# Patient Record
Sex: FEMALE | Race: WHITE | NOT HISPANIC OR LATINO
[De-identification: names, ages, dates, MRNs, and addresses within clinical notes are randomized per-mention and may not be internally consistent; named-entity substitution may affect disease eponyms.]

---

## 2017-02-10 ENCOUNTER — APPOINTMENT (OUTPATIENT)
Dept: ELECTROPHYSIOLOGY | Facility: CLINIC | Age: 82
End: 2017-02-10

## 2017-03-02 ENCOUNTER — APPOINTMENT (OUTPATIENT)
Dept: ELECTROPHYSIOLOGY | Facility: CLINIC | Age: 82
End: 2017-03-02

## 2017-03-02 ENCOUNTER — NON-APPOINTMENT (OUTPATIENT)
Age: 82
End: 2017-03-02

## 2017-03-02 VITALS — OXYGEN SATURATION: 98 % | HEART RATE: 71 BPM | SYSTOLIC BLOOD PRESSURE: 134 MMHG | DIASTOLIC BLOOD PRESSURE: 71 MMHG

## 2017-09-07 ENCOUNTER — APPOINTMENT (OUTPATIENT)
Dept: ELECTROPHYSIOLOGY | Facility: CLINIC | Age: 82
End: 2017-09-07

## 2018-05-29 ENCOUNTER — LABORATORY RESULT (OUTPATIENT)
Age: 83
End: 2018-05-29

## 2018-05-29 ENCOUNTER — MOBILE ON CALL (OUTPATIENT)
Age: 83
End: 2018-05-29

## 2018-05-29 ENCOUNTER — APPOINTMENT (OUTPATIENT)
Dept: DERMATOLOGY | Facility: CLINIC | Age: 83
End: 2018-05-29

## 2018-05-29 ENCOUNTER — APPOINTMENT (OUTPATIENT)
Dept: DERMATOLOGY | Facility: CLINIC | Age: 83
End: 2018-05-29
Payer: MEDICARE

## 2018-05-29 VITALS — DIASTOLIC BLOOD PRESSURE: 60 MMHG | BODY MASS INDEX: 29.7 KG/M2 | SYSTOLIC BLOOD PRESSURE: 90 MMHG | WEIGHT: 184 LBS

## 2018-05-29 DIAGNOSIS — Z86.69 PERSONAL HISTORY OF OTHER DISEASES OF THE NERVOUS SYSTEM AND SENSE ORGANS: ICD-10-CM

## 2018-05-29 DIAGNOSIS — H91.93 UNSPECIFIED HEARING LOSS, BILATERAL: ICD-10-CM

## 2018-05-29 DIAGNOSIS — Z86.79 PERSONAL HISTORY OF OTHER DISEASES OF THE CIRCULATORY SYSTEM: ICD-10-CM

## 2018-05-29 DIAGNOSIS — D49.89 NEOPLASM OF UNSPECIFIED BEHAVIOR OF OTHER SPECIFIED SITES: ICD-10-CM

## 2018-05-29 DIAGNOSIS — Z91.89 OTHER SPECIFIED PERSONAL RISK FACTORS, NOT ELSEWHERE CLASSIFIED: ICD-10-CM

## 2018-05-29 DIAGNOSIS — Z87.39 PERSONAL HISTORY OF OTHER DISEASES OF THE MUSCULOSKELETAL SYSTEM AND CONNECTIVE TISSUE: ICD-10-CM

## 2018-05-29 DIAGNOSIS — H54.7 UNSPECIFIED VISUAL LOSS: ICD-10-CM

## 2018-05-29 PROCEDURE — 11100 BX SKIN SUBCUTANEOUS&/MUCOUS MEMBRANE 1 LESION: CPT

## 2018-05-29 PROCEDURE — 99203 OFFICE O/P NEW LOW 30 MIN: CPT | Mod: 25

## 2018-05-29 PROCEDURE — 11101 BIOPSY SKIN SUBQ&/MUCOUS MEMBRANE EA ADDL LESN: CPT

## 2018-05-29 RX ORDER — OSTOMY SUPPLY 2 3/4"
EACH MISCELLANEOUS
Qty: 3 | Refills: 0 | Status: ACTIVE | COMMUNITY
Start: 2018-05-29 | End: 1900-01-01

## 2018-05-31 ENCOUNTER — TRANSCRIPTION ENCOUNTER (OUTPATIENT)
Age: 83
End: 2018-05-31

## 2018-06-05 PROBLEM — D49.89 NEOPLASM OF ARM: Status: ACTIVE | Noted: 2018-06-05

## 2018-06-22 ENCOUNTER — APPOINTMENT (OUTPATIENT)
Dept: GERIATRICS | Facility: CLINIC | Age: 83
End: 2018-06-22
Payer: MEDICARE

## 2018-06-22 VITALS
OXYGEN SATURATION: 98 % | DIASTOLIC BLOOD PRESSURE: 52 MMHG | WEIGHT: 187.2 LBS | SYSTOLIC BLOOD PRESSURE: 140 MMHG | TEMPERATURE: 98.4 F | BODY MASS INDEX: 30.22 KG/M2 | HEART RATE: 82 BPM

## 2018-06-22 DIAGNOSIS — R55 SYNCOPE AND COLLAPSE: ICD-10-CM

## 2018-06-22 DIAGNOSIS — Z86.39 PERSONAL HISTORY OF OTHER ENDOCRINE, NUTRITIONAL AND METABOLIC DISEASE: ICD-10-CM

## 2018-06-22 PROCEDURE — 99205 OFFICE O/P NEW HI 60 MIN: CPT

## 2018-06-24 ENCOUNTER — EMERGENCY (EMERGENCY)
Facility: HOSPITAL | Age: 83
LOS: 1 days | Discharge: HOME | End: 2018-06-24
Attending: EMERGENCY MEDICINE
Payer: MEDICARE

## 2018-06-24 VITALS
DIASTOLIC BLOOD PRESSURE: 62 MMHG | HEART RATE: 64 BPM | SYSTOLIC BLOOD PRESSURE: 152 MMHG | TEMPERATURE: 98 F | OXYGEN SATURATION: 98 % | RESPIRATION RATE: 16 BRPM

## 2018-06-24 VITALS
TEMPERATURE: 98 F | RESPIRATION RATE: 18 BRPM | DIASTOLIC BLOOD PRESSURE: 71 MMHG | OXYGEN SATURATION: 97 % | HEART RATE: 77 BPM | SYSTOLIC BLOOD PRESSURE: 135 MMHG

## 2018-06-24 PROCEDURE — 99284 EMERGENCY DEPT VISIT MOD MDM: CPT

## 2018-06-24 PROCEDURE — 70450 CT HEAD/BRAIN W/O DYE: CPT

## 2018-06-24 PROCEDURE — 73130 X-RAY EXAM OF HAND: CPT | Mod: 26,RT

## 2018-06-24 PROCEDURE — 72125 CT NECK SPINE W/O DYE: CPT

## 2018-06-24 PROCEDURE — 70450 CT HEAD/BRAIN W/O DYE: CPT | Mod: 26

## 2018-06-24 PROCEDURE — 72125 CT NECK SPINE W/O DYE: CPT | Mod: 26

## 2018-06-24 PROCEDURE — 99284 EMERGENCY DEPT VISIT MOD MDM: CPT | Mod: 25

## 2018-06-24 PROCEDURE — 90471 IMMUNIZATION ADMIN: CPT

## 2018-06-24 PROCEDURE — 90715 TDAP VACCINE 7 YRS/> IM: CPT

## 2018-06-24 PROCEDURE — 73130 X-RAY EXAM OF HAND: CPT

## 2018-06-24 RX ORDER — ACETAMINOPHEN 500 MG
650 TABLET ORAL ONCE
Qty: 0 | Refills: 0 | Status: COMPLETED | OUTPATIENT
Start: 2018-06-24 | End: 2018-06-24

## 2018-06-24 RX ORDER — TETANUS TOXOID, REDUCED DIPHTHERIA TOXOID AND ACELLULAR PERTUSSIS VACCINE, ADSORBED 5; 2.5; 8; 8; 2.5 [IU]/.5ML; [IU]/.5ML; UG/.5ML; UG/.5ML; UG/.5ML
0.5 SUSPENSION INTRAMUSCULAR ONCE
Qty: 0 | Refills: 0 | Status: COMPLETED | OUTPATIENT
Start: 2018-06-24 | End: 2018-06-24

## 2018-06-24 RX ADMIN — TETANUS TOXOID, REDUCED DIPHTHERIA TOXOID AND ACELLULAR PERTUSSIS VACCINE, ADSORBED 0.5 MILLILITER(S): 5; 2.5; 8; 8; 2.5 SUSPENSION INTRAMUSCULAR at 10:45

## 2018-06-24 NOTE — ED ADULT NURSE NOTE - OBJECTIVE STATEMENT
pt 89 yo female via ems for eval s/p fall sometime last night per aide was reported to aide this morning by night aide pt declined going to er last night per protocol EMS called this am pt did not want to come due to dementia hx ems transported pt for eval aide states son is aware pt at baseline mental status oriented x 2 pt with small abrasion right lateral right eye no bleeding skin tear to right hand and left shin no active bleeding pt denies any pain pending md evaluation

## 2018-06-24 NOTE — ED PROVIDER NOTE - MEDICAL DECISION MAKING DETAILS
89F presenting with r hand and r forehead injury s/p fall.  tetanus vaccination, tylenol, R hand xray and CT head/c-spine.

## 2018-06-24 NOTE — ED PROVIDER NOTE - MUSCULOSKELETAL, MLM
Spine appears normal, range of motion is not limited, no muscle or joint tenderness. Able to fully range both hips/knees/ankles.  Fully range right arm/shoulder/elbow/hand/fingers.  No joint tenderness.

## 2018-06-24 NOTE — ED ADULT NURSE REASSESSMENT NOTE - NS ED NURSE REASSESS COMMENT FT1
pt with comfort care given diaper changed with adhesive pad applied to redness on sacral area pt pending radiology results for discharge aide at bedside pt with no complaints

## 2018-06-24 NOTE — ED PROVIDER NOTE - PMH
Afib    Benign Essential Hypertension    CAD (Coronary Artery Disease)    Dyslipidemia    Pacemaker    Syncope

## 2018-06-24 NOTE — ED ADULT NURSE REASSESSMENT NOTE - NS ED NURSE REASSESS COMMENT FT1
pt given teatanus with info sheet to aide at bedside pt declined tylenol states no pain Dr Boykin notidifed right hand skin tear cleaned bacitracin with adaptic dressing and cling applied pt pending radiology results remains alert verbal no complaints

## 2018-06-24 NOTE — ED PROVIDER NOTE - OBJECTIVE STATEMENT
89y/o F with h/o ?dementia, CAD, HLD, PPM, Afib no AC, presenting s/p mechanical fall last night; per aide, patient was transferring to commode last night (unclear what time, before midnight), and fell, hitting dorsum of right hand and left front of head.  No reported LOC.  No n/v.  No change in behavior.  This morning, aide noted bleeding from dorsum of right hand, skin tear, and brought her to ED for evaluation.  Patient has no complaints, does not know how she fell, did not know hand was injured, and denies head injury.  At baseline mental status per aide.  No fever/chills.  No n/v.  No abd pain.  No diarrhea.  No change in urination.  No rash.

## 2018-06-24 NOTE — ED PROVIDER NOTE - ENMT, MLM
Airway patent, Nasal mucosa clear. Mouth with normal mucosa. Throat has no vesicles, no oropharyngeal exudates and uvula is midline.  Nrl TM b/l.  No newman's sign.  No periporbital ecchymoses.

## 2018-06-24 NOTE — ED PROVIDER NOTE - PROGRESS NOTE DETAILS
Spoke with radiology resident; CT head and c spine negative for acute injury; however, reports not crossing over, IT problem, will not be fixed until tomorrow, verbal read only for these studies at present.  Will dc patient. Benigno

## 2018-06-24 NOTE — ED PROVIDER NOTE - PLAN OF CARE
You were seen in the Emergency Department for a head injury and injury to the right hand.  A CT-scan of your head showed no fracture or internal bleeding.  An x-ray of your hand showed no acute fractures. You received a tetanus vaccination.  Your right hand skin tear was cleaned and bandaged; keep the wound clean/dry, change bandage twice daily for at least 1 week.      Return immediately with any worsening pain, severe headache, vomiting, fever, redness around hand, pus draining from hand, or other new/concerning symptoms.

## 2018-06-24 NOTE — ED PROVIDER NOTE - PHYSICAL EXAMINATION
skin:   Forehead: 1cm circular abrasion/ecchymoses over right forehead, no active bleeding, no palpable hematoma or skull defect.  R hand: 2cm skin tear with underlying ecchymoses, no active bleeding.      No cervical/thoracic/lumbar spinal tenderness or step off

## 2018-06-24 NOTE — ED PROVIDER NOTE - CARE PLAN
Principal Discharge DX:	Injury of head, initial encounter  Assessment and plan of treatment:	You were seen in the Emergency Department for a head injury and injury to the right hand.  A CT-scan of your head showed no fracture or internal bleeding.  An x-ray of your hand showed no acute fractures. You received a tetanus vaccination.  Your right hand skin tear was cleaned and bandaged; keep the wound clean/dry, change bandage twice daily for at least 1 week.      Return immediately with any worsening pain, severe headache, vomiting, fever, redness around hand, pus draining from hand, or other new/concerning symptoms.  Secondary Diagnosis:	Skin tear of hand without complication, right, initial encounter

## 2018-06-25 LAB
ALBUMIN SERPL ELPH-MCNC: 4 G/DL
ALP BLD-CCNC: 78 U/L
ALT SERPL-CCNC: 8 U/L
ANION GAP SERPL CALC-SCNC: 13 MMOL/L
AST SERPL-CCNC: 10 U/L
BASOPHILS # BLD AUTO: 0.02 K/UL
BASOPHILS NFR BLD AUTO: 0.2 %
BILIRUB SERPL-MCNC: 0.3 MG/DL
BUN SERPL-MCNC: 41 MG/DL
CALCIUM SERPL-MCNC: 9.2 MG/DL
CHLORIDE SERPL-SCNC: 101 MMOL/L
CHOLEST SERPL-MCNC: 165 MG/DL
CHOLEST/HDLC SERPL: 2.5 RATIO
CO2 SERPL-SCNC: 27 MMOL/L
CREAT SERPL-MCNC: 1.64 MG/DL
EOSINOPHIL # BLD AUTO: 0.12 K/UL
EOSINOPHIL NFR BLD AUTO: 1.2 %
GLUCOSE SERPL-MCNC: 134 MG/DL
HCT VFR BLD CALC: 39.4 %
HDLC SERPL-MCNC: 67 MG/DL
HGB BLD-MCNC: 12.8 G/DL
IMM GRANULOCYTES NFR BLD AUTO: 0.1 %
LDLC SERPL CALC-MCNC: 72 MG/DL
LYMPHOCYTES # BLD AUTO: 2.8 K/UL
LYMPHOCYTES NFR BLD AUTO: 27.9 %
MAN DIFF?: NORMAL
MCHC RBC-ENTMCNC: 30.2 PG
MCHC RBC-ENTMCNC: 32.5 GM/DL
MCV RBC AUTO: 92.9 FL
MONOCYTES # BLD AUTO: 0.75 K/UL
MONOCYTES NFR BLD AUTO: 7.5 %
NEUTROPHILS # BLD AUTO: 6.35 K/UL
NEUTROPHILS NFR BLD AUTO: 63.1 %
PLATELET # BLD AUTO: 267 K/UL
POTASSIUM SERPL-SCNC: 4.7 MMOL/L
PROT SERPL-MCNC: 7.1 G/DL
RBC # BLD: 4.24 M/UL
RBC # FLD: 13.6 %
SODIUM SERPL-SCNC: 141 MMOL/L
TRIGL SERPL-MCNC: 128 MG/DL
TSH SERPL-ACNC: 1.3 UIU/ML
WBC # FLD AUTO: 10.05 K/UL

## 2018-06-26 ENCOUNTER — APPOINTMENT (OUTPATIENT)
Dept: DERMATOLOGY | Facility: CLINIC | Age: 83
End: 2018-06-26
Payer: MEDICARE

## 2018-06-26 PROCEDURE — 17262 DSTRJ MAL LES T/A/L 1.1-2.0: CPT

## 2018-07-06 ENCOUNTER — APPOINTMENT (OUTPATIENT)
Dept: DERMATOLOGY | Facility: CLINIC | Age: 83
End: 2018-07-06
Payer: MEDICARE

## 2018-07-06 VITALS — DIASTOLIC BLOOD PRESSURE: 74 MMHG | SYSTOLIC BLOOD PRESSURE: 130 MMHG

## 2018-07-06 DIAGNOSIS — T14.8XXA OTHER INJURY OF UNSPECIFIED BODY REGION, INITIAL ENCOUNTER: ICD-10-CM

## 2018-07-06 PROCEDURE — 99213 OFFICE O/P EST LOW 20 MIN: CPT | Mod: 24

## 2018-07-06 RX ORDER — NYSTATIN 100000 U/G
100000 OINTMENT TOPICAL
Qty: 15 | Refills: 0 | Status: DISCONTINUED | COMMUNITY
Start: 2018-05-26 | End: 2018-07-06

## 2018-07-25 ENCOUNTER — INPATIENT (INPATIENT)
Facility: HOSPITAL | Age: 83
LOS: 3 days | Discharge: HOME | DRG: 444 | End: 2018-07-29
Attending: INTERNAL MEDICINE | Admitting: HOSPITALIST
Payer: MEDICARE

## 2018-07-25 VITALS
TEMPERATURE: 98 F | HEART RATE: 66 BPM | SYSTOLIC BLOOD PRESSURE: 100 MMHG | DIASTOLIC BLOOD PRESSURE: 60 MMHG | RESPIRATION RATE: 20 BRPM

## 2018-07-25 DIAGNOSIS — K85.10 BILIARY ACUTE PANCREATITIS WITHOUT NECROSIS OR INFECTION: ICD-10-CM

## 2018-07-25 LAB
ALBUMIN SERPL ELPH-MCNC: 3.5 G/DL — SIGNIFICANT CHANGE UP (ref 3.3–5)
ALP SERPL-CCNC: 180 U/L — HIGH (ref 40–120)
ALT FLD-CCNC: 325 U/L — HIGH (ref 10–45)
ANION GAP SERPL CALC-SCNC: 15 MMOL/L — SIGNIFICANT CHANGE UP (ref 5–17)
APPEARANCE UR: ABNORMAL
APTT BLD: 25.7 SEC — LOW (ref 27.5–37.4)
AST SERPL-CCNC: 283 U/L — HIGH (ref 10–40)
BACTERIA # UR AUTO: ABNORMAL /HPF
BASOPHILS # BLD AUTO: 0 K/UL — SIGNIFICANT CHANGE UP (ref 0–0.2)
BASOPHILS NFR BLD AUTO: 0.2 % — SIGNIFICANT CHANGE UP (ref 0–2)
BILIRUB SERPL-MCNC: 3.9 MG/DL — HIGH (ref 0.2–1.2)
BILIRUB UR-MCNC: NEGATIVE — SIGNIFICANT CHANGE UP
BUN SERPL-MCNC: 41 MG/DL — HIGH (ref 7–23)
CALCIUM SERPL-MCNC: 9 MG/DL — SIGNIFICANT CHANGE UP (ref 8.4–10.5)
CHLORIDE SERPL-SCNC: 100 MMOL/L — SIGNIFICANT CHANGE UP (ref 96–108)
CO2 SERPL-SCNC: 22 MMOL/L — SIGNIFICANT CHANGE UP (ref 22–31)
COLOR SPEC: YELLOW — SIGNIFICANT CHANGE UP
CREAT SERPL-MCNC: 1.9 MG/DL — HIGH (ref 0.5–1.3)
DIFF PNL FLD: ABNORMAL
EOSINOPHIL # BLD AUTO: 0 K/UL — SIGNIFICANT CHANGE UP (ref 0–0.5)
EOSINOPHIL NFR BLD AUTO: 0 % — SIGNIFICANT CHANGE UP (ref 0–6)
EPI CELLS # UR: SIGNIFICANT CHANGE UP /HPF
GAS PNL BLDV: SIGNIFICANT CHANGE UP
GLUCOSE SERPL-MCNC: 115 MG/DL — HIGH (ref 70–99)
GLUCOSE UR QL: NEGATIVE — SIGNIFICANT CHANGE UP
HCT VFR BLD CALC: 39.5 % — SIGNIFICANT CHANGE UP (ref 34.5–45)
HGB BLD-MCNC: 12.8 G/DL — SIGNIFICANT CHANGE UP (ref 11.5–15.5)
INR BLD: 1.18 RATIO — HIGH (ref 0.88–1.16)
KETONES UR-MCNC: NEGATIVE — SIGNIFICANT CHANGE UP
LEUKOCYTE ESTERASE UR-ACNC: ABNORMAL
LIDOCAIN IGE QN: 244 U/L — HIGH (ref 7–60)
LYMPHOCYTES # BLD AUTO: 16.7 % — SIGNIFICANT CHANGE UP (ref 13–44)
LYMPHOCYTES # BLD AUTO: 2.1 K/UL — SIGNIFICANT CHANGE UP (ref 1–3.3)
MCHC RBC-ENTMCNC: 29.5 PG — SIGNIFICANT CHANGE UP (ref 27–34)
MCHC RBC-ENTMCNC: 32.3 GM/DL — SIGNIFICANT CHANGE UP (ref 32–36)
MCV RBC AUTO: 91.3 FL — SIGNIFICANT CHANGE UP (ref 80–100)
MONOCYTES # BLD AUTO: 0.8 K/UL — SIGNIFICANT CHANGE UP (ref 0–0.9)
MONOCYTES NFR BLD AUTO: 6.5 % — SIGNIFICANT CHANGE UP (ref 2–14)
NEUTROPHILS # BLD AUTO: 9.5 K/UL — HIGH (ref 1.8–7.4)
NEUTROPHILS NFR BLD AUTO: 76.6 % — SIGNIFICANT CHANGE UP (ref 43–77)
NITRITE UR-MCNC: NEGATIVE — SIGNIFICANT CHANGE UP
PH UR: 6 — SIGNIFICANT CHANGE UP (ref 5–8)
PLATELET # BLD AUTO: 172 K/UL — SIGNIFICANT CHANGE UP (ref 150–400)
POTASSIUM SERPL-MCNC: 4.8 MMOL/L — SIGNIFICANT CHANGE UP (ref 3.5–5.3)
POTASSIUM SERPL-SCNC: 4.8 MMOL/L — SIGNIFICANT CHANGE UP (ref 3.5–5.3)
PROT SERPL-MCNC: 7.1 G/DL — SIGNIFICANT CHANGE UP (ref 6–8.3)
PROT UR-MCNC: 30 MG/DL
PROTHROM AB SERPL-ACNC: 12.8 SEC — HIGH (ref 9.8–12.7)
RBC # BLD: 4.33 M/UL — SIGNIFICANT CHANGE UP (ref 3.8–5.2)
RBC # FLD: 12.7 % — SIGNIFICANT CHANGE UP (ref 10.3–14.5)
RBC CASTS # UR COMP ASSIST: SIGNIFICANT CHANGE UP /HPF (ref 0–2)
SODIUM SERPL-SCNC: 137 MMOL/L — SIGNIFICANT CHANGE UP (ref 135–145)
SP GR SPEC: 1.01 — SIGNIFICANT CHANGE UP (ref 1.01–1.02)
UROBILINOGEN FLD QL: 1 MG/DL
WBC # BLD: 12.4 K/UL — HIGH (ref 3.8–10.5)
WBC # FLD AUTO: 12.4 K/UL — HIGH (ref 3.8–10.5)
WBC UR QL: SIGNIFICANT CHANGE UP /HPF (ref 0–5)

## 2018-07-25 PROCEDURE — 76700 US EXAM ABDOM COMPLETE: CPT | Mod: 26

## 2018-07-25 PROCEDURE — 74176 CT ABD & PELVIS W/O CONTRAST: CPT | Mod: 26

## 2018-07-25 PROCEDURE — 93010 ELECTROCARDIOGRAM REPORT: CPT

## 2018-07-25 PROCEDURE — 71045 X-RAY EXAM CHEST 1 VIEW: CPT | Mod: 26

## 2018-07-25 PROCEDURE — 76705 ECHO EXAM OF ABDOMEN: CPT | Mod: 26

## 2018-07-25 PROCEDURE — 99285 EMERGENCY DEPT VISIT HI MDM: CPT | Mod: 25

## 2018-07-25 RX ORDER — SODIUM CHLORIDE 9 MG/ML
1000 INJECTION INTRAMUSCULAR; INTRAVENOUS; SUBCUTANEOUS ONCE
Qty: 0 | Refills: 0 | Status: COMPLETED | OUTPATIENT
Start: 2018-07-25 | End: 2018-07-25

## 2018-07-25 RX ORDER — CEFTRIAXONE 500 MG/1
1 INJECTION, POWDER, FOR SOLUTION INTRAMUSCULAR; INTRAVENOUS ONCE
Qty: 0 | Refills: 0 | Status: COMPLETED | OUTPATIENT
Start: 2018-07-25 | End: 2018-07-25

## 2018-07-25 RX ORDER — PIPERACILLIN AND TAZOBACTAM 4; .5 G/20ML; G/20ML
3.38 INJECTION, POWDER, LYOPHILIZED, FOR SOLUTION INTRAVENOUS ONCE
Qty: 0 | Refills: 0 | Status: COMPLETED | OUTPATIENT
Start: 2018-07-25 | End: 2018-07-25

## 2018-07-25 RX ADMIN — CEFTRIAXONE 100 GRAM(S): 500 INJECTION, POWDER, FOR SOLUTION INTRAMUSCULAR; INTRAVENOUS at 16:41

## 2018-07-25 RX ADMIN — SODIUM CHLORIDE 1000 MILLILITER(S): 9 INJECTION INTRAMUSCULAR; INTRAVENOUS; SUBCUTANEOUS at 22:15

## 2018-07-25 RX ADMIN — CEFTRIAXONE 1 GRAM(S): 500 INJECTION, POWDER, FOR SOLUTION INTRAMUSCULAR; INTRAVENOUS at 17:10

## 2018-07-25 RX ADMIN — SODIUM CHLORIDE 1000 MILLILITER(S): 9 INJECTION INTRAMUSCULAR; INTRAVENOUS; SUBCUTANEOUS at 21:16

## 2018-07-25 RX ADMIN — PIPERACILLIN AND TAZOBACTAM 3.38 GRAM(S): 4; .5 INJECTION, POWDER, LYOPHILIZED, FOR SOLUTION INTRAVENOUS at 21:50

## 2018-07-25 RX ADMIN — PIPERACILLIN AND TAZOBACTAM 200 GRAM(S): 4; .5 INJECTION, POWDER, LYOPHILIZED, FOR SOLUTION INTRAVENOUS at 21:16

## 2018-07-25 NOTE — CONSULT NOTE ADULT - ASSESSMENT
89yo F with suspected acute cholecystitis, found to also have pancreatitis (elevated lipase). Etiology likely gallstones given concurrent biliary presentation  - Aggressive IV hydration for pancreatitis  - Recommend starting antibiotics for suspected cholangitis and for UTI  - Recheck lipase and LFTs in the AM  - Following resolution of pancreatitis, kaylen not offer elective cholecystectomy to this 89F who is likely a poor surgical candidate  - Should her presentation become more concerning for acute cholecystectomy, would obtain HIDA to confirm and get IR consult for roma laxmi  - D/w Dr. Balbuena    Red  [id number]

## 2018-07-25 NOTE — ED PROVIDER NOTE - MEDICAL DECISION MAKING DETAILS
89 year old female with emesis and confusion. Concern for infection etiology. Will obtain UA, chest x-ray and reassess

## 2018-07-25 NOTE — ED ADULT NURSE NOTE - OBJECTIVE STATEMENT
89 y/o F, A&Ox2 - to person and place (at baseline - hx. of dementia), enters ED by EMS w/ aid for c/o tremors and dark, cloudy urine. As per aid, pt. started have tremors yesterday. Aid also reports "orange-looking" urine associated w/ a foul odor. Aid also reports pt. has not been speaking as much as usual and increased weakness/lethargy. As per son who came later to bedside, pt. had multiple episodes of vomiting yesterday. Abdomen soft, round, nontender. No falls. No fever/chills. Ecchymosis noted to bilateral upper & lower extremities. Persistent redness noted to the sacrum and multiple stage 2 pressure ulcers. 89 y/o F, A&Ox2 - to person and place (at baseline - hx. of dementia), enters ED by EMS w/ aid for c/o tremors and dark, cloudy urine. As per aid, pt. started have tremors yesterday. Aid also reports "orange-looking" urine associated w/ a foul odor. Aid also reports pt. has not been speaking as much as usual and increased weakness/lethargy. As per son who came later to bedside, pt. had multiple episodes of vomiting yesterday. Abdomen soft, round, nontender. No falls. No fever/chills. Ecchymosis noted to bilateral upper & lower extremities. Persistent redness noted to the sacrum and multiple stage 2 pressure ulcers. Side rails up for safety. Yellow band and red socks applied. Family and aid at bedside. 89 y/o F, A&Ox2 - to person and place (at baseline - hx. of dementia), enters ED by EMS w/ aid for c/o tremors and dark, cloudy urine. Hx. of a-fib, pt. has a pacemaker. As per aid, pt. started have tremors yesterday. Aid also reports "orange-looking" urine associated w/ a foul odor. Son reports that pt. was dx. w/ a UTI 3 months and was treated until it resolved about a month ago. Pt. is typically able to ambulate w/ assistance, however, aid reports pt. was too weak to walk due to tremors. Aid also reports pt. has not been speaking as much as usual and increased weakness/lethargy. As per son who came later to bedside, pt. had multiple episodes of vomiting yesterday. Abdomen soft, round, nontender. As per aid, pt. has been having normal BMs. No falls. No fever/chills. Ecchymosis noted to bilateral upper & lower extremities. Persistent redness noted to the sacrum and multiple stage 2 pressure ulcers. Side rails up for safety. Yellow band and red socks applied. Family and aid at bedside.

## 2018-07-25 NOTE — ED PROVIDER NOTE - PROGRESS NOTE DETAILS
patient found to have elevated total bilirubin, LFTs and lipase, concern for gallstone pancreatitis at this time; ct a/p odered. -Marylu Waters PA-C Jame Escamilla MD FACEP   right upper quadrant performed and possible signs of cholecystitis   labs and imaging relayed to patient and family that patient has left adnexal cyst, dilation of the gallbladder, and L4 vertebral fracture all discussed with patient/aide/son, they understood the findings with with capacity and insight.    Sx consulted secondary to findings on ultrasound. Dr. Deluna aware of patient if surgery does not recommend as a surgical candidate.

## 2018-07-25 NOTE — CONSULT NOTE ADULT - SUBJECTIVE AND OBJECTIVE BOX
89yo F with hx afib (not on A/C), HTN, CAD, HLD, dementia, now presents with complaint of nausea/vomiting x 1 day a/w tremors and weakness. Her urine has also had a cloudy appearance and was foul-smelling, as per her aide. Of note, she had a UTI 3 months ago which was treated with antibiotics. She has never had a diagnosis of pancreatitis before and has never been told that she has gallstones/sludge.    PMH  Pacemaker  Syncope  Afib  CAD (Coronary Artery Disease)  Dyslipidemia  Benign Essential Hypertension    PSH  H/O dilation and curettage    Allergies  No Known Allergies    Physical Exam  T(C): 37.1 (18 @ 19:30), Max: 37.1 (18 @ 19:30)  HR: 63 (18 @ 19:30) (63 - 71)  BP: 147/57 (18 @ 19:30) (100/60 - 160/63)  RR: 20 (18 @ 19:30) (18 - 20)  SpO2: 100% (18 @ 19:30) (100% - 100%)  Wt(kg): --  Tmax: T(C): , Max: 37.1 (18 @ 19:30)  Wt(kg): --    Gen: NAD  HEENT: normocephalic, atraumatic, no scleral icterus  Abd: Soft, ND, TTP in RUQ, no rebound, no guarding  Ext: warm, no edema    Labs:                        12.8   12.4  )-----------( 172      ( 2018 16:49 )             39.5         137  |  100  |  41<H>  ----------------------------<  115<H>  4.8   |  22  |  1.90<H>    Ca    9.0      2018 15:27    TPro  7.1  /  Alb  3.5  /  TBili  3.9<H>  /  DBili  x   /  AST  283<H>  /  ALT  325<H>  /  AlkPhos  180<H>      PT/INR - ( 2018 16:49 )   PT: 12.8 sec;   INR: 1.18 ratio         PTT - ( 2018 16:49 )  PTT:25.7 sec  Urinalysis Basic - ( 2018 15:50 )    Color: Yellow / Appearance: SL Turbid / S.010 / pH: x  Gluc: x / Ketone: Negative  / Bili: Negative / Urobili: 1 mg/dL   Blood: x / Protein: 30 mg/dL / Nitrite: Negative   Leuk Esterase: Large / RBC: 0-2 /HPF / WBC 6-10 /HPF   Sq Epi: x / Non Sq Epi: OCC /HPF / Bacteria: Few /HPF      Imaging    < from: CT Abdomen and Pelvis No Cont (18 @ 19:06) >  IMPRESSION:     Acute compression fracture of L4 with at least 40% vertebral body height   loss and associated mild bony retropulsion.    Moderately distended gallbladder with small stones versus sludge. Acute   cholecystitis is not excluded. A HIDA scan can be obtained for complete   evaluation as clinically indicated.    Mild diffuse bladder wall thickening. Correlate clinically to exclude   infection.    A 2.9 cm left adnexal cyst, indeterminant. Pelvic ultrasound can be   obtained for further evaluation as clinically indicated.    Edema and air droplets noted of the visualized right upper extremity   adjacent to the elbow. Clinical correlation suggested.    < end of copied text >    < from: US Abdomen Complete (18 @ 21:54) >  IMPRESSION:     Gallbladder distention and wall thickening with cholelithiasis and   sludge. Negative sonographic Jimenze sign. Findings are equivocal for   acute cholecystitis. Further evaluation with dynamic HIDA scan may be   obtained if clinical suspicion for cholecystitis persists.    < end of copied text >

## 2018-07-25 NOTE — ED PROVIDER NOTE - OBJECTIVE STATEMENT
89 year old female PMHx AFIB, Benign essential HTN, CAD, Dyslipidemia, and Dementia presents to the ED for evaluation with son and home aid. Pt's son reports yesterday morning the pt was feeling very nauseous. Son reports multiple vomiting episodes at that time. After a short period resolved with dietary treatment. Patient is typically able to ambulate weakly with assistance. However Aid notes that this morning she the patient had shaking tremors and was to weak to walk. Aid notes cloudy urine and orange-reddish and foul smelling. 3 months ago the patient was treated for a UTI by a local urologist resolving as of a month ago. Early onset Dementia confusion at presentation is baseline per son and aid. 89 year old female PMHx AFIB, Benign essential HTN, CAD, Dyslipidemia, and Dementia presents to the ED for evaluation with son and home aid. Pt's son reports yesterday morning the pt was feeling very nauseous. Son reports multiple vomiting episodes at that time. After a short period resolved with dietary treatment. Patient is typically able to ambulate weakly with assistance. However Aid notes that this morning the patient had shaking tremors and was too weak to walk. Aid notes cloudy urine orange-reddish in appearance and foul smelling. 3 months ago the patient was treated for a UTI by a local urologist resolving as of a month ago. Early onset Dementia, confusion at presentation is baseline per son and aid. No fever. Incontinence, baseline. Normal bowel movements. No constipation or diarrhea.

## 2018-07-26 DIAGNOSIS — N39.0 URINARY TRACT INFECTION, SITE NOT SPECIFIED: ICD-10-CM

## 2018-07-26 DIAGNOSIS — I48.91 UNSPECIFIED ATRIAL FIBRILLATION: ICD-10-CM

## 2018-07-26 DIAGNOSIS — I10 ESSENTIAL (PRIMARY) HYPERTENSION: ICD-10-CM

## 2018-07-26 DIAGNOSIS — Z29.9 ENCOUNTER FOR PROPHYLACTIC MEASURES, UNSPECIFIED: ICD-10-CM

## 2018-07-26 DIAGNOSIS — N17.9 ACUTE KIDNEY FAILURE, UNSPECIFIED: ICD-10-CM

## 2018-07-26 DIAGNOSIS — K81.9 CHOLECYSTITIS, UNSPECIFIED: ICD-10-CM

## 2018-07-26 DIAGNOSIS — E78.5 HYPERLIPIDEMIA, UNSPECIFIED: ICD-10-CM

## 2018-07-26 DIAGNOSIS — K85.90 ACUTE PANCREATITIS WITHOUT NECROSIS OR INFECTION, UNSPECIFIED: ICD-10-CM

## 2018-07-26 LAB
ALBUMIN SERPL ELPH-MCNC: 3.6 G/DL — SIGNIFICANT CHANGE UP (ref 3.3–5)
ALP SERPL-CCNC: 159 U/L — HIGH (ref 40–120)
ALT FLD-CCNC: 235 U/L — HIGH (ref 10–45)
AMYLASE P1 CFR SERPL: 102 U/L — SIGNIFICANT CHANGE UP (ref 25–125)
ANION GAP SERPL CALC-SCNC: 14 MMOL/L — SIGNIFICANT CHANGE UP (ref 5–17)
AST SERPL-CCNC: 165 U/L — HIGH (ref 10–40)
BILIRUB DIRECT SERPL-MCNC: 2.4 MG/DL — HIGH (ref 0–0.2)
BILIRUB SERPL-MCNC: 2.9 MG/DL — HIGH (ref 0.2–1.2)
BUN SERPL-MCNC: 36 MG/DL — HIGH (ref 7–23)
CALCIUM SERPL-MCNC: 8.1 MG/DL — LOW (ref 8.4–10.5)
CHLORIDE SERPL-SCNC: 107 MMOL/L — SIGNIFICANT CHANGE UP (ref 96–108)
CO2 SERPL-SCNC: 21 MMOL/L — LOW (ref 22–31)
CREAT SERPL-MCNC: 1.7 MG/DL — HIGH (ref 0.5–1.3)
GLUCOSE SERPL-MCNC: 95 MG/DL — SIGNIFICANT CHANGE UP (ref 70–99)
HCT VFR BLD CALC: 34.1 % — LOW (ref 34.5–45)
HGB BLD-MCNC: 11.1 G/DL — LOW (ref 11.5–15.5)
LACTATE SERPL-SCNC: 0.8 MMOL/L — SIGNIFICANT CHANGE UP (ref 0.7–2)
LIDOCAIN IGE QN: 100 U/L — HIGH (ref 7–60)
MCHC RBC-ENTMCNC: 29.4 PG — SIGNIFICANT CHANGE UP (ref 27–34)
MCHC RBC-ENTMCNC: 32.6 GM/DL — SIGNIFICANT CHANGE UP (ref 32–36)
MCV RBC AUTO: 90.5 FL — SIGNIFICANT CHANGE UP (ref 80–100)
PLATELET # BLD AUTO: 198 K/UL — SIGNIFICANT CHANGE UP (ref 150–400)
POTASSIUM SERPL-MCNC: 3.9 MMOL/L — SIGNIFICANT CHANGE UP (ref 3.5–5.3)
POTASSIUM SERPL-SCNC: 3.9 MMOL/L — SIGNIFICANT CHANGE UP (ref 3.5–5.3)
PROT SERPL-MCNC: 6.5 G/DL — SIGNIFICANT CHANGE UP (ref 6–8.3)
RBC # BLD: 3.77 M/UL — LOW (ref 3.8–5.2)
RBC # FLD: 13.7 % — SIGNIFICANT CHANGE UP (ref 10.3–14.5)
SODIUM SERPL-SCNC: 142 MMOL/L — SIGNIFICANT CHANGE UP (ref 135–145)
WBC # BLD: 8.67 K/UL — SIGNIFICANT CHANGE UP (ref 3.8–10.5)
WBC # FLD AUTO: 8.67 K/UL — SIGNIFICANT CHANGE UP (ref 3.8–10.5)

## 2018-07-26 PROCEDURE — 99223 1ST HOSP IP/OBS HIGH 75: CPT | Mod: GC

## 2018-07-26 RX ORDER — ATORVASTATIN CALCIUM 80 MG/1
40 TABLET, FILM COATED ORAL AT BEDTIME
Qty: 0 | Refills: 0 | Status: DISCONTINUED | OUTPATIENT
Start: 2018-07-26 | End: 2018-07-29

## 2018-07-26 RX ORDER — HEPARIN SODIUM 5000 [USP'U]/ML
5000 INJECTION INTRAVENOUS; SUBCUTANEOUS EVERY 8 HOURS
Qty: 0 | Refills: 0 | Status: DISCONTINUED | OUTPATIENT
Start: 2018-07-26 | End: 2018-07-29

## 2018-07-26 RX ORDER — SODIUM CHLORIDE 9 MG/ML
1000 INJECTION INTRAMUSCULAR; INTRAVENOUS; SUBCUTANEOUS
Qty: 0 | Refills: 0 | Status: DISCONTINUED | OUTPATIENT
Start: 2018-07-26 | End: 2018-07-27

## 2018-07-26 RX ORDER — PIPERACILLIN AND TAZOBACTAM 4; .5 G/20ML; G/20ML
3.38 INJECTION, POWDER, LYOPHILIZED, FOR SOLUTION INTRAVENOUS EVERY 12 HOURS
Qty: 0 | Refills: 0 | Status: DISCONTINUED | OUTPATIENT
Start: 2018-07-26 | End: 2018-07-27

## 2018-07-26 RX ORDER — SODIUM CHLORIDE 9 MG/ML
1000 INJECTION INTRAMUSCULAR; INTRAVENOUS; SUBCUTANEOUS
Qty: 0 | Refills: 0 | Status: DISCONTINUED | OUTPATIENT
Start: 2018-07-26 | End: 2018-07-26

## 2018-07-26 RX ADMIN — PIPERACILLIN AND TAZOBACTAM 25 GRAM(S): 4; .5 INJECTION, POWDER, LYOPHILIZED, FOR SOLUTION INTRAVENOUS at 22:11

## 2018-07-26 RX ADMIN — HEPARIN SODIUM 5000 UNIT(S): 5000 INJECTION INTRAVENOUS; SUBCUTANEOUS at 22:11

## 2018-07-26 RX ADMIN — HEPARIN SODIUM 5000 UNIT(S): 5000 INJECTION INTRAVENOUS; SUBCUTANEOUS at 17:31

## 2018-07-26 RX ADMIN — SODIUM CHLORIDE 75 MILLILITER(S): 9 INJECTION INTRAMUSCULAR; INTRAVENOUS; SUBCUTANEOUS at 05:19

## 2018-07-26 RX ADMIN — SODIUM CHLORIDE 75 MILLILITER(S): 9 INJECTION INTRAMUSCULAR; INTRAVENOUS; SUBCUTANEOUS at 22:17

## 2018-07-26 RX ADMIN — HEPARIN SODIUM 5000 UNIT(S): 5000 INJECTION INTRAVENOUS; SUBCUTANEOUS at 05:19

## 2018-07-26 RX ADMIN — ATORVASTATIN CALCIUM 40 MILLIGRAM(S): 80 TABLET, FILM COATED ORAL at 22:11

## 2018-07-26 RX ADMIN — PIPERACILLIN AND TAZOBACTAM 25 GRAM(S): 4; .5 INJECTION, POWDER, LYOPHILIZED, FOR SOLUTION INTRAVENOUS at 10:31

## 2018-07-26 NOTE — PROGRESS NOTE ADULT - ASSESSMENT
89yo F with suspected acute cholecystitis, found to also have pancreatitis (elevated lipase). Etiology likely gallstones given concurrent biliary presentation  - Aggressive IV hydration for pancreatitis  - continue zosyn for suspected cholangitis and for UTI  - Trend LFTs  - Following resolution of pancreatitis, kaylen not offer elective cholecystectomy to this 89F who is likely a poor surgical candidate  - Should her presentation become more concerning for acute cholecystectomy, would obtain HIDA to confirm and get IR consult for perc laxmi  - D/w Dr. Esha Evangelista PGY2  [phone]

## 2018-07-26 NOTE — PROGRESS NOTE ADULT - PROBLEM SELECTOR PLAN 5
-[id number] 4, not on anticoagulation or rate control  -will monitor HR with VS, NSR on EKG -[id number] 4, not on anticoagulation or rate control  -will monitor. HR with VS, NSR on EKG

## 2018-07-26 NOTE — H&P ADULT - PROBLEM SELECTOR PLAN 1
-nausea/vomiting, WBC 12.4 -nausea/vomiting, WBC 12.4, CTAP and US with gallbladder thickening with sludge and stones  -evaluated by surgery, not a candidate for cholecystectomy, will continue with medical management zosyn 3.375g BID, if not managed with abx will obtain HIDA scan and IR eval for perc laxmi   -trend LFTs, will send direct bilirubin with AM labs

## 2018-07-26 NOTE — H&P ADULT - PROBLEM SELECTOR PLAN 2
-n/v, elevated lipase 244, possibly gallstone pancreatitis  -TTE in 2012 with EF of 65%, euvolemic on exam, CXR with clear lungs  -however given age, will continue IVF @75cc/hour, advance diet as tolerated

## 2018-07-26 NOTE — PROGRESS NOTE ADULT - PROBLEM SELECTOR PLAN 1
-nausea/vomiting, WBC 12.4, CTAP and US with gallbladder thickening with sludge and stones  -evaluated by surgery, not a candidate for cholecystectomy, will continue with medical management zosyn 3.375g BID, if not managed with abx will obtain HIDA scan and IR eval for perc laxmi   -trend LFTs, will send direct bilirubin with AM labs -nausea/vomiting, WBC 12.4, CTAP and US with gallbladder thickening with sludge and stones  -evaluated by surgery, not a candidate for cholecystectomy, will continue with medical management zosyn 3.375g BID,   Will consider HIDA scan and IR eval for perc laxmi if condition does not improve- right now afebrile with a neg Jimenez's   -trend LFTs, will send direct bilirubin with AM labs

## 2018-07-26 NOTE — H&P ADULT - PROBLEM SELECTOR PLAN 3
-Cr. 1.90 (baseline ~0.9)  -likely pre-renal in setting of nausea/vomiting, will check urine lytes  -s/p 1L NS bolus, c/w NS@75, monitor Cr, renally dose medications, avoid nephrotoxic medications -cloudy urine per aide, UA with positive leukocyte esterase  -given poor history from pt, unable to endorse dysuria/increased frequency, will treat for UTI - pt. already on zosyn for cholecystitis  -UCx sent

## 2018-07-26 NOTE — H&P ADULT - HISTORY OF PRESENT ILLNESS
History obtained from chart review as pt. is a poor historian and unable to reach pts. son Tay [phone]. 89f hx of dementia (baseline AAOx1), atrial fibrillation (not on AC), PPM in 2012, HTN, HLD, mostly bed/wheelchair bound who presents with nausea and vomiting of 1 day duration. Pt. was BIBEMS accompanied by her 24 hours HHA Darling and Son/HCP Tay. Since morning prior to admission pt. has been nauseous with multiple episodes of vomiting (unclear if bloody/bilious). Aide also noted that her urine was cloudy and foul smelling. Pt. had worsening weakness, at baseline she is mostly bed and wheelchair bound but does ambulate with assistance. Of note, pt was treated for a pan-sensitive E. faecalis UTI in 2014. She is a patient of Dr. Alegria last seen one month ago in June 2018. No fevers, chills, diarrhea per chart.     ED course   VS Tm 98.9, HR 66, /60 -->131/61 (after 1L NS bolus)  Labs - WBC 12.4, Cr 1.9, T. Bili 3.9, , , , Lipase 244, lactate 2.6, UA negative  CTAP and US with distended gallbladder with cholelithiasis/sludge ; CXR clear lungs  Received 1L NS bolus, ceftriaxone 1g, zosyn 3.375g

## 2018-07-26 NOTE — H&P ADULT - ATTENDING COMMENTS
89 yo woman hx of dementia (baseline AAOx1), atrial fibrillation (not on AC), PPM in 2012, HTN, HLD, mostly bed/wheelchair bound who presents with nausea and vomiting of 1 day duration likely 2/2 cholecystitis. Reviewed charting. ROS limited as patient with dementia. Endorses intermittent nausea. No recent episode of emesis.  Vitals reviewed and physically examined patient and agree with resident findings.  Labs, imaging and EKG personally reviewed.  Cholecystitis: -nausea/vomiting, WBC 12.4, CTAP and US with gallbladder thickening with sludge and stones  -per surgery, not a candidate for cholecystectomy, will continue with medical management zosyn 3.375g BID for possible cholangitis (patient without fever, abdominal pain, jaundice on exam), will obtain HIDA scan and to consider IR for possible perc laxmi per surgery recs  -trend LFTs, check direct bilirubin and GGT with repeat labs  Pancreatitis: in setting of elevated lipase, n/v. Patient currently without active epigastric pain. Trial of NPO, Maintenance IV fluid. Lipase per surgery  Remainder of plan as detailed above.  Patient previously unknown to me and I was assigned to precept this case with housestaff resident, Dr. Underwood. Primary day team to assume care in AM.

## 2018-07-26 NOTE — H&P ADULT - PROBLEM SELECTOR PLAN 6
-DVT ppx: HSQ  -DIET: NPO for now, advance diet as tolerated -labile, not on any medications at home  -given age, and acute setting liberal BP control  -monitor VS

## 2018-07-26 NOTE — PROGRESS NOTE ADULT - ATTENDING COMMENTS
I saw and evaluated the patient along with the medical housestaff at the bedside.  Chart reviewed. Case discussed in detail.  Mrs. Seay is a 89 y/0 woman with advanced dementia- does respond slowly to simple questions. Presents with nausea and vomiting with reported cloudy, foul smelling urine. Mild abdominal distention but no significant pain elicited on deep palpation. Labs as documented.  Does not have significant pyuria.  LFTs elevated.  Given labs and abdominal ultrasound findings, likely has some cholestasis. Appreciate surgery input.  Would continue antibiotics to cover common urinary tract and GI pathogens.  Trend LFTs and f/u urine culture.  OOB to chair. Please make Dr. Alegria aware that Ms. Seay is in the hospital.  Advance directives.

## 2018-07-26 NOTE — H&P ADULT - NSHPREVIEWOFSYSTEMS_GEN_ALL_CORE
CONSTITUTIONAL: +weakness, fevers or chills  EYES: No visual changes  ENT: No vertigo or throat pain   NECK: No pain or stiffness  RESPIRATORY: No cough, wheezing, hemoptysis; No shortness of breath  CARDIOVASCULAR: No chest pain or palpitations  GASTROINTESTINAL: +nausea, +vomiting, no hematemesis; No diarrhea or constipation. No melena or hematochezia.  GENITOURINARY: +cloudy urine, No dysuria, frequency or hematuria  NEUROLOGICAL: No numbness or weakness  SKIN: No itching, rashes

## 2018-07-26 NOTE — H&P ADULT - FAMILY HISTORY
Sibling  Still living? Unknown  Family history of Alzheimer's disease, Age at diagnosis: Age Unknown     Child  Still living? Unknown  Family history of schizophrenia, Age at diagnosis: Age Unknown

## 2018-07-26 NOTE — PROGRESS NOTE ADULT - PROBLEM SELECTOR PLAN 3
-cloudy urine per aide, UA with positive leukocyte esterase  -given poor history from pt, unable to endorse dysuria/increased frequency, will treat for UTI - pt. already on zosyn for cholecystitis  -UCx sent -cloudy urine per aide, UA with positive leukocyte esterase  -given poor history from pt, unable to endorse dysuria/increased frequency, will treat for UTI - pt. already on zosyn for cholecystitis  - f/u UCx

## 2018-07-26 NOTE — H&P ADULT - NSHPLABSRESULTS_GEN_ALL_CORE
12.8   12.4  )-----------( 172      ( 25 Jul 2018 16:49 )             39.5     07-25    137  |  100  |  41<H>  ----------------------------<  115<H>  4.8   |  22  |  1.90<H>    Ca    9.0      25 Jul 2018 15:27    TPro  7.1  /  Alb  3.5  /  TBili  3.9<H>  /  DBili  x   /  AST  283<H>  /  ALT  325<H>  /  AlkPhos  180<H>  07-25    EKG - paced rhythm (HR 62)    < from: Xray Chest 1 View- PORTABLE-Urgent (07.25.18 @ 15:54) >    IMPRESSION: Clear lungs.    < end of copied text >    < from: CT Abdomen and Pelvis No Cont (07.25.18 @ 19:06) >    IMPRESSION:     Acute compression fracture of L4 with at least 40% vertebral body height   loss and associated mild bony retropulsion.    Moderately distended gallbladder with small stones versus sludge. Acute   cholecystitis is not excluded. A HIDA scan can be obtained for complete   evaluation as clinically indicated.    Mild diffuse bladder wall thickening. Correlate clinically to exclude   infection.    A 2.9 cm left adnexal cyst, indeterminant. Pelvic ultrasound can be   obtained for further evaluation as clinically indicated.    Edema and air droplets noted of the visualized right upper extremity   adjacent to the elbow. Clinical correlation suggested.    These findings were discussed with Dr. Escamilla at 7/25/2018 7:22 PM   by Dr. Lai Chou with read back confirmation.    < end of copied text >    < from: US Abdomen Complete (07.25.18 @ 21:54) >    IMPRESSION:     Gallbladder distention and wall thickening with cholelithiasis and   sludge. Negative sonographic Jimenez sign. Findings are equivocal for   acute cholecystitis. Further evaluation with dynamic HIDA scan may be   obtained if clinical suspicion for cholecystitis persists.    < end of copied text >    [x] labs, EKG, imaging reviewed by me Personally reviewed labs and noted in detail below              12.8   12.4  )-----------( 172      ( 25 Jul 2018 16:49 )             39.5     07-25    137  |  100  |  41<H>  ----------------------------<  115<H>  4.8   |  22  |  1.90<H>    Ca    9.0      25 Jul 2018 15:27    TPro  7.1  /  Alb  3.5  /  TBili  3.9<H>  /  DBili  x   /  AST  283<H>  /  ALT  325<H>  /  AlkPhos  180<H>  07-25    Personally reviewed EKG - paced rhythm (HR 62)    < from: Xray Chest 1 View- PORTABLE-Urgent (07.25.18 @ 15:54) >    IMPRESSION: Clear lungs.    < end of copied text >    < from: CT Abdomen and Pelvis No Cont (07.25.18 @ 19:06) >    IMPRESSION:     Acute compression fracture of L4 with at least 40% vertebral body height   loss and associated mild bony retropulsion.    Moderately distended gallbladder with small stones versus sludge. Acute   cholecystitis is not excluded. A HIDA scan can be obtained for complete   evaluation as clinically indicated.    Mild diffuse bladder wall thickening. Correlate clinically to exclude   infection.    A 2.9 cm left adnexal cyst, indeterminant. Pelvic ultrasound can be   obtained for further evaluation as clinically indicated.    Edema and air droplets noted of the visualized right upper extremity   adjacent to the elbow. Clinical correlation suggested.    These findings were discussed with Dr. Escamilla at 7/25/2018 7:22 PM   by Dr. Lai Chou with read back confirmation.    < end of copied text >    < from: US Abdomen Complete (07.25.18 @ 21:54) >    IMPRESSION:     Gallbladder distention and wall thickening with cholelithiasis and   sludge. Negative sonographic Jimenez sign. Findings are equivocal for   acute cholecystitis. Further evaluation with dynamic HIDA scan may be   obtained if clinical suspicion for cholecystitis persists.    < end of copied text >

## 2018-07-26 NOTE — ED ADULT NURSE REASSESSMENT NOTE - NS ED NURSE REASSESS COMMENT FT1
Pt cleaned with warm water, dried, new diaper, new blaire, and new linens provided to patient. Cavalon barrier topical film and moisture barrier cream applied to patient's sacrum. Pressure ulcer dressing applied to sacrum to prevent pressure ulcer. Moisture associated dermatitis visualized to patient's sacrum. Pt turned and positioned with pillow to prevent pressure to sacral area every two hours.

## 2018-07-26 NOTE — H&P ADULT - NSHPPHYSICALEXAM_GEN_ALL_CORE
General: WN/WD NAD  Neurology: A&Ox1, nonfocal, KINGSLEY x 4  Eyes: PERRLA/ EOMI, Gross vision intact  ENT/Neck: Neck supple, trachea midline, No JVD, Gross hearing intact  Respiratory: CTA B/L, No wheezing, rales, rhonchi  CV: RRR, S1S2, no murmurs, rubs or gallops  Abdominal: Soft, NT, ND +BS, negative for Jimenez's sign  Extremities: No edema, + peripheral pulses  Skin: No Rashes, Hematoma, Ecchymosis General: WN/WD NAD  Neurology: A&Ox2, nonfocal, KINGSLEY x 4  Eyes: PERRLA/ EOMI, Gross vision intact  ENT/Neck: Neck supple, trachea midline, No JVD, Gross hearing intact  Respiratory: CTA B/L, No wheezing, rales, rhonchi  CV: RRR, S1S2, no murmurs, rubs or gallops  Abdominal: Soft, NT, ND +BS, negative for Jimenez's sign  Extremities: No edema, + peripheral pulses  Skin: No Rashes, Hematoma, Ecchymosis VS Tm 98.9, HR 66, /60 -->131/61 (after 1L NS bolus)    General: WN/WD NAD  Neurology: A&Ox2, nonfocal, KINGSLEY x 4  Eyes: PERRLA/ EOMI, Gross vision intact  ENT/Neck: Neck supple, trachea midline, No JVD, Gross hearing intact  Respiratory: CTA B/L, No wheezing, rales, rhonchi  CV: RRR, S1S2, no murmurs, rubs or gallops  Abdominal: Soft, NT, ND +BS, negative for Jimenez's sign  Extremities: No edema, + peripheral pulses  Skin: No Rashes, Hematoma, Ecchymosis

## 2018-07-26 NOTE — H&P ADULT - PROBLEM SELECTOR PLAN 4
-c/w atorvastatin 40mg daily -Cr. 1.90 (baseline ~0.9)  -likely pre-renal in setting of nausea/vomiting, will check urine lytes  -s/p 1L NS bolus, c/w NS@75, monitor Cr, renally dose medications, avoid nephrotoxic medications

## 2018-07-26 NOTE — PROGRESS NOTE ADULT - SUBJECTIVE AND OBJECTIVE BOX
Josh Garcia MD ROCHELLE  Internal Medicine PGY-1  Pager [facility]: [phone]; [facility] [id number]    Patient is a 89y old  Female who presents with a chief complaint of nausea and vomiting (2018 03:40)      SUBJECTIVE/OVERNIGHT EVENTS   No acute events overnight. Patient tolerating meals, without n/v. Reports having daily BM. Denies fevers, chills, SOB. ROS otherwise negative.     OBJECTIVE  Vital Signs Last 24 Hrs  T(C): 36.5 (2018 09:12), Max: 37.3 (2018 03:00)  T(F): 97.7 (2018 09:12), Max: 99.2 (2018 03:00)  HR: 60 (2018 09:12) (60 - 75)  BP: 127/68 (2018 09:12) (100/60 - 160/63)  BP(mean): --  RR: 18 (2018 09:12) (18 - 20)  SpO2: 97% (2018 09:12) (95% - 100%)    I&O's Summary    2018 07:01  -  2018 07:00  --------------------------------------------------------  IN: 150 mL / OUT: 0 mL / NET: 150 mL    2018 07:  -  2018 10:47  --------------------------------------------------------  IN: 225 mL / OUT: 0 mL / NET: 225 mL        PHYSICAL EXAM:  GENERAL: NAD, well-developed, A&O X1/2  HEAD:  Atraumatic, Normocephalic  EYES: EOMI, conjunctiva and sclera clear  NECK: Supple, No JVD  CHEST/LUNG: Clear to auscultation bilaterally; No wheeze  HEART: Regular rate and rhythm; No murmurs, rubs, or gallops  ABDOMEN: Soft, Nontender, Nondistended; Bowel sounds present  EXTREMITIES:  2+ Peripheral Pulses, No clubbing, cyanosis, or edema  PSYCH: AAOx3  NEUROLOGY: non-focal  SKIN: No rashes or lesions    LABS                        11.1   8.67  )-----------( 198      ( 2018 08:11 )             34.1                         12.8   12.4  )-----------( 172      ( 2018 16:49 )             39.5         142  |  107  |  36<H>  ----------------------------<  95  3.9   |  21<L>  |  1.70<H>      137  |  100  |  41<H>  ----------------------------<  115<H>  4.8   |  22  |  1.90<H>    Ca    8.1<L>      2018 05:11  Ca    9.0      2018 15:27    TPro  6.5  /  Alb  3.6  /  TBili  2.9<H>  /  DBili  2.4<H>  /  AST  165<H>  /  ALT  235<H>  /  AlkPhos  159<H>    TPro  7.1  /  Alb  3.5  /  TBili  3.9<H>  /  DBili  x   /  AST  283<H>  /  ALT  325<H>  /  AlkPhos  180<H>      CAPILLARY BLOOD GLUCOSE        PT/INR - ( 2018 16:49 )   PT: 12.8 sec;   INR: 1.18 ratio         PTT - ( 2018 16:49 )  PTT:25.7 sec          18 @ 15:27 - VBG - pH: 7.34  | pCO2: 48    | pO2: 44    | Lactate: 2.6        Urinalysis Basic - ( 2018 15:50 )    Color: Yellow / Appearance: SL Turbid / S.010 / pH: x  Gluc: x / Ketone: Negative  / Bili: Negative / Urobili: 1 mg/dL   Blood: x / Protein: 30 mg/dL / Nitrite: Negative   Leuk Esterase: Large / RBC: 0-2 /HPF / WBC 6-10 /HPF   Sq Epi: x / Non Sq Epi: OCC /HPF / Bacteria: Few /HPF        RADIOLOGY & ADDITIONAL TESTS:  < from: US Abdomen Complete (18 @ 21:54) >  IMPRESSION:     Gallbladder distention and wall thickening with cholelithiasis and   sludge. Negative sonographic Jimenez sign. Findings are equivocal for   acute cholecystitis. Further evaluation with dynamic HIDA scan may be   obtained if clinical suspicion for cholecystitis persists.      < end of copied text >      MEDICATIONS  (STANDING):  atorvastatin 40 milliGRAM(s) Oral at bedtime  heparin  Injectable 5000 Unit(s) SubCutaneous every 8 hours  piperacillin/tazobactam IVPB. 3.375 Gram(s) IV Intermittent every 12 hours  sodium chloride 0.9%. 1000 milliLiter(s) (75 mL/Hr) IV Continuous <Continuous>    MEDICATIONS  (PRN): Josh Garcia MD ROCHLELE  Internal Medicine PGY-1  Pager [facility]: [phone]; [facility] [id number]    Patient is a 89y old  Female who presents with a chief complaint of nausea and vomiting (2018 03:40)    HPI:   History obtained from chart review as pt. is a poor historian and unable to reach pts. son Tay [phone].    14F hx of dementia (baseline AAOx1), atrial fibrillation (not on AC), PPM in , HTN, HLD, mostly bed/wheelchair bound who presents with nausea and vomiting of 1 day duration. Pt. was BIBEMS accompanied by her 24 hours HHA Darling and Son/HCP Tay. Since morning prior to admission pt. has been nauseous with multiple episodes of vomiting (unclear if bloody/bilious). Aide also noted that her urine was cloudy and foul smelling. Pt. had worsening weakness, at baseline she is mostly bed and wheelchair bound but does ambulate with assistance. Of note, pt was treated for a pan-sensitive E. faecalis UTI in . She is a patient of Dr. Alegria last seen one month ago in 2018. Nods her head when asked if she is nauseous. No fevers, chills, diarrhea per chart.     ED course   VS Tm 98.9, HR 66, /60 fluid responsive >131/61 (after 1L NS bolus)  Labs - WBC 12.4, Cr 1.9, T. Bili 3.9, , , , Lipase 244, lactate 2.6, UA negative  CTAP and US with distended gallbladder with cholelithiasis/sludge ; CXR clear lungs  Received 1L NS bolus, ceftriaxone 1g, zosyn 3.375g    SUBJECTIVE/OVERNIGHT EVENTS   No acute events overnight. Patient tolerating meals, without n/v. Reports having daily BM. Denies fevers, chills, SOB. ROS otherwise negative.     OBJECTIVE  Vital Signs Last 24 Hrs  T(C): 36.5 (2018 09:12), Max: 37.3 (2018 03:00)  T(F): 97.7 (2018 09:12), Max: 99.2 (2018 03:00)  HR: 60 (2018 09:12) (60 - 75)  BP: 127/68 (2018 09:12) (100/60 - 160/63)  BP(mean): --  RR: 18 (2018 09:12) (18 - 20)  SpO2: 97% (2018 09:12) (95% - 100%)    I&O's Summary    2018 07:  -  2018 07:00  --------------------------------------------------------  IN: 150 mL / OUT: 0 mL / NET: 150 mL    2018 07:  -  2018 10:47  --------------------------------------------------------  IN: 225 mL / OUT: 0 mL / NET: 225 mL    PHYSICAL EXAM:  GENERAL: NAD, well-developed, A&O X1/2  HEAD:  Atraumatic, Normocephalic  EYES: EOMI, conjunctiva and sclera clear  NECK: Supple, No JVD  CHEST/LUNG: Clear to auscultation bilaterally; No wheeze  HEART: Regular rate and rhythm; holosystolic murmur blowing murmur best auscultate at the left sternal border, S1/S2, rubs, or gallops  ABDOMEN: Soft, Nontender, Nondistended; Bowel sounds present  EXTREMITIES:  2+ Peripheral Pulses, No clubbing, cyanosis, or edema  PSYCH: AAOx3  NEUROLOGY: non-focal  SKIN: No rashes or lesions    LABS                        11.1   8.67  )-----------( 198      ( 2018 08:11 )             34.1                         12.8   12.4  )-----------( 172      ( 2018 16:49 )             39.5     07-26    142  |  107  |  36<H>  ----------------------------<  95  3.9   |  21<L>  |  1.70<H>      137  |  100  |  41<H>  ----------------------------<  115<H>  4.8   |  22  |  1.90<H>    Ca    8.1<L>      2018 05:11  Ca    9.0      2018 15:27    TPro  6.5  /  Alb  3.6  /  TBili  2.9<H>  /  DBili  2.4<H>  /  AST  165<H>  /  ALT  235<H>  /  AlkPhos  159<H>    TPro  7.1  /  Alb  3.5  /  TBili  3.9<H>  /  DBili  x   /  AST  283<H>  /  ALT  325<H>  /  AlkPhos  180<H>      PT/INR - ( 2018 16:49 )   PT: 12.8 sec;   INR: 1.18 ratio         PTT - ( 2018 16:49 )  PTT:25.7 sec    18 @ 15:27 - VBG - pH: 7.34  | pCO2: 48    | pO2: 44    | Lactate: 2.6      Urinalysis Basic - ( 2018 15:50 )    Color: Yellow / Appearance: SL Turbid / S.010 / pH: x  Gluc: x / Ketone: Negative  / Bili: Negative / Urobili: 1 mg/dL   Blood: x / Protein: 30 mg/dL / Nitrite: Negative   Leuk Esterase: Large / RBC: 0-2 /HPF / WBC 6-10 /HPF   Sq Epi: x / Non Sq Epi: OCC /HPF / Bacteria: Few /HPF    RADIOLOGY & ADDITIONAL TESTS:  < from: US Abdomen Complete (18 @ 21:54) >  IMPRESSION:     Gallbladder distention and wall thickening with cholelithiasis and   sludge. Negative sonographic Jimenez sign. Findings are equivocal for   acute cholecystitis. Further evaluation with dynamic HIDA scan may be   obtained if clinical suspicion for cholecystitis persists.  < end of copied text >    MEDICATIONS  (STANDING):  atorvastatin 40 milliGRAM(s) Oral at bedtime  heparin  Injectable 5000 Unit(s) SubCutaneous every 8 hours  piperacillin/tazobactam IVPB. 3.375 Gram(s) IV Intermittent every 12 hours  sodium chloride 0.9%. 1000 milliLiter(s) (75 mL/Hr) IV Continuous <Continuous>    MEDICATIONS  (PRN):

## 2018-07-26 NOTE — H&P ADULT - PROBLEM SELECTOR PLAN 5
-DVT ppx: HSQ  -DIET: NPO for now, advance diet as tolerated -c/w atorvastatin 40mg daily -[id number], not on anticoagulation or rate control  -will monitor HR with VS, NSR on EKG

## 2018-07-26 NOTE — H&P ADULT - ASSESSMENT
89F hx of dementia (baseline AAOx1), atrial fibrillation (not on AC), PPM in 2012, HTN, HLD, mostly bed/wheelchair bound who presents with nausea and vomiting of 1 day duration likely 2/2 cholecystitis. 89 F hx of dementia (baseline AAOx1), atrial fibrillation (not on AC), PPM in 2012, HTN, HLD, mostly bed/wheelchair bound who presents with nausea and vomiting of 1 day duration likely 2/2 cholecystitis.

## 2018-07-26 NOTE — PROGRESS NOTE ADULT - PROBLEM SELECTOR PLAN 2
-n/v, elevated lipase 244, possibly gallstone pancreatitis  -TTE in 2012 with EF of 65%, euvolemic on exam, CXR with clear lungs  -however given age, will continue IVF @75cc/hour, advance diet as tolerated -n/v, elevated lipase 244, possibly gallstone pancreatitis--> downtrended to 100 still without abdominal pain  -TTE in 2012 with EF of 65%, euvolemic on exam, CXR with clear lungs  -however given age, will continue IVF @75cc/hour, advance diet as tolerated

## 2018-07-26 NOTE — PROGRESS NOTE ADULT - PROBLEM SELECTOR PLAN 4
-Cr. 1.90 (baseline ~0.9)  -likely pre-renal in setting of nausea/vomiting, will check urine lytes  -s/p 1L NS bolus, c/w NS@75, monitor Cr, renally dose medications, avoid nephrotoxic medications

## 2018-07-26 NOTE — PROGRESS NOTE ADULT - ASSESSMENT
89 F hx of dementia (baseline AAOx1), atrial fibrillation (not on AC), PPM in 2012, HTN, HLD, mostly bed/wheelchair bound who presents with nausea and vomiting of 1 day duration likely 2/2 cholecystitis. 89 F hx of dementia (baseline AAOx1), atrial fibrillation (not on AC), PPM in 2012, HTN, HLD, mostly bed/wheelchair bound who presents with nausea and vomiting of 1 day duration, presentation presumably 2/2 cholecystitis vs UTI.

## 2018-07-26 NOTE — PROGRESS NOTE ADULT - SUBJECTIVE AND OBJECTIVE BOX
General Surgery Progress Note    S: Patient seen and examined.  No events overnight.  No complaints.      Physical Exam  T(C): 36.8 (18 @ 06:30), Max: 37.3 (18 @ 03:00)  HR: 69 (18 @ 06:30) (63 - 75)  BP: 147/74 (18 @ 06:30) (100/60 - 160/63)  RR: 18 (18 @ 06:30) (18 - 20)  SpO2: 96% (18 @ 06:30) (95% - 100%)  Wt(kg): --     @ 07:01  -   @ 07:00  --------------------------------------------------------  IN:    sodium chloride 0.9%.: 150 mL  Total IN: 150 mL    OUT:  Total OUT: 0 mL    Total NET: 150 mL        Gen: NAD  HEENT: normocephalic, atraumatic, no scleral icterus  Abd: Soft, ND, NT  Ext: warm, no edema    Labs:                        12.8   12.4  )-----------( 172      ( 2018 16:49 )             39.5         142  |  107  |  36<H>  ----------------------------<  95  3.9   |  21<L>  |  1.70<H>    Ca    8.1<L>      2018 05:11    TPro  6.5  /  Alb  3.6  /  TBili  2.9<H>  /  DBili  2.4<H>  /  AST  165<H>  /  ALT  235<H>  /  AlkPhos  159<H>      LIVER FUNCTIONS - ( 2018 05:11 )  Alb: 3.6 g/dL / Pro: 6.5 g/dL / ALK PHOS: 159 U/L / ALT: 235 U/L / AST: 165 U/L / GGT: x           PT/INR - ( 2018 16:49 )   PT: 12.8 sec;   INR: 1.18 ratio         PTT - ( 2018 16:49 )  PTT:25.7 sec  Urinalysis Basic - ( 2018 15:50 )    Color: Yellow / Appearance: SL Turbid / S.010 / pH: x  Gluc: x / Ketone: Negative  / Bili: Negative / Urobili: 1 mg/dL   Blood: x / Protein: 30 mg/dL / Nitrite: Negative   Leuk Esterase: Large / RBC: 0-2 /HPF / WBC 6-10 /HPF   Sq Epi: x / Non Sq Epi: OCC /HPF / Bacteria: Few /HPF                 Imaging    < from: CT Abdomen and Pelvis No Cont (18 @ 19:06) >  IMPRESSION:     Acute compression fracture of L4 with at least 40% vertebral body height   loss and associated mild bony retropulsion.    Moderately distended gallbladder with small stones versus sludge. Acute   cholecystitis is not excluded. A HIDA scan can be obtained for complete   evaluation as clinically indicated.    Mild diffuse bladder wall thickening. Correlate clinically to exclude   infection.    A 2.9 cm left adnexal cyst, indeterminant. Pelvic ultrasound can be   obtained for further evaluation as clinically indicated.    Edema and air droplets noted of the visualized right upper extremity   adjacent to the elbow. Clinical correlation suggested.    < end of copied text >    < from: US Abdomen Complete (18 @ 21:54) >  IMPRESSION:     Gallbladder distention and wall thickening with cholelithiasis and   sludge. Negative sonographic Jimenez sign. Findings are equivocal for   acute cholecystitis. Further evaluation with dynamic HIDA scan may be   obtained if clinical suspicion for cholecystitis persists.    < end of copied text >

## 2018-07-26 NOTE — PROGRESS NOTE ADULT - PROBLEM SELECTOR PLAN 6
-labile, not on any medications at home  -given age, and acute setting liberal BP control  -monitor VS

## 2018-07-27 ENCOUNTER — TRANSCRIPTION ENCOUNTER (OUTPATIENT)
Age: 83
End: 2018-07-27

## 2018-07-27 LAB
-  AMIKACIN: SIGNIFICANT CHANGE UP
-  AMOXICILLIN/CLAVULANIC ACID: SIGNIFICANT CHANGE UP
-  AMPICILLIN/SULBACTAM: SIGNIFICANT CHANGE UP
-  AMPICILLIN: SIGNIFICANT CHANGE UP
-  AZTREONAM: SIGNIFICANT CHANGE UP
-  CEFAZOLIN: SIGNIFICANT CHANGE UP
-  CEFEPIME: SIGNIFICANT CHANGE UP
-  CEFOXITIN: SIGNIFICANT CHANGE UP
-  CEFTRIAXONE: SIGNIFICANT CHANGE UP
-  CIPROFLOXACIN: SIGNIFICANT CHANGE UP
-  ERTAPENEM: SIGNIFICANT CHANGE UP
-  GENTAMICIN: SIGNIFICANT CHANGE UP
-  IMIPENEM: SIGNIFICANT CHANGE UP
-  LEVOFLOXACIN: SIGNIFICANT CHANGE UP
-  MEROPENEM: SIGNIFICANT CHANGE UP
-  NITROFURANTOIN: SIGNIFICANT CHANGE UP
-  PIPERACILLIN/TAZOBACTAM: SIGNIFICANT CHANGE UP
-  TIGECYCLINE: SIGNIFICANT CHANGE UP
-  TOBRAMYCIN: SIGNIFICANT CHANGE UP
-  TRIMETHOPRIM/SULFAMETHOXAZOLE: SIGNIFICANT CHANGE UP
ALBUMIN SERPL ELPH-MCNC: 3.4 G/DL — SIGNIFICANT CHANGE UP (ref 3.3–5)
ALP SERPL-CCNC: 175 U/L — HIGH (ref 40–120)
ALT FLD-CCNC: 177 U/L — HIGH (ref 10–45)
ANION GAP SERPL CALC-SCNC: 12 MMOL/L — SIGNIFICANT CHANGE UP (ref 5–17)
AST SERPL-CCNC: 83 U/L — HIGH (ref 10–40)
BASOPHILS # BLD AUTO: 0.1 K/UL — SIGNIFICANT CHANGE UP (ref 0–0.2)
BASOPHILS NFR BLD AUTO: 0.7 % — SIGNIFICANT CHANGE UP (ref 0–2)
BILIRUB SERPL-MCNC: 1.8 MG/DL — HIGH (ref 0.2–1.2)
BUN SERPL-MCNC: 27 MG/DL — HIGH (ref 7–23)
CALCIUM SERPL-MCNC: 8.4 MG/DL — SIGNIFICANT CHANGE UP (ref 8.4–10.5)
CHLORIDE SERPL-SCNC: 107 MMOL/L — SIGNIFICANT CHANGE UP (ref 96–108)
CO2 SERPL-SCNC: 19 MMOL/L — LOW (ref 22–31)
CREAT SERPL-MCNC: 1.42 MG/DL — HIGH (ref 0.5–1.3)
CULTURE RESULTS: SIGNIFICANT CHANGE UP
EOSINOPHIL # BLD AUTO: 0.2 K/UL — SIGNIFICANT CHANGE UP (ref 0–0.5)
EOSINOPHIL NFR BLD AUTO: 2.5 % — SIGNIFICANT CHANGE UP (ref 0–6)
GLUCOSE SERPL-MCNC: 63 MG/DL — LOW (ref 70–99)
HCT VFR BLD CALC: 38.9 % — SIGNIFICANT CHANGE UP (ref 34.5–45)
HGB BLD-MCNC: 12.4 G/DL — SIGNIFICANT CHANGE UP (ref 11.5–15.5)
LYMPHOCYTES # BLD AUTO: 2.6 K/UL — SIGNIFICANT CHANGE UP (ref 1–3.3)
LYMPHOCYTES # BLD AUTO: 29.2 % — SIGNIFICANT CHANGE UP (ref 13–44)
MAGNESIUM SERPL-MCNC: 2 MG/DL — SIGNIFICANT CHANGE UP (ref 1.6–2.6)
MCHC RBC-ENTMCNC: 29 PG — SIGNIFICANT CHANGE UP (ref 27–34)
MCHC RBC-ENTMCNC: 31.8 GM/DL — LOW (ref 32–36)
MCV RBC AUTO: 91.3 FL — SIGNIFICANT CHANGE UP (ref 80–100)
METHOD TYPE: SIGNIFICANT CHANGE UP
MONOCYTES # BLD AUTO: 0.6 K/UL — SIGNIFICANT CHANGE UP (ref 0–0.9)
MONOCYTES NFR BLD AUTO: 6.6 % — SIGNIFICANT CHANGE UP (ref 2–14)
NEUTROPHILS # BLD AUTO: 5.4 K/UL — SIGNIFICANT CHANGE UP (ref 1.8–7.4)
NEUTROPHILS NFR BLD AUTO: 60.9 % — SIGNIFICANT CHANGE UP (ref 43–77)
ORGANISM # SPEC MICROSCOPIC CNT: SIGNIFICANT CHANGE UP
ORGANISM # SPEC MICROSCOPIC CNT: SIGNIFICANT CHANGE UP
PHOSPHATE SERPL-MCNC: 2.7 MG/DL — SIGNIFICANT CHANGE UP (ref 2.5–4.5)
PLATELET # BLD AUTO: 188 K/UL — SIGNIFICANT CHANGE UP (ref 150–400)
POTASSIUM SERPL-MCNC: 4.3 MMOL/L — SIGNIFICANT CHANGE UP (ref 3.5–5.3)
POTASSIUM SERPL-SCNC: 4.3 MMOL/L — SIGNIFICANT CHANGE UP (ref 3.5–5.3)
PROT SERPL-MCNC: 7 G/DL — SIGNIFICANT CHANGE UP (ref 6–8.3)
RBC # BLD: 4.26 M/UL — SIGNIFICANT CHANGE UP (ref 3.8–5.2)
RBC # FLD: 12.1 % — SIGNIFICANT CHANGE UP (ref 10.3–14.5)
SODIUM SERPL-SCNC: 138 MMOL/L — SIGNIFICANT CHANGE UP (ref 135–145)
SPECIMEN SOURCE: SIGNIFICANT CHANGE UP
WBC # BLD: 8.8 K/UL — SIGNIFICANT CHANGE UP (ref 3.8–10.5)
WBC # FLD AUTO: 8.8 K/UL — SIGNIFICANT CHANGE UP (ref 3.8–10.5)

## 2018-07-27 PROCEDURE — 99232 SBSQ HOSP IP/OBS MODERATE 35: CPT | Mod: GC

## 2018-07-27 RX ORDER — METRONIDAZOLE 500 MG
500 TABLET ORAL EVERY 8 HOURS
Qty: 0 | Refills: 0 | Status: DISCONTINUED | OUTPATIENT
Start: 2018-07-27 | End: 2018-07-29

## 2018-07-27 RX ORDER — CEPHALEXIN 500 MG
500 CAPSULE ORAL EVERY 12 HOURS
Qty: 0 | Refills: 0 | Status: DISCONTINUED | OUTPATIENT
Start: 2018-07-27 | End: 2018-07-29

## 2018-07-27 RX ORDER — METRONIDAZOLE 500 MG
1 TABLET ORAL
Qty: 9 | Refills: 0 | OUTPATIENT
Start: 2018-07-27 | End: 2018-07-29

## 2018-07-27 RX ADMIN — HEPARIN SODIUM 5000 UNIT(S): 5000 INJECTION INTRAVENOUS; SUBCUTANEOUS at 07:43

## 2018-07-27 RX ADMIN — Medication 500 MILLIGRAM(S): at 21:02

## 2018-07-27 RX ADMIN — PIPERACILLIN AND TAZOBACTAM 25 GRAM(S): 4; .5 INJECTION, POWDER, LYOPHILIZED, FOR SOLUTION INTRAVENOUS at 10:17

## 2018-07-27 RX ADMIN — HEPARIN SODIUM 5000 UNIT(S): 5000 INJECTION INTRAVENOUS; SUBCUTANEOUS at 14:34

## 2018-07-27 RX ADMIN — HEPARIN SODIUM 5000 UNIT(S): 5000 INJECTION INTRAVENOUS; SUBCUTANEOUS at 21:02

## 2018-07-27 RX ADMIN — ATORVASTATIN CALCIUM 40 MILLIGRAM(S): 80 TABLET, FILM COATED ORAL at 21:02

## 2018-07-27 NOTE — DISCHARGE NOTE ADULT - MEDICATION SUMMARY - MEDICATIONS TO TAKE
I will START or STAY ON the medications listed below when I get home from the hospital:    metroNIDAZOLE 500 mg oral tablet  -- 1 tab(s) by mouth every 8 hours  -- Indication: For Cholecystitis    Lipitor 40 mg oral tablet  -- 1 tab(s) by mouth once a day  -- Indication: For CAD    cephalexin 500 mg oral capsule  -- 1 cap(s) by mouth every 12 hours  -- Indication: For Acute cystitis without hematuria I will START or STAY ON the medications listed below when I get home from the hospital:    metroNIDAZOLE 500 mg oral tablet  -- 1 tab(s) by mouth every 8 hours  -- Indication: For Cholecystitis    Lipitor 40 mg oral tablet  -- 1 tab(s) by mouth once a day  -- Indication: For CAD    amLODIPine 5 mg oral tablet  -- 1 tab(s) by mouth once a day  -- Indication: For Hypertension    cephalexin 500 mg oral capsule  -- 1 cap(s) by mouth every 12 hours  -- Indication: For Acute cystitis without hematuria

## 2018-07-27 NOTE — PROGRESS NOTE ADULT - ASSESSMENT
89F with cholecystitis and pancreatitis    - NPO/IVF  - LFTs downtrending  - C/w Abx  - Pain management

## 2018-07-27 NOTE — PROGRESS NOTE ADULT - ATTENDING COMMENTS
I saw and evaluated the patient along with the medical housestaff at the bedside.  HER reviewed.  Case discussed in detail.  I agree with the findings and plan documented by Dr. Garcia.  Ms. Seay is much more responsive today.  Answers simple questions appropriately. Afebrile and without complaints.  Creatinine and LFTs downtrending.  Responding well to IV fluids and antibiotics.  Likely UTI and possible cholecystitis/mild cholangitis.  Appreciate surgery input.  D/C planning.  Follow up urine culture results. Complete course of antibiotics.  Outpatient follow up.

## 2018-07-27 NOTE — PROGRESS NOTE ADULT - PROBLEM SELECTOR PLAN 1
-nausea/vomiting, WBC 12.4, CTAP and US with gallbladder thickening with sludge and stones  -evaluated by surgery, not a candidate for cholecystectomy, will continue with medical management zosyn 3.375g BID,   Will consider HIDA scan and IR eval for perc laxmi if condition does not improve- right now afebrile with a neg Jimenez's   -trend LFTs

## 2018-07-27 NOTE — PROGRESS NOTE ADULT - SUBJECTIVE AND OBJECTIVE BOX
Josh Garcia MD ROCHELLE  Internal Medicine PGY-1  Pager [facility]: [phone]; [facility] [id number]    Patient is a 89y old  Female who presents with a chief complaint of nausea and vomiting (2018 03:40)      SUBJECTIVE/OVERNIGHT EVENTS   No acute events overnight. Patient much more verbal and responsive; no longer has nausea, and denying abdominal pain, no vomiting. Reports having a BM yesterday. Denies fevers, chills, SOB. ROS otherwise negative.     OBJECTIVE  Vital Signs Last 24 Hrs  T(C): 36.4 (2018 04:46), Max: 36.6 (2018 13:25)  T(F): 97.6 (2018 04:46), Max: 97.8 (2018 13:25)  HR: 65 (2018 04:46) (60 - 65)  BP: 160/85 (2018 04:46) (127/68 - 176/74)  BP(mean): --  RR: 18 (2018 04:46) (18 - 18)  SpO2: 95% (2018 04:46) (95% - 100%)    I&O's Summary    2018 07:01  -  2018 07:00  --------------------------------------------------------  IN: 1700 mL / OUT: 0 mL / NET: 1700 mL    PHYSICAL EXAM:  GENERAL: NAD, well-developed  HEAD:  Atraumatic, Normocephalic  EYES: EOMI, conjunctiva and sclera clear  NECK: Supple, No JVD  CHEST/LUNG: Clear to auscultation bilaterally; No wheeze  HEART: Regular rate and rhythm; II/VI holosystolic murmur best auscultated at left sternal border; no rubs, or gallops  ABDOMEN: Soft, Nontender, Nondistended; Bowel sounds present  EXTREMITIES:  2+ Peripheral Pulses, No clubbing, cyanosis, or edema  PSYCH: AAOx3  NEUROLOGY: non-focal  SKIN: No rashes or lesions    LABS                        12.4   8.8   )-----------( 188      ( 2018 07:01 )             38.9                         11.1   8.67  )-----------( 198      ( 2018 08:11 )             34.1         138  |  107  |  27<H>  ----------------------------<  63<L>  4.3   |  19<L>  |  1.42<H>      142  |  107  |  36<H>  ----------------------------<  95  3.9   |  21<L>  |  1.70<H>    Ca    8.4      2018 07:  Ca    8.1<L>      2018 05:11  Phos  2.7       Mg     2.0         TPro  7.0  /  Alb  3.4  /  TBili  1.8<H>  /  DBili  x   /  AST  83<H>  /  ALT  177<H>  /  AlkPhos  175<H>    TPro  6.5  /  Alb  3.6  /  TBili  2.9<H>  /  DBili  2.4<H>  /  AST  165<H>  /  ALT  235<H>  /  AlkPhos  159<H>      PT/INR - ( 2018 16:49 )   PT: 12.8 sec;   INR: 1.18 ratio    PTT - ( 2018 16:49 )  PTT:25.7 sec    Urinalysis Basic - ( 2018 15:50 )    Color: Yellow / Appearance: SL Turbid / S.010 / pH: x  Gluc: x / Ketone: Negative  / Bili: Negative / Urobili: 1 mg/dL   Blood: x / Protein: 30 mg/dL / Nitrite: Negative   Leuk Esterase: Large / RBC: 0-2 /HPF / WBC 6-10 /HPF   Sq Epi: x / Non Sq Epi: OCC /HPF / Bacteria: Few /HPF    MEDICATIONS  (STANDING):  atorvastatin 40 milliGRAM(s) Oral at bedtime  heparin  Injectable 5000 Unit(s) SubCutaneous every 8 hours  piperacillin/tazobactam IVPB. 3.375 Gram(s) IV Intermittent every 12 hours  sodium chloride 0.9%. 1000 milliLiter(s) (75 mL/Hr) IV Continuous <Continuous>

## 2018-07-27 NOTE — PROGRESS NOTE ADULT - SUBJECTIVE AND OBJECTIVE BOX
General Surgery Progress Note    S: Patient seen and examined.  No events overnight.  No complaints.      Physical Exam  T(C): 36.4 (18 @ 04:46), Max: 36.6 (18 @ 13:25)  HR: 65 (18 @ 04:46) (60 - 65)  BP: 160/85 (18 @ 04:46) (148/82 - 176/74)  RR: 18 (18 @ 04:46) (18 - 18)  SpO2: 95% (18 @ 04:46) (95% - 100%)  Wt(kg): --     @ 07:01  -   @ 07:00  --------------------------------------------------------  IN:    IV PiggyBack: 200 mL    sodium chloride 0.9%: 750 mL    sodium chloride 0.9%.: 750 mL  Total IN: 1700 mL    OUT:  Total OUT: 0 mL    Total NET: 1700 mL          Gen: NAD  HEENT: normocephalic, atraumatic, no scleral icterus  Abd: Soft, ND, NT  Ext: warm, no edema    Labs:                                   12.4   8.8   )-----------( 188      ( 2018 07:01 )             38.9         138  |  107  |  27<H>  ----------------------------<  63<L>  4.3   |  19<L>  |  1.42<H>    Ca    8.4      2018 07:01  Phos  2.7       Mg     2.0         TPro  7.0  /  Alb  3.4  /  TBili  1.8<H>  /  DBili  x   /  AST  83<H>  /  ALT  177<H>  /  AlkPhos  175<H>      LIVER FUNCTIONS - ( 2018 07:01 )  Alb: 3.4 g/dL / Pro: 7.0 g/dL / ALK PHOS: 175 U/L / ALT: 177 U/L / AST: 83 U/L / GGT: x           PT/INR - ( 2018 16:49 )   PT: 12.8 sec;   INR: 1.18 ratio         PTT - ( 2018 16:49 )  PTT:25.7 sec  Urinalysis Basic - ( 2018 15:50 )    Color: Yellow / Appearance: SL Turbid / S.010 / pH: x  Gluc: x / Ketone: Negative  / Bili: Negative / Urobili: 1 mg/dL   Blood: x / Protein: 30 mg/dL / Nitrite: Negative   Leuk Esterase: Large / RBC: 0-2 /HPF / WBC 6-10 /HPF   Sq Epi: x / Non Sq Epi: OCC /HPF / Bacteria: Few /HPF        Imaging    < from: CT Abdomen and Pelvis No Cont (18 @ 19:06) >  IMPRESSION:     Acute compression fracture of L4 with at least 40% vertebral body height   loss and associated mild bony retropulsion.    Moderately distended gallbladder with small stones versus sludge. Acute   cholecystitis is not excluded. A HIDA scan can be obtained for complete   evaluation as clinically indicated.    Mild diffuse bladder wall thickening. Correlate clinically to exclude   infection.    A 2.9 cm left adnexal cyst, indeterminant. Pelvic ultrasound can be   obtained for further evaluation as clinically indicated.    Edema and air droplets noted of the visualized right upper extremity   adjacent to the elbow. Clinical correlation suggested.    < end of copied text >    < from: US Abdomen Complete (18 @ 21:54) >  IMPRESSION:     Gallbladder distention and wall thickening with cholelithiasis and   sludge. Negative sonographic Jimenez sign. Findings are equivocal for   acute cholecystitis. Further evaluation with dynamic HIDA scan may be   obtained if clinical suspicion for cholecystitis persists.    < end of copied text >

## 2018-07-27 NOTE — PROGRESS NOTE ADULT - ASSESSMENT
89yo F with suspected acute cholecystitis, found to also have pancreatitis (elevated lipase). Etiology likely gallstones given concurrent biliary presentation, pain improved and LFTs downtrending  - Aggressive IV hydration for pancreatitis  - continue zosyn for suspected cholangitis and for UTI  - LFTs trending down  - Following resolution of pancreatitis, kaylen not offer elective cholecystectomy to this 89F who is likely a poor surgical candidate  - D/w Dr. Esha Evangelista PGY2  [id number]

## 2018-07-27 NOTE — PROGRESS NOTE ADULT - SUBJECTIVE AND OBJECTIVE BOX
INTERVAL HPI/OVERNIGHT EVENTS: Pt seen and examined. Pt denies abdominal pain, nausea, or vomiting. Afebrile.    MEDICATIONS  (STANDING):  amoxicillin  875 milliGRAM(s)/clavulanate 1 Tablet(s) Oral daily  atorvastatin 40 milliGRAM(s) Oral at bedtime  heparin  Injectable 5000 Unit(s) SubCutaneous every 8 hours  sodium chloride 0.9%. 1000 milliLiter(s) (75 mL/Hr) IV Continuous <Continuous>    MEDICATIONS  (PRN):      Vital Signs Last 24 Hrs  T(C): 36.5 (2018 09:15), Max: 36.6 (2018 13:25)  T(F): 97.7 (2018 09:15), Max: 97.8 (2018 13:25)  HR: 62 (2018 09:15) (60 - 65)  BP: 165/64 (2018 09:15) (148/82 - 176/74)  BP(mean): --  RR: 18 (2018 09:15) (18 - 18)  SpO2: 99% (2018 09:15) (95% - 100%)    PHYSICAL EXAM:      Constitutional: NAD, lying in bed pleasantly    Gastrointestinal: Abd soft, NT, ND          I&O's Detail    2018 07:01  -  2018 07:00  --------------------------------------------------------  IN:    IV PiggyBack: 200 mL    sodium chloride 0.9%: 750 mL    sodium chloride 0.9%.: 750 mL  Total IN: 1700 mL    OUT:  Total OUT: 0 mL    Total NET: 1700 mL          LABS:                        12.4   8.8   )-----------( 188      ( 2018 07:01 )             38.9     0727    138  |  107  |  27<H>  ----------------------------<  63<L>  4.3   |  19<L>  |  1.42<H>    Ca    8.4      2018 07:01  Phos  2.7       Mg     2.0         TPro  7.0  /  Alb  3.4  /  TBili  1.8<H>  /  DBili  x   /  AST  83<H>  /  ALT  177<H>  /  AlkPhos  175<H>      PT/INR - ( 2018 16:49 )   PT: 12.8 sec;   INR: 1.18 ratio         PTT - ( 2018 16:49 )  PTT:25.7 sec  Urinalysis Basic - ( 2018 15:50 )    Color: Yellow / Appearance: SL Turbid / S.010 / pH: x  Gluc: x / Ketone: Negative  / Bili: Negative / Urobili: 1 mg/dL   Blood: x / Protein: 30 mg/dL / Nitrite: Negative   Leuk Esterase: Large / RBC: 0-2 /HPF / WBC 6-10 /HPF   Sq Epi: x / Non Sq Epi: OCC /HPF / Bacteria: Few /HPF        RADIOLOGY & ADDITIONAL STUDIES:

## 2018-07-27 NOTE — PROGRESS NOTE ADULT - PROBLEM SELECTOR PLAN 2
-n/v, elevated lipase 244, possibly gallstone pancreatitis--> downtrended to 100 still without abdominal pain  -TTE in 2012 with EF of 65%, euvolemic on exam, CXR with clear lungs  -however given age, will continue IVF @75cc/hour  - advance diet as tolerated

## 2018-07-27 NOTE — DISCHARGE NOTE ADULT - CARE PLAN
Principal Discharge DX:	Acute cystitis without hematuria  Goal:	Completion of antibiotic course  Assessment and plan of treatment:	You presented with report of cloudy and foul smelling urine. CT scan, showed inflammation of the bladder wall, and urine studies concerning for a UTI. You received antibiotics while admitted, please continue to take your medications as prescribed.  Secondary Diagnosis:	Gallstone pancreatitis  Assessment and plan of treatment:	You had an elevated lab values concerning for gallstone pancreatitis. For this we gave you a bowel rest, and treated you with IV hydration.  Secondary Diagnosis:	Cholecystitis  Assessment and plan of treatment:	Although you had no abdominal pain, you reported nausea with imaging concerning for acute cholecystitis. You improved with antibiotics and IV fluids.  Secondary Diagnosis:	Hypertension, unspecified type  Assessment and plan of treatment:	You have a history of high blood pressure, please continue to take your medication as prescribed. Principal Discharge DX:	Acute cystitis without hematuria  Goal:	Completion of antibiotic course. Take all medications as prescribed.  Assessment and plan of treatment:	You presented with report of cloudy and foul smelling urine. CT scan, showed inflammation of the bladder wall, and urine studies concerning for a UTI. You received antibiotics while admitted, please continue to take your medications as prescribed.  Secondary Diagnosis:	Gallstone pancreatitis  Assessment and plan of treatment:	You had an elevated lab values concerning for gallstone pancreatitis. For this we gave you a bowel rest, and treated you with IV hydration.  Secondary Diagnosis:	Cholecystitis  Assessment and plan of treatment:	Although you had no abdominal pain, you reported nausea with imaging concerning for acute cholecystitis. You improved with antibiotics and IV fluids.  Secondary Diagnosis:	Hypertension, unspecified type  Goal:	Take the amlodipine as prescribed for high blood pressure.  Assessment and plan of treatment:	You have a history of high blood pressure, please continue to take your medication as prescribed.

## 2018-07-27 NOTE — DISCHARGE NOTE ADULT - HOME CARE AGENCY
[facility] -[phone] to start visit the day after discharge for RN eval , Home PT

## 2018-07-27 NOTE — DISCHARGE NOTE ADULT - PATIENT PORTAL LINK FT
You can access the [employer] Patient Portal, offered by [facility], by registering with the following website: [email or web address]

## 2018-07-27 NOTE — DISCHARGE NOTE ADULT - PLAN OF CARE
Completion of antibiotic course You presented with report of cloudy and foul smelling urine. CT scan, showed inflammation of the bladder wall, and urine studies concerning for a UTI. You received antibiotics while admitted, please continue to take your medications as prescribed. You had an elevated lab values concerning for gallstone pancreatitis. For this we gave you a bowel rest, and treated you with IV hydration. Although you had no abdominal pain, you reported nausea with imaging concerning for acute cholecystitis. You improved with antibiotics and IV fluids. You have a history of high blood pressure, please continue to take your medication as prescribed. Completion of antibiotic course. Take all medications as prescribed. Take the amlodipine as prescribed for high blood pressure.

## 2018-07-27 NOTE — DISCHARGE NOTE ADULT - CARE PROVIDER_API CALL
Shannan Paiz), Geriatric Medicine; Internal Medicine  [address] [address]  Phone: [phone]  Fax: [phone]

## 2018-07-27 NOTE — PROGRESS NOTE ADULT - ASSESSMENT
89 F hx of dementia (baseline AAOx1), atrial fibrillation (not on AC), PPM in 2012, HTN, HLD, mostly bed/wheelchair bound who presents with nausea and vomiting of 1 day duration, AMS, presentation presumably 2/2 cholecystitis vs UTI.

## 2018-07-27 NOTE — PROGRESS NOTE ADULT - PROBLEM SELECTOR PLAN 3
-cloudy urine per aide, UA with positive leukocyte esterase  -given poor history from pt, unable to endorse dysuria/increased frequency, will treat for UTI - pt. already on zosyn for cholecystitis  - f/u UCx

## 2018-07-27 NOTE — DISCHARGE NOTE ADULT - HOSPITAL COURSE
89F hx of dementia (baseline AAOx1), atrial fibrillation (not on AC), PPM in 2012, HTN, HLD, mostly bed/wheelchair bound who presents with nausea and vomiting of 1 day duration. Pt. was BIBEMS accompanied by her 24 hours HHA Darling and Son/HCP Tay. Since morning prior to admission pt. has been nauseous with multiple episodes of vomiting (unclear if bloody/bilious). Aide also noted that her urine was cloudy and foul smelling. Pt. had worsening weakness, at baseline she is mostly bed and wheelchair bound but does ambulate with assistance. Of note, pt was treated for a pan-sensitive E. faecalis UTI in 2014. She is a patient of Dr. Alegria last seen one month ago in June 2018. No fevers, chills, diarrhea per chart.     ED course   VS Tm 98.9, HR 66, /60 -->131/61 (after 1L NS bolus)  Labs - WBC 12.4, Cr 1.9, T. Bili 3.9, , , , Lipase 244, lactate 2.6, UA negative  CTAP and US with distended gallbladder with cholelithiasis/sludge ; CXR clear lungs  Received 1L NS bolus, ceftriaxone 1g, zosyn 3.375g    She continued to take IV zosyn, ceftriaxone was d/c'd, with IVF. She was eventually discharged after being transitioned to PO antibiotics, after resolved leukocytosis, afebrile, without nausea nor abdominal pain. 89F hx of dementia (baseline AAOx1), atrial fibrillation (not on AC), PPM in 2012, HTN, HLD, mostly bed/wheelchair bound who presents with nausea and vomiting of 1 day duration. Pt. was BIBEMS accompanied by her 24 hours HHA Darling and Son/HCP Tay. Since morning prior to admission pt. has been nauseous with multiple episodes of vomiting (unclear if bloody/bilious). Aide also noted that her urine was cloudy and foul smelling. Pt. had worsening weakness, at baseline she is mostly bed and wheelchair bound but does ambulate with assistance. Of note, pt was treated for a pan-sensitive E. faecalis UTI in 2014. She is a patient of Dr. Alegria last seen one month ago in June 2018. No fevers, chills, diarrhea per chart.     ED course   VS Tm 98.9, HR 66, /60 -->131/61 (after 1L NS bolus)  Labs - WBC 12.4, Cr 1.9, T. Bili 3.9, , , , Lipase 244, lactate 2.6, UA negative  CTAP and US with distended gallbladder with cholelithiasis/sludge ; CXR clear lungs  Received 1L NS bolus, ceftriaxone 1g, zosyn 3.375g    Being that CTAP and abdominal U/S were unequivocal for Acute cholecystitis, in conjunction with the lack of abdominal pain, and negative paiz's, the decision was made to continue with conservative treatment. Especially as this patient is a poor surgical candidate. For the co-existing gallstone pancreatitis, the patient was made NPO, given IVF. She continued to take IV zosyn, ceftriaxone was d/c'd, with IVF. She was eventually discharged after being transitioned to PO antibiotics of Keflex and Flagyl; resolved leukocytosis, afebrile, without nausea nor abdominal pain.     Diagnoses  #Acute cystitis without hematuria   #Acute Cholecystitis  #Gallstone pancreatitis  # Hypertension 89F hx of dementia (baseline AAOx1), atrial fibrillation (not on AC), PPM in 2012, HTN, HLD, mostly bed/wheelchair bound who presents with nausea and vomiting of 1 day duration. Pt. was BIBEMS accompanied by her 24 hours HHA Darling and Son/HCP Tay. Since morning prior to admission pt. has been nauseous with multiple episodes of vomiting (unclear if bloody/bilious). Aide also noted that her urine was cloudy and foul smelling. Pt. had worsening weakness, at baseline she is mostly bed and wheelchair bound but does ambulate with assistance. Of note, pt was treated for a pan-sensitive E. faecalis UTI in 2014. She is a patient of Dr. Alegria last seen one month ago in June 2018. No fevers, chills, diarrhea per chart.     ED course   VS Tm 98.9, HR 66, /60 -->131/61 (after 1L NS bolus)  Labs - WBC 12.4, Cr 1.9, T. Bili 3.9, , , , Lipase 244, lactate 2.6, UA negative  CTAP and US with distended gallbladder with cholelithiasis/sludge ; CXR clear lungs  Received 1L NS bolus, ceftriaxone 1g, zosyn 3.375g    Being that CTAP and abdominal U/S were unequivocal for Acute cholecystitis, in conjunction with the lack of abdominal pain, and negative paiz's, the decision was made to continue with conservative treatment. Especially as this patient is a poor surgical candidate. For the co-existing gallstone pancreatitis, the patient was made NPO, given IVF. She continued to take IV zosyn, ceftriaxone was d/c'd, with IVF. She was eventually discharged after being transitioned to PO antibiotics of Keflex and Flagyl; resolved leukocytosis, LFTs downtrended, afebrile, without nausea nor abdominal pain.     Diagnoses  #Acute cystitis without hematuria   #Acute Cholecystitis  #Gallstone pancreatitis  # Hypertension

## 2018-07-28 LAB
ALBUMIN SERPL ELPH-MCNC: 3.5 G/DL — SIGNIFICANT CHANGE UP (ref 3.3–5)
ALP SERPL-CCNC: 179 U/L — HIGH (ref 40–120)
ALT FLD-CCNC: 128 U/L — HIGH (ref 10–45)
ANION GAP SERPL CALC-SCNC: 14 MMOL/L — SIGNIFICANT CHANGE UP (ref 5–17)
AST SERPL-CCNC: 44 U/L — HIGH (ref 10–40)
BASOPHILS # BLD AUTO: 0 K/UL — SIGNIFICANT CHANGE UP (ref 0–0.2)
BASOPHILS NFR BLD AUTO: 0.4 % — SIGNIFICANT CHANGE UP (ref 0–2)
BILIRUB SERPL-MCNC: 1.5 MG/DL — HIGH (ref 0.2–1.2)
BUN SERPL-MCNC: 24 MG/DL — HIGH (ref 7–23)
CALCIUM SERPL-MCNC: 8.9 MG/DL — SIGNIFICANT CHANGE UP (ref 8.4–10.5)
CHLORIDE SERPL-SCNC: 100 MMOL/L — SIGNIFICANT CHANGE UP (ref 96–108)
CO2 SERPL-SCNC: 22 MMOL/L — SIGNIFICANT CHANGE UP (ref 22–31)
CREAT SERPL-MCNC: 1.5 MG/DL — HIGH (ref 0.5–1.3)
EOSINOPHIL # BLD AUTO: 0.2 K/UL — SIGNIFICANT CHANGE UP (ref 0–0.5)
EOSINOPHIL NFR BLD AUTO: 1.7 % — SIGNIFICANT CHANGE UP (ref 0–6)
GLUCOSE SERPL-MCNC: 130 MG/DL — HIGH (ref 70–99)
HCT VFR BLD CALC: 40.4 % — SIGNIFICANT CHANGE UP (ref 34.5–45)
HGB BLD-MCNC: 13 G/DL — SIGNIFICANT CHANGE UP (ref 11.5–15.5)
LYMPHOCYTES # BLD AUTO: 19.6 % — SIGNIFICANT CHANGE UP (ref 13–44)
LYMPHOCYTES # BLD AUTO: 2 K/UL — SIGNIFICANT CHANGE UP (ref 1–3.3)
MAGNESIUM SERPL-MCNC: 2 MG/DL — SIGNIFICANT CHANGE UP (ref 1.6–2.6)
MCHC RBC-ENTMCNC: 29.3 PG — SIGNIFICANT CHANGE UP (ref 27–34)
MCHC RBC-ENTMCNC: 32.2 GM/DL — SIGNIFICANT CHANGE UP (ref 32–36)
MCV RBC AUTO: 91 FL — SIGNIFICANT CHANGE UP (ref 80–100)
MONOCYTES # BLD AUTO: 0.9 K/UL — SIGNIFICANT CHANGE UP (ref 0–0.9)
MONOCYTES NFR BLD AUTO: 8.8 % — SIGNIFICANT CHANGE UP (ref 2–14)
NEUTROPHILS # BLD AUTO: 7.3 K/UL — SIGNIFICANT CHANGE UP (ref 1.8–7.4)
NEUTROPHILS NFR BLD AUTO: 69.5 % — SIGNIFICANT CHANGE UP (ref 43–77)
PHOSPHATE SERPL-MCNC: 2.4 MG/DL — LOW (ref 2.5–4.5)
PLATELET # BLD AUTO: 222 K/UL — SIGNIFICANT CHANGE UP (ref 150–400)
POTASSIUM SERPL-MCNC: 4 MMOL/L — SIGNIFICANT CHANGE UP (ref 3.5–5.3)
POTASSIUM SERPL-SCNC: 4 MMOL/L — SIGNIFICANT CHANGE UP (ref 3.5–5.3)
PROT SERPL-MCNC: 7 G/DL — SIGNIFICANT CHANGE UP (ref 6–8.3)
RBC # BLD: 4.44 M/UL — SIGNIFICANT CHANGE UP (ref 3.8–5.2)
RBC # FLD: 12.4 % — SIGNIFICANT CHANGE UP (ref 10.3–14.5)
SODIUM SERPL-SCNC: 136 MMOL/L — SIGNIFICANT CHANGE UP (ref 135–145)
WBC # BLD: 10.5 K/UL — SIGNIFICANT CHANGE UP (ref 3.8–10.5)
WBC # FLD AUTO: 10.5 K/UL — SIGNIFICANT CHANGE UP (ref 3.8–10.5)

## 2018-07-28 PROCEDURE — 99233 SBSQ HOSP IP/OBS HIGH 50: CPT | Mod: GC

## 2018-07-28 RX ORDER — SODIUM CHLORIDE 9 MG/ML
1000 INJECTION INTRAMUSCULAR; INTRAVENOUS; SUBCUTANEOUS
Qty: 0 | Refills: 0 | Status: DISCONTINUED | OUTPATIENT
Start: 2018-07-28 | End: 2018-07-29

## 2018-07-28 RX ORDER — SODIUM,POTASSIUM PHOSPHATES 278-250MG
1 POWDER IN PACKET (EA) ORAL ONCE
Qty: 0 | Refills: 0 | Status: COMPLETED | OUTPATIENT
Start: 2018-07-28 | End: 2018-07-28

## 2018-07-28 RX ORDER — SODIUM CHLORIDE 9 MG/ML
1000 INJECTION INTRAMUSCULAR; INTRAVENOUS; SUBCUTANEOUS
Qty: 0 | Refills: 0 | Status: DISCONTINUED | OUTPATIENT
Start: 2018-07-28 | End: 2018-07-28

## 2018-07-28 RX ORDER — AMLODIPINE BESYLATE 2.5 MG/1
5 TABLET ORAL DAILY
Qty: 0 | Refills: 0 | Status: DISCONTINUED | OUTPATIENT
Start: 2018-07-28 | End: 2018-07-29

## 2018-07-28 RX ADMIN — Medication 500 MILLIGRAM(S): at 23:14

## 2018-07-28 RX ADMIN — AMLODIPINE BESYLATE 5 MILLIGRAM(S): 2.5 TABLET ORAL at 12:01

## 2018-07-28 RX ADMIN — Medication 500 MILLIGRAM(S): at 15:35

## 2018-07-28 RX ADMIN — HEPARIN SODIUM 5000 UNIT(S): 5000 INJECTION INTRAVENOUS; SUBCUTANEOUS at 05:10

## 2018-07-28 RX ADMIN — ATORVASTATIN CALCIUM 40 MILLIGRAM(S): 80 TABLET, FILM COATED ORAL at 23:13

## 2018-07-28 RX ADMIN — Medication 500 MILLIGRAM(S): at 12:01

## 2018-07-28 RX ADMIN — HEPARIN SODIUM 5000 UNIT(S): 5000 INJECTION INTRAVENOUS; SUBCUTANEOUS at 15:35

## 2018-07-28 RX ADMIN — SODIUM CHLORIDE 50 MILLILITER(S): 9 INJECTION INTRAMUSCULAR; INTRAVENOUS; SUBCUTANEOUS at 12:05

## 2018-07-28 RX ADMIN — Medication 1 PACKET(S): at 12:01

## 2018-07-28 RX ADMIN — HEPARIN SODIUM 5000 UNIT(S): 5000 INJECTION INTRAVENOUS; SUBCUTANEOUS at 23:14

## 2018-07-28 RX ADMIN — Medication 500 MILLIGRAM(S): at 05:10

## 2018-07-28 NOTE — PROGRESS NOTE ADULT - PROBLEM SELECTOR PLAN 4
-Cr. 1.90 (baseline ~0.9)  -likely pre-renal in setting of nausea/vomiting, will check urine lytes  -s/p 1L NS bolus, c/w NS@75, monitor Cr, renally dose medications, avoid nephrotoxic medications -Cr. 1.90 on admission (baseline ~0.9)  -likely pre-renal in setting of nausea/vomiting.   -s/p 1L NS bolus, c/w NS for another 1L, monitor Cr, renally dose medications, avoid nephrotoxic medications

## 2018-07-28 NOTE — PROGRESS NOTE ADULT - PROBLEM SELECTOR PLAN 3
-cloudy urine per aide, UA with positive leukocyte esterase  -given poor history from pt, unable to endorse dysuria/increased frequency, will treat for UTI - pt. already on zosyn for cholecystitis  - f/u UCx -cloudy urine per aide, UA with positive leukocyte esterase  -given poor history from pt, unable to endorse dysuria/increased frequency, will treat for UTI - c/w Keflex

## 2018-07-28 NOTE — PROGRESS NOTE ADULT - PROBLEM SELECTOR PLAN 8
-DVT ppx: HSQ  -DIET: low fiber diet, advance diet as tolerated -DVT ppx: HSQ   -F/u PT recs.   -DIET: low fiber diet, advance diet as tolerated

## 2018-07-28 NOTE — PHYSICAL THERAPY INITIAL EVALUATION ADULT - ADDITIONAL COMMENTS
Pt. resides at home with home health aide 24 hours x 7 days per week.  Per chart pt. mostly wheel chair and bed bound but does ambulate short distances with assist

## 2018-07-28 NOTE — PROGRESS NOTE ADULT - SUBJECTIVE AND OBJECTIVE BOX
Josh Garcia MD [facility]  Internal Medicine PGY-1  Pager [facility]: [phone]; [facility] [id number]    Patient is a 89y old  Female who presents with a chief complaint of nausea and vomiting (27 Jul 2018 10:30)    SUBJECTIVE/OVERNIGHT EVENTS   No acute events overnight. Patient tolerating clear diet, without n/v. Reports no BM today. Denies fevers, chills, SOB. ROS otherwise negative.     OBJECTIVE  Vital Signs Last 24 Hrs  T(C): 36.9 (28 Jul 2018 05:05), Max: 36.9 (28 Jul 2018 00:42)  T(F): 98.5 (28 Jul 2018 05:05), Max: 98.5 (28 Jul 2018 05:05)  HR: 92 (28 Jul 2018 05:05) (56 - 92)  BP: 169/72 (28 Jul 2018 05:05) (146/66 - 169/72)  BP(mean): --  RR: 18 (28 Jul 2018 05:05) (16 - 18)  SpO2: 92% (28 Jul 2018 05:05) (92% - 99%)    I&O's Summary    27 Jul 2018 07:01  -  28 Jul 2018 07:00  --------------------------------------------------------  IN: 220 mL / OUT: 0 mL / NET: 220 mL    PHYSICAL EXAM:  GENERAL: NAD, well-developed  HEAD:  Atraumatic, Normocephalic  EYES: EOMI, conjunctiva and sclera clear  NECK: Supple, No JVD  CHEST/LUNG: Clear to auscultation bilaterally; No wheeze  HEART: Regular rate and rhythm; No murmurs, rubs, or gallops  ABDOMEN: Soft, Nontender, Nondistended; Bowel sounds present  EXTREMITIES:  2+ Peripheral Pulses, No clubbing, cyanosis, or edema  PSYCH: AAOx3  NEUROLOGY: non-focal  SKIN: No rashes or lesions    LABS                        13.0   10.5  )-----------( 222      ( 28 Jul 2018 06:46 )             40.4                         12.4   8.8   )-----------( 188      ( 27 Jul 2018 07:01 )             38.9     07-28    136  |  100  |  24<H>  ----------------------------<  130<H>  4.0   |  22  |  1.50<H>  07-27    138  |  107  |  27<H>  ----------------------------<  63<L>  4.3   |  19<L>  |  1.42<H>    Ca    8.9      28 Jul 2018 06:46  Ca    8.4      27 Jul 2018 07:01  Phos  2.4     07-28  Mg     2.0     07-28    TPro  7.0  /  Alb  3.5  /  TBili  1.5<H>  /  DBili  x   /  AST  44<H>  /  ALT  128<H>  /  AlkPhos  179<H>  07-28  TPro  7.0  /  Alb  3.4  /  TBili  1.8<H>  /  DBili  x   /  AST  83<H>  /  ALT  177<H>  /  AlkPhos  175<H>  07-27    MEDICATIONS  (STANDING):  atorvastatin 40 milliGRAM(s) Oral at bedtime  cephalexin 500 milliGRAM(s) Oral every 12 hours  heparin  Injectable 5000 Unit(s) SubCutaneous every 8 hours  metroNIDAZOLE    Tablet 500 milliGRAM(s) Oral every 8 hours

## 2018-07-28 NOTE — PATIENT PROFILE ADULT. - PAIN CHRONIC, PROFILE
Phone Number Called: [phone]    Message: Spoke with lab and stated hlpylori stool antigen or breath test and lactose tolerance test needs to be fasting in order for the lab to do the test.  Left Message for patient to call back: N\A      
no

## 2018-07-28 NOTE — PROGRESS NOTE ADULT - ATTENDING COMMENTS
-Patient seen and examined on 7/28/18. Case/plan discussed with Dr. Garcia as reviewed/edited by me above.

## 2018-07-28 NOTE — PHYSICAL THERAPY INITIAL EVALUATION ADULT - DISCHARGE DISPOSITION, PT EVAL
Home PT for home safety assessment and to maximize patient's functional mobility within the home/home w/ home PT

## 2018-07-28 NOTE — PROGRESS NOTE ADULT - PROBLEM SELECTOR PLAN 6
-labile, not on any medications at home  -given age, and acute setting liberal BP control  -monitor VS -labile, not on any medications at home  -Will start amlodipine 5mg PO daily   -monitor VS

## 2018-07-28 NOTE — PROGRESS NOTE ADULT - PROBLEM SELECTOR PLAN 2
-n/v, elevated lipase 244, possibly gallstone pancreatitis--> downtrended to 100 still without abdominal pain  - TTE in 2012 with EF of 65%, euvolemic on exam, CXR with clear lungs  - however given age, will continue IVF @75cc/hour  - advance diet as tolerated -n/v, elevated lipase 244, possibly gallstone pancreatitis--> downtrended to 100 still without abdominal pain  - TTE in 2012 with EF of 65%, euvolemic on exam, CXR with clear lungs  - however given age, will continue IVF @50cc/hour  - advance diet as tolerated -n/v, elevated lipase 244, possibly gallstone pancreatitis--> downtrended to 100 still without abdominal pain  - TTE in 2012 with EF of 65%, euvolemic on exam, CXR with clear lungs  - however given age, will continue IVF @50cc/hour for another liter.   - advance diet as tolerated - today low fiber.

## 2018-07-28 NOTE — PHYSICAL THERAPY INITIAL EVALUATION ADULT - BALANCE TRAINING, PT EVAL
GOAL: Pt. will improve static and dynamic balance to good in order to improve stability, decrease fall risk and increase independence with ADLs within 2 weeks

## 2018-07-28 NOTE — PROGRESS NOTE ADULT - PROBLEM SELECTOR PLAN 1
-nausea/vomiting, WBC 12.4, CTAP and US with gallbladder thickening with sludge and stones  -evaluated by surgery, not a candidate for cholecystectomy, will continue with medical management zosyn 3.375g BID,   Will consider HIDA scan and IR eval for perc laxmi if condition does not improve- right now afebrile with a neg Jimenez's   -trend LFTs -nausea/vomiting, WBC 12.4, CTAP and US with gallbladder thickening with sludge and stones  -evaluated by surgery, not a candidate for cholecystectomy, will continue with medical management now cephalexin and flagyl PO.   -Will consider HIDA scan and IR eval for perc laxmi if condition does not improve- right now afebrile with a neg Jimenez's   -trend LFTs

## 2018-07-29 VITALS
TEMPERATURE: 98 F | RESPIRATION RATE: 18 BRPM | OXYGEN SATURATION: 98 % | DIASTOLIC BLOOD PRESSURE: 63 MMHG | HEART RATE: 61 BPM | SYSTOLIC BLOOD PRESSURE: 148 MMHG

## 2018-07-29 LAB
ALBUMIN SERPL ELPH-MCNC: 3.6 G/DL — SIGNIFICANT CHANGE UP (ref 3.3–5)
ALP SERPL-CCNC: 187 U/L — HIGH (ref 40–120)
ALT FLD-CCNC: 99 U/L — HIGH (ref 10–45)
ANION GAP SERPL CALC-SCNC: 11 MMOL/L — SIGNIFICANT CHANGE UP (ref 5–17)
AST SERPL-CCNC: 40 U/L — SIGNIFICANT CHANGE UP (ref 10–40)
BILIRUB SERPL-MCNC: 1 MG/DL — SIGNIFICANT CHANGE UP (ref 0.2–1.2)
BUN SERPL-MCNC: 24 MG/DL — HIGH (ref 7–23)
CALCIUM SERPL-MCNC: 8.5 MG/DL — SIGNIFICANT CHANGE UP (ref 8.4–10.5)
CHLORIDE SERPL-SCNC: 106 MMOL/L — SIGNIFICANT CHANGE UP (ref 96–108)
CO2 SERPL-SCNC: 21 MMOL/L — LOW (ref 22–31)
CREAT SERPL-MCNC: 1.54 MG/DL — HIGH (ref 0.5–1.3)
GLUCOSE SERPL-MCNC: 160 MG/DL — HIGH (ref 70–99)
HCT VFR BLD CALC: 37.2 % — SIGNIFICANT CHANGE UP (ref 34.5–45)
HGB BLD-MCNC: 11.7 G/DL — SIGNIFICANT CHANGE UP (ref 11.5–15.5)
MCHC RBC-ENTMCNC: 28.8 PG — SIGNIFICANT CHANGE UP (ref 27–34)
MCHC RBC-ENTMCNC: 31.6 GM/DL — LOW (ref 32–36)
MCV RBC AUTO: 91.1 FL — SIGNIFICANT CHANGE UP (ref 80–100)
PLATELET # BLD AUTO: 191 K/UL — SIGNIFICANT CHANGE UP (ref 150–400)
POTASSIUM SERPL-MCNC: 4.2 MMOL/L — SIGNIFICANT CHANGE UP (ref 3.5–5.3)
POTASSIUM SERPL-SCNC: 4.2 MMOL/L — SIGNIFICANT CHANGE UP (ref 3.5–5.3)
PROT SERPL-MCNC: 6.8 G/DL — SIGNIFICANT CHANGE UP (ref 6–8.3)
RBC # BLD: 4.08 M/UL — SIGNIFICANT CHANGE UP (ref 3.8–5.2)
RBC # FLD: 12.5 % — SIGNIFICANT CHANGE UP (ref 10.3–14.5)
SODIUM SERPL-SCNC: 138 MMOL/L — SIGNIFICANT CHANGE UP (ref 135–145)
WBC # BLD: 9.7 K/UL — SIGNIFICANT CHANGE UP (ref 3.8–10.5)
WBC # FLD AUTO: 9.7 K/UL — SIGNIFICANT CHANGE UP (ref 3.8–10.5)

## 2018-07-29 PROCEDURE — 82435 ASSAY OF BLOOD CHLORIDE: CPT

## 2018-07-29 PROCEDURE — 83690 ASSAY OF LIPASE: CPT

## 2018-07-29 PROCEDURE — 81001 URINALYSIS AUTO W/SCOPE: CPT

## 2018-07-29 PROCEDURE — 71045 X-RAY EXAM CHEST 1 VIEW: CPT

## 2018-07-29 PROCEDURE — 87040 BLOOD CULTURE FOR BACTERIA: CPT

## 2018-07-29 PROCEDURE — 82248 BILIRUBIN DIRECT: CPT

## 2018-07-29 PROCEDURE — 82150 ASSAY OF AMYLASE: CPT

## 2018-07-29 PROCEDURE — 82947 ASSAY GLUCOSE BLOOD QUANT: CPT

## 2018-07-29 PROCEDURE — 85610 PROTHROMBIN TIME: CPT

## 2018-07-29 PROCEDURE — 87086 URINE CULTURE/COLONY COUNT: CPT

## 2018-07-29 PROCEDURE — 99239 HOSP IP/OBS DSCHRG MGMT >30: CPT

## 2018-07-29 PROCEDURE — 85027 COMPLETE CBC AUTOMATED: CPT

## 2018-07-29 PROCEDURE — 82330 ASSAY OF CALCIUM: CPT

## 2018-07-29 PROCEDURE — 51701 INSERT BLADDER CATHETER: CPT

## 2018-07-29 PROCEDURE — 84132 ASSAY OF SERUM POTASSIUM: CPT

## 2018-07-29 PROCEDURE — 96365 THER/PROPH/DIAG IV INF INIT: CPT | Mod: XU

## 2018-07-29 PROCEDURE — 76705 ECHO EXAM OF ABDOMEN: CPT

## 2018-07-29 PROCEDURE — 80053 COMPREHEN METABOLIC PANEL: CPT

## 2018-07-29 PROCEDURE — 83605 ASSAY OF LACTIC ACID: CPT

## 2018-07-29 PROCEDURE — 93005 ELECTROCARDIOGRAM TRACING: CPT | Mod: XU

## 2018-07-29 PROCEDURE — 74176 CT ABD & PELVIS W/O CONTRAST: CPT

## 2018-07-29 PROCEDURE — 84295 ASSAY OF SERUM SODIUM: CPT

## 2018-07-29 PROCEDURE — 85730 THROMBOPLASTIN TIME PARTIAL: CPT

## 2018-07-29 PROCEDURE — 84100 ASSAY OF PHOSPHORUS: CPT

## 2018-07-29 PROCEDURE — 97161 PT EVAL LOW COMPLEX 20 MIN: CPT

## 2018-07-29 PROCEDURE — 99285 EMERGENCY DEPT VISIT HI MDM: CPT | Mod: 25

## 2018-07-29 PROCEDURE — 83735 ASSAY OF MAGNESIUM: CPT

## 2018-07-29 PROCEDURE — 87186 SC STD MICRODIL/AGAR DIL: CPT

## 2018-07-29 PROCEDURE — 76700 US EXAM ABDOM COMPLETE: CPT

## 2018-07-29 PROCEDURE — 82803 BLOOD GASES ANY COMBINATION: CPT

## 2018-07-29 PROCEDURE — 85014 HEMATOCRIT: CPT

## 2018-07-29 PROCEDURE — 96367 TX/PROPH/DG ADDL SEQ IV INF: CPT

## 2018-07-29 RX ORDER — AMLODIPINE BESYLATE 2.5 MG/1
1 TABLET ORAL
Qty: 30 | Refills: 0
Start: 2018-07-29 | End: 2018-08-27

## 2018-07-29 RX ORDER — AMLODIPINE BESYLATE 2.5 MG/1
5 TABLET ORAL ONCE
Qty: 0 | Refills: 0 | Status: COMPLETED | OUTPATIENT
Start: 2018-07-29 | End: 2018-07-29

## 2018-07-29 RX ORDER — METRONIDAZOLE 500 MG
1 TABLET ORAL
Qty: 9 | Refills: 0 | OUTPATIENT
Start: 2018-07-29 | End: 2018-07-31

## 2018-07-29 RX ORDER — CEPHALEXIN 500 MG
1 CAPSULE ORAL
Qty: 6 | Refills: 0 | OUTPATIENT
Start: 2018-07-29 | End: 2018-07-31

## 2018-07-29 RX ADMIN — Medication 500 MILLIGRAM(S): at 05:18

## 2018-07-29 RX ADMIN — Medication 500 MILLIGRAM(S): at 11:01

## 2018-07-29 RX ADMIN — AMLODIPINE BESYLATE 5 MILLIGRAM(S): 2.5 TABLET ORAL at 02:26

## 2018-07-29 RX ADMIN — HEPARIN SODIUM 5000 UNIT(S): 5000 INJECTION INTRAVENOUS; SUBCUTANEOUS at 05:18

## 2018-07-29 RX ADMIN — SODIUM CHLORIDE 50 MILLILITER(S): 9 INJECTION INTRAMUSCULAR; INTRAVENOUS; SUBCUTANEOUS at 05:22

## 2018-07-29 NOTE — PROGRESS NOTE ADULT - PROBLEM SELECTOR PLAN 4
-Cr. 1.90 on admission (baseline ~0.9)  -likely pre-renal in setting of nausea/vomiting.   -s/p 1L NS bolus, c/w NS for another 1L, monitor Cr, renally dose medications, avoid nephrotoxic medications Scr 1.90 on admission (baseline 1.1-1.5 on prior admissions [id number]), today improved to 1.5 and stable from yesterday. Likely pre-renal in setting of nausea/vomiting and acute infection. Given improvement, will c/w plan to discharge home.  -s/p 1L NS bolus, c/w NS for another 1L, monitor Cr, renally dose medications, avoid nephrotoxic medications. Stable after IVF 50 cc/hr yesterday.

## 2018-07-29 NOTE — PROGRESS NOTE ADULT - PROBLEM SELECTOR PLAN 8
-DVT ppx: HSQ   -F/u PT recs.   -DIET: low fiber diet, advance diet as tolerated -DVT ppx: HSQ   -Dispo: Home with home PT  -DIET: low fiber diet, advance diet as tolerated -DVT ppx: HSQ   -Dispo: Home with home PT - Will DC to home today.   -DIET: low fiber diet, advance diet as tolerated

## 2018-07-29 NOTE — PROGRESS NOTE ADULT - PROBLEM SELECTOR PROBLEM 4
DENISE (acute kidney injury)

## 2018-07-29 NOTE — PROGRESS NOTE ADULT - ATTENDING COMMENTS
-Patient seen and examined on 7/29/18. Case/plan discussed with Dr. Spangler as reviewed/edited by me above. -Patient seen and examined on 7/29/18. Case/plan discussed with Dr. Spangler as reviewed/edited by me above.  -35 minutes spent on coordinating the discharge process.

## 2018-07-29 NOTE — PROGRESS NOTE ADULT - PROBLEM SELECTOR PLAN 6
-labile, not on any medications at home  -Will start amlodipine 5mg PO daily   -monitor VS 's after receiving dose of amlodipine early overnight. Suspect HTN exacerbated by IV fluids 50 cc/hr started yesterday. Holding fluids. BP improving.  -labile, not on any medications at home  -c/w amlodipine 5mg daily started yesterday.

## 2018-07-29 NOTE — PROGRESS NOTE ADULT - SUBJECTIVE AND OBJECTIVE BOX
to be completed Dmitry Spangler  Medicine Team 3 Resident, PGY-3  Pager [phone] / [phone] / [phone] after 7pm (12pm on weekends)    Patient is a 89y old  Female who presents with a chief complaint of nausea and vomiting (27 Jul 2018 10:30)      SUBJECTIVE / OVERNIGHT EVENTS:  Overnight patient had 's, asymptomatic. This AM she feels very well and wants to go home; it [birth year] birthday. Patient denies fevers, chills, cough, runny nose, sore throat, chest pain, palpitations, abdominal pain, dysuria, change in bowel habits, LE pain or swelling, new skin rashes, confusion, dizziness, light-headedness, new weakness or numbness.     MEDICATIONS  (STANDING):  amLODIPine   Tablet 5 milliGRAM(s) Oral daily  atorvastatin 40 milliGRAM(s) Oral at bedtime  cephalexin 500 milliGRAM(s) Oral every 12 hours  heparin  Injectable 5000 Unit(s) SubCutaneous every 8 hours  metroNIDAZOLE    Tablet 500 milliGRAM(s) Oral every 8 hours    MEDICATIONS  (PRN): n/a    CAPILLARY BLOOD GLUCOSE: n/a    I&O's Summary    28 Jul 2018 07:01  -  29 Jul 2018 07:00  --------------------------------------------------------  IN: 1120 mL / OUT: 0 mL / NET: 1120 mL      PHYSICAL EXAM:      Vital Signs Last 24 Hrs  T(C): 36.9 (29 Jul 2018 04:49), Max: 37.2 (28 Jul 2018 21:53)  T(F): 98.4 (29 Jul 2018 04:49), Max: 98.9 (28 Jul 2018 21:53)  HR: 68 (29 Jul 2018 04:49) (60 - 76)  BP: 161/82 (29 Jul 2018 04:49) (106/73 - 181/83)  BP(mean): --  RR: 18 (29 Jul 2018 04:49) (16 - 18)  SpO2: 97% (29 Jul 2018 04:49) (95% - 98%)    SKIN: No rashes or lesions  GENERAL: elderly female lying in bed in NAD, awake and alert, breathing comfortably  HEAD:  Atraumatic, Normocephalic  EYES: EOMI, conjunctiva and sclera clear  NECK: Supple, No JVD  CHEST/LUNG: CTAB no wheezes  HEART: RRR, no new murmurs rubs or gallops, +S1/2  ABDOMEN: Soft, Nontender, Nondistended; Bowel sounds present  EXTREMITIES:  2+ Peripheral Pulses, No clubbing, cyanosis, or edema  PSYCH: AAOx3 this AM  NEUROLOGY: non-focal  SKIN: dry skin LE's bilaterally, scattered cherry angiomas, seborrheic keratoses    LABS:                        11.7   9.7   )-----------( 191      ( 29 Jul 2018 06:24 )             37.2     07-29    138  |  106  |  24<H>  ----------------------------<  160<H>  4.2   |  21<L>  |  1.54<H>    Ca    8.5      29 Jul 2018 06:24  Phos  2.4     07-28  Mg     2.0     07-28    TPro  6.8  /  Alb  3.6  /  TBili  1.0  /  DBili  x   /  AST  40  /  ALT  99<H>  /  AlkPhos  187<H>  07-29      RADIOLOGY & ADDITIONAL TESTS:    Imaging Personally Reviewed: no new    Consultant(s) Notes Reviewed: no new    Care Discussed with Consultants/Other Providers:  pending

## 2018-07-29 NOTE — PROGRESS NOTE ADULT - PROBLEM SELECTOR PLAN 3
-cloudy urine per aide, UA with positive leukocyte esterase  -given poor history from pt, unable to endorse dysuria/increased frequency, will treat for UTI - c/w Keflex Prior UTI's with urine culture growing E coli. Unclear if patient was symptomatic at time of admission as altered at that time in setting of acute infection. Treating appropriately with cephalexin.   -cloudy urine per aide, UA with positive leukocyte esterase  -given poor history from pt, unable to endorse dysuria/increased frequency, will treat for UTI - c/w Keflex, 7 day course.

## 2018-07-29 NOTE — PROGRESS NOTE ADULT - PROBLEM SELECTOR PLAN 7
-c/w atorvastatin 40mg daily

## 2018-07-29 NOTE — PROGRESS NOTE ADULT - PROBLEM SELECTOR PLAN 2
-n/v, elevated lipase 244, possibly gallstone pancreatitis--> downtrended to 100 still without abdominal pain  - TTE in 2012 with EF of 65%, euvolemic on exam, CXR with clear lungs  - however given age, will continue IVF @50cc/hour for another liter.   - advance diet as tolerated - today low fiber. Mild lipase elevated 244 on admission possibly gallstone pancreatitis--> downtrended to 100 still without abdominal pain. Bilirubin normalized. Resolved.  - fluids held given hypertension this AM.  - tolerating advanced diet.

## 2018-07-29 NOTE — PROGRESS NOTE ADULT - PROBLEM SELECTOR PLAN 1
-nausea/vomiting, WBC 12.4, CTAP and US with gallbladder thickening with sludge and stones  -evaluated by surgery, not a candidate for cholecystectomy, will continue with medical management now cephalexin and flagyl PO.   -Will consider HIDA scan and IR eval for perc laxmi if condition does not improve- right now afebrile with a neg Jimenez's   -trend LFTs WBC continues to downtrend wnl's now, n/v resolved. Absent abdominal pain on exam with BM's reported.   -evaluated by surgery, not a candidate for cholecystectomy.  -c/w cephalexin and Flagyl for 7-day course.   -no need for HIDA as condition improved.   -LFT's improved.

## 2018-07-31 ENCOUNTER — INPATIENT (INPATIENT)
Facility: HOSPITAL | Age: 83
LOS: 3 days | Discharge: HOME | DRG: 689 | End: 2018-08-04
Attending: HOSPITALIST | Admitting: STUDENT IN AN ORGANIZED HEALTH CARE EDUCATION/TRAINING PROGRAM
Payer: MEDICARE

## 2018-07-31 VITALS
TEMPERATURE: 101 F | SYSTOLIC BLOOD PRESSURE: 138 MMHG | HEART RATE: 70 BPM | RESPIRATION RATE: 16 BRPM | DIASTOLIC BLOOD PRESSURE: 78 MMHG | OXYGEN SATURATION: 96 %

## 2018-07-31 DIAGNOSIS — I10 ESSENTIAL (PRIMARY) HYPERTENSION: ICD-10-CM

## 2018-07-31 DIAGNOSIS — Z29.9 ENCOUNTER FOR PROPHYLACTIC MEASURES, UNSPECIFIED: ICD-10-CM

## 2018-07-31 DIAGNOSIS — E78.5 HYPERLIPIDEMIA, UNSPECIFIED: ICD-10-CM

## 2018-07-31 DIAGNOSIS — R63.8 OTHER SYMPTOMS AND SIGNS CONCERNING FOOD AND FLUID INTAKE: ICD-10-CM

## 2018-07-31 DIAGNOSIS — A41.9 SEPSIS, UNSPECIFIED ORGANISM: ICD-10-CM

## 2018-07-31 DIAGNOSIS — I48.91 UNSPECIFIED ATRIAL FIBRILLATION: ICD-10-CM

## 2018-07-31 DIAGNOSIS — R50.9 FEVER, UNSPECIFIED: ICD-10-CM

## 2018-07-31 DIAGNOSIS — R65.10 SYSTEMIC INFLAMMATORY RESPONSE SYNDROME (SIRS) OF NON-INFECTIOUS ORIGIN WITHOUT ACUTE ORGAN DYSFUNCTION: ICD-10-CM

## 2018-07-31 DIAGNOSIS — R41.82 ALTERED MENTAL STATUS, UNSPECIFIED: ICD-10-CM

## 2018-07-31 DIAGNOSIS — I25.10 ATHEROSCLEROTIC HEART DISEASE OF NATIVE CORONARY ARTERY WITHOUT ANGINA PECTORIS: ICD-10-CM

## 2018-07-31 LAB
ALBUMIN SERPL ELPH-MCNC: 3.4 G/DL — SIGNIFICANT CHANGE UP (ref 3.3–5)
ALP SERPL-CCNC: 145 U/L — HIGH (ref 40–120)
ALT FLD-CCNC: 68 U/L — HIGH (ref 10–45)
ANION GAP SERPL CALC-SCNC: 12 MMOL/L — SIGNIFICANT CHANGE UP (ref 5–17)
APPEARANCE UR: CLEAR — SIGNIFICANT CHANGE UP
APTT BLD: 30.1 SEC — SIGNIFICANT CHANGE UP (ref 27.5–37.4)
AST SERPL-CCNC: 32 U/L — SIGNIFICANT CHANGE UP (ref 10–40)
BACTERIA # UR AUTO: ABNORMAL /HPF
BASOPHILS # BLD AUTO: 0 K/UL — SIGNIFICANT CHANGE UP (ref 0–0.2)
BASOPHILS NFR BLD AUTO: 0.1 % — SIGNIFICANT CHANGE UP (ref 0–2)
BILIRUB SERPL-MCNC: 0.9 MG/DL — SIGNIFICANT CHANGE UP (ref 0.2–1.2)
BILIRUB UR-MCNC: NEGATIVE — SIGNIFICANT CHANGE UP
BUN SERPL-MCNC: 29 MG/DL — HIGH (ref 7–23)
CALCIUM SERPL-MCNC: 9.3 MG/DL — SIGNIFICANT CHANGE UP (ref 8.4–10.5)
CHLORIDE SERPL-SCNC: 102 MMOL/L — SIGNIFICANT CHANGE UP (ref 96–108)
CO2 SERPL-SCNC: 23 MMOL/L — SIGNIFICANT CHANGE UP (ref 22–31)
COLOR SPEC: YELLOW — SIGNIFICANT CHANGE UP
CREAT SERPL-MCNC: 1.78 MG/DL — HIGH (ref 0.5–1.3)
CULTURE RESULTS: NO GROWTH — SIGNIFICANT CHANGE UP
CULTURE RESULTS: SIGNIFICANT CHANGE UP
CULTURE RESULTS: SIGNIFICANT CHANGE UP
DIFF PNL FLD: ABNORMAL
EOSINOPHIL # BLD AUTO: 0 K/UL — SIGNIFICANT CHANGE UP (ref 0–0.5)
EOSINOPHIL NFR BLD AUTO: 0.3 % — SIGNIFICANT CHANGE UP (ref 0–6)
EPI CELLS # UR: SIGNIFICANT CHANGE UP /HPF
GAS PNL BLDV: SIGNIFICANT CHANGE UP
GLUCOSE SERPL-MCNC: 132 MG/DL — HIGH (ref 70–99)
GLUCOSE UR QL: 100 MG/DL
HCT VFR BLD CALC: 35.1 % — SIGNIFICANT CHANGE UP (ref 34.5–45)
HGB BLD-MCNC: 11.8 G/DL — SIGNIFICANT CHANGE UP (ref 11.5–15.5)
INR BLD: 1.17 RATIO — HIGH (ref 0.88–1.16)
KETONES UR-MCNC: NEGATIVE — SIGNIFICANT CHANGE UP
LEUKOCYTE ESTERASE UR-ACNC: NEGATIVE — SIGNIFICANT CHANGE UP
LIDOCAIN IGE QN: 32 U/L — SIGNIFICANT CHANGE UP (ref 7–60)
LYMPHOCYTES # BLD AUTO: 17.5 % — SIGNIFICANT CHANGE UP (ref 13–44)
LYMPHOCYTES # BLD AUTO: 2.7 K/UL — SIGNIFICANT CHANGE UP (ref 1–3.3)
MCHC RBC-ENTMCNC: 30.6 PG — SIGNIFICANT CHANGE UP (ref 27–34)
MCHC RBC-ENTMCNC: 33.7 GM/DL — SIGNIFICANT CHANGE UP (ref 32–36)
MCV RBC AUTO: 90.8 FL — SIGNIFICANT CHANGE UP (ref 80–100)
MONOCYTES # BLD AUTO: 1.4 K/UL — HIGH (ref 0–0.9)
MONOCYTES NFR BLD AUTO: 9.4 % — SIGNIFICANT CHANGE UP (ref 2–14)
NEUTROPHILS # BLD AUTO: 11.2 K/UL — HIGH (ref 1.8–7.4)
NEUTROPHILS NFR BLD AUTO: 72.7 % — SIGNIFICANT CHANGE UP (ref 43–77)
NITRITE UR-MCNC: NEGATIVE — SIGNIFICANT CHANGE UP
PH UR: 6 — SIGNIFICANT CHANGE UP (ref 5–8)
PLATELET # BLD AUTO: 205 K/UL — SIGNIFICANT CHANGE UP (ref 150–400)
POTASSIUM SERPL-MCNC: 4.1 MMOL/L — SIGNIFICANT CHANGE UP (ref 3.5–5.3)
POTASSIUM SERPL-SCNC: 4.1 MMOL/L — SIGNIFICANT CHANGE UP (ref 3.5–5.3)
PROT SERPL-MCNC: 7.1 G/DL — SIGNIFICANT CHANGE UP (ref 6–8.3)
PROT UR-MCNC: 100 MG/DL
PROTHROM AB SERPL-ACNC: 12.8 SEC — HIGH (ref 9.8–12.7)
RAPID RVP RESULT: SIGNIFICANT CHANGE UP
RBC # BLD: 3.87 M/UL — SIGNIFICANT CHANGE UP (ref 3.8–5.2)
RBC # FLD: 12.5 % — SIGNIFICANT CHANGE UP (ref 10.3–14.5)
RBC CASTS # UR COMP ASSIST: SIGNIFICANT CHANGE UP /HPF (ref 0–2)
SODIUM SERPL-SCNC: 137 MMOL/L — SIGNIFICANT CHANGE UP (ref 135–145)
SP GR SPEC: 1.02 — SIGNIFICANT CHANGE UP (ref 1.01–1.02)
SPECIMEN SOURCE: SIGNIFICANT CHANGE UP
UROBILINOGEN FLD QL: NEGATIVE — SIGNIFICANT CHANGE UP
WBC # BLD: 15.4 K/UL — HIGH (ref 3.8–10.5)
WBC # FLD AUTO: 15.4 K/UL — HIGH (ref 3.8–10.5)
WBC UR QL: SIGNIFICANT CHANGE UP /HPF (ref 0–5)

## 2018-07-31 PROCEDURE — 70450 CT HEAD/BRAIN W/O DYE: CPT | Mod: 26

## 2018-07-31 PROCEDURE — 99223 1ST HOSP IP/OBS HIGH 75: CPT

## 2018-07-31 PROCEDURE — 99285 EMERGENCY DEPT VISIT HI MDM: CPT | Mod: GC

## 2018-07-31 PROCEDURE — 71045 X-RAY EXAM CHEST 1 VIEW: CPT | Mod: 26

## 2018-07-31 PROCEDURE — 74176 CT ABD & PELVIS W/O CONTRAST: CPT | Mod: 26

## 2018-07-31 PROCEDURE — 71250 CT THORAX DX C-: CPT | Mod: 26

## 2018-07-31 RX ORDER — PIPERACILLIN AND TAZOBACTAM 4; .5 G/20ML; G/20ML
3.38 INJECTION, POWDER, LYOPHILIZED, FOR SOLUTION INTRAVENOUS ONCE
Qty: 0 | Refills: 0 | Status: COMPLETED | OUTPATIENT
Start: 2018-07-31 | End: 2018-07-31

## 2018-07-31 RX ORDER — PIPERACILLIN AND TAZOBACTAM 4; .5 G/20ML; G/20ML
3.38 INJECTION, POWDER, LYOPHILIZED, FOR SOLUTION INTRAVENOUS EVERY 8 HOURS
Qty: 0 | Refills: 0 | Status: DISCONTINUED | OUTPATIENT
Start: 2018-07-31 | End: 2018-08-04

## 2018-07-31 RX ORDER — VANCOMYCIN HCL 1 G
1000 VIAL (EA) INTRAVENOUS ONCE
Qty: 0 | Refills: 0 | Status: DISCONTINUED | OUTPATIENT
Start: 2018-07-31 | End: 2018-07-31

## 2018-07-31 RX ORDER — SODIUM CHLORIDE 9 MG/ML
1000 INJECTION INTRAMUSCULAR; INTRAVENOUS; SUBCUTANEOUS ONCE
Qty: 0 | Refills: 0 | Status: COMPLETED | OUTPATIENT
Start: 2018-07-31 | End: 2018-07-31

## 2018-07-31 RX ORDER — ACETAMINOPHEN 500 MG
1000 TABLET ORAL ONCE
Qty: 0 | Refills: 0 | Status: COMPLETED | OUTPATIENT
Start: 2018-07-31 | End: 2018-07-31

## 2018-07-31 RX ORDER — VANCOMYCIN HCL 1 G
1000 VIAL (EA) INTRAVENOUS DAILY
Qty: 0 | Refills: 0 | Status: DISCONTINUED | OUTPATIENT
Start: 2018-07-31 | End: 2018-08-01

## 2018-07-31 RX ORDER — VANCOMYCIN HCL 1 G
1000 VIAL (EA) INTRAVENOUS ONCE
Qty: 0 | Refills: 0 | Status: COMPLETED | OUTPATIENT
Start: 2018-07-31 | End: 2018-07-31

## 2018-07-31 RX ORDER — ACETAMINOPHEN 500 MG
1000 TABLET ORAL ONCE
Qty: 0 | Refills: 0 | Status: DISCONTINUED | OUTPATIENT
Start: 2018-07-31 | End: 2018-07-31

## 2018-07-31 RX ADMIN — PIPERACILLIN AND TAZOBACTAM 25 GRAM(S): 4; .5 INJECTION, POWDER, LYOPHILIZED, FOR SOLUTION INTRAVENOUS at 21:47

## 2018-07-31 RX ADMIN — Medication 400 MILLIGRAM(S): at 04:25

## 2018-07-31 RX ADMIN — PIPERACILLIN AND TAZOBACTAM 200 GRAM(S): 4; .5 INJECTION, POWDER, LYOPHILIZED, FOR SOLUTION INTRAVENOUS at 03:05

## 2018-07-31 RX ADMIN — PIPERACILLIN AND TAZOBACTAM 25 GRAM(S): 4; .5 INJECTION, POWDER, LYOPHILIZED, FOR SOLUTION INTRAVENOUS at 13:31

## 2018-07-31 RX ADMIN — PIPERACILLIN AND TAZOBACTAM 3.38 GRAM(S): 4; .5 INJECTION, POWDER, LYOPHILIZED, FOR SOLUTION INTRAVENOUS at 03:40

## 2018-07-31 RX ADMIN — SODIUM CHLORIDE 1000 MILLILITER(S): 9 INJECTION INTRAMUSCULAR; INTRAVENOUS; SUBCUTANEOUS at 01:18

## 2018-07-31 RX ADMIN — Medication 250 MILLIGRAM(S): at 04:03

## 2018-07-31 NOTE — H&P ADULT - NSHPPHYSICALEXAM_GEN_ALL_CORE
Vital Signs Last 24 Hrs  T(C): 36.8 (07-31-18 @ 04:13), Max: 38.1 (07-31-18 @ 01:05)  T(F): 98.3 (07-31-18 @ 04:13), Max: 100.6 (07-31-18 @ 01:05)  HR: 77 (07-31-18 @ 04:13) (70 - 77)  BP: 145/77 (07-31-18 @ 04:13) (138/78 - 145/77)  RR: 16 (07-31-18 @ 04:13) (16 - 16)  SpO2: 96% (07-31-18 @ 04:13) (96% - 96%)    PHYSICAL EXAM:  GENERAL: NAD, well-groomed, well-developed  HEAD:  Atraumatic, Normocephalic  EYES: EOMI, PERRLA, conjunctiva and sclera clear  ENMT: No tonsillar erythema, exudates, or enlargement; Moist mucous membranes, Good dentition, No lesions  NECK: Supple, No JVD, Normal thyroid  CHEST/LUNG: Clear to percussion bilaterally; No rales, rhonchi, wheezing, or rubs  HEART: Regular rate and rhythm; No murmurs, rubs, or gallops  ABDOMEN: Soft, Nontender, Nondistended; Bowel sounds present  EXTREMITIES:  2+ Peripheral Pulses, No clubbing, cyanosis, or edema  LYMPH: No lymphadenopathy noted  SKIN: No rashes or lesions  NERVOUS SYSTEM:  Alert & Oriented X3, Good concentration; Motor Strength 5/5 B/L upper and lower extremities; DTRs 2+ intact and symmetric Vital Signs Last 24 Hrs  T(C): 36.8 (07-31-18 @ 04:13), Max: 38.1 (07-31-18 @ 01:05)  T(F): 98.3 (07-31-18 @ 04:13), Max: 100.6 (07-31-18 @ 01:05)  HR: 77 (07-31-18 @ 04:13) (70 - 77)  BP: 145/77 (07-31-18 @ 04:13) (138/78 - 145/77)  RR: 16 (07-31-18 @ 04:13) (16 - 16)  SpO2: 96% (07-31-18 @ 04:13) (96% - 96%)    PHYSICAL EXAM:  GENERAL: NAD  HEAD:  Atraumatic, Normocephalic  EYES:  conjunctiva and sclera clear  ENMT: No tonsillar erythema, exudates, or enlargement; Moist mucous membranes, Good dentition, No lesions  NECK: Supple, No JVD, Normal thyroid  CHEST/LUNG: Clear to percussion bilaterally; No rales, rhonchi, wheezing, or rubs  HEART: irregular; No murmurs, rubs, or gallops  ABDOMEN: Soft, Nontender, Nondistended; Bowel sounds present  EXTREMITIES:  2+ Peripheral Pulses, No clubbing, cyanosis, or edema  LYMPH: No lymphadenopathy noted  SKIN: No rashes or lesions  NERVOUS SYSTEM:  Alert & Oriented X3, Good concentration; Motor Strength 5/5 B/L upper and lower extremities; DTRs 2+ intact and symmetric

## 2018-07-31 NOTE — H&P ADULT - PROBLEM SELECTOR PLAN 6
Pt takes Atorvastatin 40 at home  - hold Atorvastatin given that pt has no mental status to safely take PO meds

## 2018-07-31 NOTE — H&P ADULT - PROBLEM SELECTOR PLAN 4
Pt has hx of AFIB, not on any AC, and had a syncopal episode several years back; has a PPM; CHADS VASc is 4; pt should be anticoagulated   - would hold off on AC at this time in the event there is an intracranial bleed  - would have discussion w/ family about anticoagulation Pt has hx of AFIB, not on any AC, and had a syncopal episode several years back; has a PPM; CHADS VASc is 4; pt has not been on AC for 10-15 years as her doctors monitored her as an outpatient and didn't feel it was necessary; however, w/ CHADs VASc of 4, pt should be anticoagulated but must r/o bleed first   - would hold off on AC at this time in the event there is an intracranial bleed  - would have discussion w/ family about anticoagulation; w/ CHADS VASc of 4, pt should be anticoagulated

## 2018-07-31 NOTE — H&P ADULT - PROBLEM SELECTOR PLAN 8
DVT: Heparin SubQ after CTH 2/2 DENISE on CKD  Dispo: floors  Code: full code - however, would benefit from GOC and possibly palliative c/s DVT: Heparin SubQ after CTH 2/2 DENISE on CKD; however, may need to consider anticoagulation given CHADs VASc of 4   Dispo: floors  Code: full code - however, would benefit from GOC and possibly palliative c/s DVT: Heparin SubQ after CTH 2/2 DENISE on CKD; however, may need to consider anticoagulation given CHADs VASc of 4   Dispo: floors  Code: full code - however, would benefit from GOC and possibly palliative c/s    Sacral ulcer - wound care consult    PPM maintenance- due for interrogation , unable to do outside hospital , if possible should interrogate while in house

## 2018-07-31 NOTE — H&P ADULT - NSHPLABSRESULTS_GEN_ALL_CORE
137  |  102  |  29<H>  ----------------------------<  132<H>  4.1   |  23  |  1.78<H>    Ca    9.3      2018 01:19    TPro  7.1  /  Alb  3.4  /  TBili  0.9  /  DBili  x   /  AST  32  /  ALT  68<H>  /  AlkPhos  145<H>      PT/INR - ( 2018 01:19 )   PT: 12.8 sec;   INR: 1.17 ratio         PTT - ( 2018 01:19 )  PTT:30.1 sec              Urinalysis Basic - ( 2018 01:18 )    Color: Yellow / Appearance: Clear / S.017 / pH: x  Gluc: x / Ketone: Negative  / Bili: Negative / Urobili: Negative   Blood: x / Protein: 100 mg/dL / Nitrite: Negative   Leuk Esterase: Negative / RBC: 0-2 /HPF / WBC 0-2 /HPF   Sq Epi: x / Non Sq Epi: OCC /HPF / Bacteria: Few /HPF               11.8   15.4  )-----------( 205      ( 2018 01:19 )             35.1                         11.7   9.7   )-----------( 191      ( 2018 06:24 )             37.2                         CAPILLARY BLOOD GLUCOSE     137  |  102  |  29<H>  ----------------------------<  132<H>  4.1   |  23  |  1.78<H>    Ca    9.3      2018 01:19    TPro  7.1  /  Alb  3.4  /  TBili  0.9  /  DBili  x   /  AST  32  /  ALT  68<H>  /  AlkPhos  145<H>      PT/INR - ( 2018 01:19 )   PT: 12.8 sec;   INR: 1.17 ratio    PTT - ( 2018 01:19 )  PTT:30.1 sec                Urinalysis Basic - ( 2018 01:18 )  Color: Yellow / Appearance: Clear / S.017 / pH: x  Gluc: x / Ketone: Negative  / Bili: Negative / Urobili: Negative   Blood: x / Protein: 100 mg/dL / Nitrite: Negative   Leuk Esterase: Negative / RBC: 0-2 /HPF / WBC 0-2 /HPF   Sq Epi: x / Non Sq Epi: OCC /HPF / Bacteria: Few /HPF               11.8   15.4  )-----------( 205      ( 2018 01:19 )             35.1                         11.7   9.7   )-----------( 191      ( 2018 06:24 )             37.2                         CAPILLARY BLOOD GLUCOSE

## 2018-07-31 NOTE — H&P ADULT - PROBLEM SELECTOR PLAN 3
Pt has CAD, Pt has CAD, no recent lipid profile so unable to calculate ASCVD, but likely should be on ASA 81  - will have to hold Atorvastatin 40 given that pt has no mental status to safely take PO; can discuss w/ family about placing an NGT

## 2018-07-31 NOTE — ED PROVIDER NOTE - ATTENDING CONTRIBUTION TO CARE
89 yo female recent admission, discharged yesterday, for acute laxmi treated medically and UTI, presents with home caretaker for increased confusion and activity x 1 day. Caretaker denies coughing, vomiting, fevers, chills, diarrhea. PE: Ill appearing, confused, MM dry, neck supple, neg Kernig and Brudzinski signs, lungs clear, ab soft nt/nd. A/P: 89 yo female with recent hospitalization for acute laxmi treated medically, discharged on flagyl and and keflex presents with acute delirium, found to be febrile here. Concern for infectious cause including worsening UTI, acute laxmi, PNA or viral process. Meningitis unlikely given lack of meningeal signs on exam. Will obtain labs, cxr, ct ab/pel, rvp, cultures, repeat UA/culture, empiric broad spectrum abx, readmission.

## 2018-07-31 NOTE — H&P ADULT - PROBLEM SELECTOR PLAN 2
DDx of encephalopathy is broad, including septic, hepatic, uremic, and metabolic causes such as hyponatremia, hypoglycemia, and other electrolyte imbalances, as well as aberrant intracerebral processes such as meningitis, encephalitis, non-convulsive status, and stroke.  In this pt, concern is for a septic encephalopathy even though she does not technically meet sepsis criteria as there is no clear source.  Hepatic and uremic encephalopathy are unlikely given mild elevation in AST/ALT and BUN, respectively.  Pt has no significant electrolyte derangements which would explain the clinical picture.  Meningitis, encephalitis, intracranial processes, stroke, and non-convulsive status are all possible.    - f/u CTH to r/o intracranial bleed and mass effect  - would get MRI/MRA to visualize brain parenchyma and look for subtle changes not visible on non-con CTH  - would consider LP to examine CSF for different pathogens - concern would be more for viral than bacterial, but unclear at this point   - would get TTE to r/o PFO - pt may have thrown a clot and had embolic stroke causing sudden change in mental status  - would get vEEG for 24 hours to r/o epileptiform activity and non-convulsive status  - other tests that can be considered are EMELYN, ANCA, RF, ESR, procalcitonin, B12, folate, and RPR as these may point us in the direction of an autoimmune, inflammatory, or nutrient deficiency, D-dimer Pt has leukocytosis and fever, no clear source at this time; RVP negative; CT abdomen/pelvis showing no signs of acute cholecystitis; other source is pulmonary in the setting of aspiration pneumonia vs pneumonitis, intracerebral such as meningitis or encephalitis vs neoplastic process  - f/u BCx and UCx  - f/u CTH  - would empirically starting Zosyn as pt was previously taking Metronidazole and Cephalexin; may have only partially treated the UTI when switching to Flagyl and Cephalexin  - would consider CT chest if no response to Zosyn  - would also consider LP w/ serology/gram stain/PCR if no response to Zosyn

## 2018-07-31 NOTE — H&P ADULT - PROBLEM SELECTOR PLAN 1
Pt has leukocytosis and fever, no clear source at this time; RVP negative; CT abdomen/pelvis showing no signs of acute cholecystitis; other source is pulmonary in the setting of aspiration pneumonia vs pneumonitis, intracerebral such as meningitis or encephalitis  - f/u BCx and UCx  - would empirically startinng Zosyn as pt was previously taking Metronidazole and Cephalexin Pt has leukocytosis and fever, no clear source at this time; RVP negative; CT abdomen/pelvis showing no signs of acute cholecystitis; other source is pulmonary in the setting of aspiration pneumonia vs pneumonitis, intracerebral such as meningitis or encephalitis vs neoplastic process  - f/u BCx and UCx  - f/u CTH  - would empirically starting Zosyn as pt was previously taking Metronidazole and Cephalexin  - would consider CT chest - unable to give IV contrast w/ DENISE  - would also consider LP w/ serology/gram stain/PCR DDx of encephalopathy is broad, including septic, hepatic, uremic, and metabolic causes such as hyponatremia, hypoglycemia, and other electrolyte imbalances, as well as aberrant intracerebral processes such as meningitis, encephalitis, non-convulsive status, and stroke.  In this pt, concern is for a septic encephalopathy even though she does not technically meet sepsis criteria as there is no clear source.  Hepatic and uremic encephalopathy are unlikely given mild elevation in AST/ALT and BUN, respectively.  Pt has no significant electrolyte derangements which would explain the clinical picture.  Meningitis, encephalitis, intracranial processes, stroke, and non-convulsive status are all possible.    - f/u CTH to r/o intracranial bleed and mass effect  - would get MRI/MRA to visualize brain parenchyma and look for subtle changes not visible on non-con CTH  - would consider LP to examine CSF if she doesn't improve with Zosyn for different pathogens - concern would be more for viral than bacterial, but unclear at this point   - would consider CT chest for aspiration pneumonia vs pneumonitis but lower probability - only if she does not improve w/ Zosyn  - would get TTE to r/o PFO - pt may have thrown a clot and had embolic stroke causing sudden change in mental status or may have subacute bacterial endocarditis w/ apparent vegetation   - can consider vEEG for 24 hours to r/o epileptiform activity and non-convulsive status if no improvement w/ Zosyn  - other tests that can be considered are EMELYN, ANCA, RF, ESR, procalcitonin, B12, folate, and RPR as these may point us in the direction of an autoimmune, inflammatory, or nutrient deficiency - these, however, can be considered if there is no response to Zosyn

## 2018-07-31 NOTE — PATIENT PROFILE ADULT. - VISION (WITH CORRECTIVE LENSES IF THE PATIENT USUALLY WEARS THEM):
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
None known

## 2018-07-31 NOTE — PROGRESS NOTE ADULT - PROBLEM SELECTOR PLAN 1
DDx of encephalopathy is broad, including septic, hepatic, uremic, and metabolic causes such as hyponatremia, hypoglycemia, and other electrolyte imbalances, as well as aberrant intracerebral processes such as meningitis, encephalitis, non-convulsive status, and stroke.  In this pt, concern is for a septic encephalopathy even though she does not technically meet sepsis criteria as there is no clear source.  Hepatic and uremic encephalopathy are unlikely given mild elevation in AST/ALT and BUN, respectively.  Pt has no significant electrolyte derangements which would explain the clinical picture.  Meningitis, encephalitis, intracranial processes, stroke, and non-convulsive status are all possible.    - CTH negative for mass effect or acute bleed  - would get MRI/MRA to visualize brain parenchyma and look for subtle changes not visible on non-contrast CTH  - would consider LP to examine CSF if she doesn't improve with Zosyn for different pathogens - concern would be more for viral than bacterial, but unclear at this point   - would consider CT chest for aspiration pneumonia vs pneumonitis but lower probability - only if she does not improve w/ Zosyn  - would get TTE to r/o PFO - pt may have thrown a clot and had embolic stroke causing sudden change in mental status or may have subacute bacterial endocarditis w/ apparent vegetation   - can consider vEEG for 24 hours to r/o epileptiform activity and non-convulsive status if no improvement w/ Zosyn  - other tests that can be considered are EMELYN, ANCA, RF, ESR, procalcitonin, B12, folate, and RPR as these may point us in the direction of an autoimmune, inflammatory, or nutrient deficiency - these, however, can be considered if there is no response to Zosyn

## 2018-07-31 NOTE — H&P ADULT - PROBLEM SELECTOR PLAN 5
Pt takes Amlodipine 5 at home  - monitor BP  - hold Amlodipine 5 at this time given that pt has no mental status to safely take PO meds Pt takes Amlodipine 5 at home  - monitor BP  - hold Amlodipine 5 at this time given that pt has no mental status to safely take PO meds; can discuss w/ HCP about putting in NGT to give meds

## 2018-07-31 NOTE — H&P ADULT - NSHPSOCIALHISTORY_GEN_ALL_CORE
Marital Status:  (   )    (   ) Single    (   )    (x  )   Occupation: retired  Lives with: son and 24 hr HHA    Substance Use (street drugs): (x  ) never used  (  ) other:  Tobacco Usage:  (  x ) never smoked   (   ) former smoker   (   ) current smoker  (     ) pack year  (        ) last cigarette date  Alcohol Usage: none  Sexual History: none

## 2018-07-31 NOTE — H&P ADULT - HISTORY OF PRESENT ILLNESS
89 wheel chair/bed bound F w/ dementia, (baseline per prior documentation is AOx1-2 at best), AFIB (not on AC), PPM, HTN, HLD, recent admission and discharged yesterday for gall stone pancreatitis and UTI now p/w encephalopathy and vomiting.  Pt was d/c'd on keflex and flagyl, had  possible acute cholecystitis but deemed not surgical candidate. Per son, when pt left the hospital yesterday, she was still not at her baseline and was talking about people who were dead which is not typical for her. On examination, she is no longer talking, so difficult to complain of anything.  Pt is also not vomiting anymore.      On previous admission, pt presented several days ago with N/V, BIBEMS w/ son and 24 hour HHA.  Since the morning of admission, pt was nauseous w/ multiple episodes of emesis - quality of emesis unclear.  Pt also had cloudy and foul smell urine.  Pt also endorsed worsening weakness, remains in bed or a wheelchair, but will periodically ambulate with assistance.  She is a pt of Dr. Alegria and was last seen one month ago in June 2018.  Since the CT A/P and abdominal US were not concerning for acute laxmi, and given the lack of abdominal pain, surgery decided to conservatively manage her given that she is also a poor surgical candidate.  She was treated w/ Zosyn for Gallstone pancreatitis, but no further intervention was made on that; pt was eventually transitioned to Keflex and Flagyl.  Pt's transaminitis resolved and she was afebrile w/o n/v or abdominal pain.    In the ED:  Vital Signs Last 24 Hrs  T(C): 36.8 (07-31-18 @ 04:13), Max: 38.1 (07-31-18 @ 01:05)  T(F): 98.3 (07-31-18 @ 04:13), Max: 100.6 (07-31-18 @ 01:05)  HR: 77 (07-31-18 @ 04:13) (70 - 77)  BP: 145/77 (07-31-18 @ 04:13) (138/78 - 145/77)  RR: 16 (07-31-18 @ 04:13) (16 - 16)  SpO2: 96% (07-31-18 @ 04:13) (96% - 96%)    Pt received 1 L NS, Vanc and Zosyn x1, got IV Tylenol     CT A/P: No CT evidence of acute cholecystitis.  Unchanged ectasia of the distal aorta and right common iliac artery.  Redemonstration of indeterminate left adnexal cystic lesion. 89 wheel chair/bed bound F w/ dementia, (baseline per prior documentation is AOx1-2 at best), AFIB (not on AC), PPM, HTN, HLD, recent admission and discharged yesterday for gall stone pancreatitis and UTI now p/w encephalopathy and vomiting.  Pt was d/c'd on keflex and flagyl, had  possible acute cholecystitis but deemed not surgical candidate. Per son, when pt left the hospital yesterday, she was still not at her baseline and was talking about people who were dead which is not typical for her. On examination, she is no longer talking, so difficult to complain of anything.  Pt is also not vomiting anymore.  Per her son, when she got home, aside from talking about dead people, she had a good appetite, but did not sleep well.  When she woke up, she took her medications in the morning, but did not take her medications in the afternoon, after which her son felt that she should come into the hospital.  She was like this once before when she had a UTI.      On previous admission, pt presented several days ago with N/V, BIBEMS w/ son and 24 hour HHA.  Since the morning of admission, pt was nauseous w/ multiple episodes of emesis - quality of emesis unclear.  Pt also had cloudy and foul smell urine.  Pt also endorsed worsening weakness, remains in bed or a wheelchair, but will periodically ambulate with assistance.  She is a pt of Dr. Alegria and was last seen one month ago in June 2018.  Since the CT A/P and abdominal US were not concerning for acute laxmi, and given the lack of abdominal pain, surgery decided to conservatively manage her given that she is also a poor surgical candidate.  She was treated w/ Zosyn for Gallstone pancreatitis, but no further intervention was made on that; pt was eventually transitioned to Keflex and Flagyl.  Pt's transaminitis resolved and she was afebrile w/o n/v or abdominal pain.    In the ED:  Vital Signs Last 24 Hrs  T(C): 36.8 (07-31-18 @ 04:13), Max: 38.1 (07-31-18 @ 01:05)  T(F): 98.3 (07-31-18 @ 04:13), Max: 100.6 (07-31-18 @ 01:05)  HR: 77 (07-31-18 @ 04:13) (70 - 77)  BP: 145/77 (07-31-18 @ 04:13) (138/78 - 145/77)  RR: 16 (07-31-18 @ 04:13) (16 - 16)  SpO2: 96% (07-31-18 @ 04:13) (96% - 96%)    Pt received 1 L NS, Vanc and Zosyn x1, got IV Tylenol     CT A/P: No CT evidence of acute cholecystitis.  Unchanged ectasia of the distal aorta and right common iliac artery.  Redemonstration of indeterminate left adnexal cystic lesion.

## 2018-07-31 NOTE — ED PROVIDER NOTE - OBJECTIVE STATEMENT
89 F h /o dementia, (baseline per prior documentation is AOx1), PPM, HTN, HLD, recent admission and discharged yesterday for gall stone pancreatitis and UTI, d/c'd on keflex and flagyl, had e/o possible acute laxmi but deemed not surgical candidate, p/w AMS from home. Per son when she left the hospital yesterday she was still not hat her baseline and was talking about people who were dead which is not typical for her. Today she is not talking at all, not eating. Has not complained of anything but not talking. No further episodes of vomiting.

## 2018-07-31 NOTE — PROGRESS NOTE ADULT - PROBLEM SELECTOR PLAN 8
DVT: Heparin SubQ after CTH 2/2 DENISE on CKD; however, may need to consider anticoagulation given CHADs VASc of 4   Dispo: floors  Code: full code - however, would benefit from GOC and possibly palliative c/s    Sacral ulcer - wound care consult    PPM maintenance- due for interrogation , unable to do outside hospital , if possible should interrogate while in house

## 2018-07-31 NOTE — H&P ADULT - ASSESSMENT
89 wheel chair/bed bound F w/ dementia, (baseline per prior documentation is AOx1-2 at best), AFIB (not on AC), PPM, HTN, HLD, recent admission and discharged yesterday for gall stone pancreatitis and UTI now p/w encephalopathy and vomiting of unclear etiology 89 wheel chair/bed bound F w/ dementia, (baseline per prior documentation is AOx1-2 at best), AFIB (not on AC), PPM, HTN, HLD, recent admission and discharged yesterday for gall stone pancreatitis and UTI now p/w encephalopathy and vomiting

## 2018-07-31 NOTE — ED ADULT NURSE REASSESSMENT NOTE - NS ED NURSE REASSESS COMMENT FT1
Patient A&Ox1, patient able to state her name..  when asked a question patient responds by shaking her head yes or no, but doesn't respond verbally.  VSS.

## 2018-07-31 NOTE — ED PROVIDER NOTE - MEDICAL DECISION MAKING DETAILS
89 F with recent admission for UTI and pancreatitis, p/w AMS. Febrile in ED to 100.6. No obvious source of infection. Will check xray, ua, rvp. Also given recent concern for possible acute laxmi, will perform CT abd

## 2018-07-31 NOTE — H&P ADULT - ATTENDING COMMENTS
89 wheel chair/bed bound F w/ dementia, AFIB (not on AC), PPM, HTN, HLD, recently admitted for gallstone pancreatitis managed conservatively ,  also treated for Ecoli UTI , discharged on day prior to admission , now p/w encephalopathy , minimally responsive , grimaces to touch , febrile to 100.6 , labs with leukocytosis , UA w/ amall bacteria neg nitrites and LE , CT abd w/o pancreatitis, CXR clear, will treat with broad spectrum ABX for suspected incompletely treated UTI , f/u Cultures, if no improvement will discuss further w/u including echocardiogram, will obtain wound care consult for sacral ulcer which can be a source , would also interrogate PPM

## 2018-08-01 LAB
-  COAGULASE NEGATIVE STAPHYLOCOCCUS: SIGNIFICANT CHANGE UP
ALBUMIN SERPL ELPH-MCNC: 2.8 G/DL — LOW (ref 3.3–5)
ALP SERPL-CCNC: 108 U/L — SIGNIFICANT CHANGE UP (ref 40–120)
ALT FLD-CCNC: 41 U/L — SIGNIFICANT CHANGE UP (ref 10–45)
ANION GAP SERPL CALC-SCNC: 12 MMOL/L — SIGNIFICANT CHANGE UP (ref 5–17)
AST SERPL-CCNC: 20 U/L — SIGNIFICANT CHANGE UP (ref 10–40)
BASOPHILS # BLD AUTO: 0.02 K/UL — SIGNIFICANT CHANGE UP (ref 0–0.2)
BASOPHILS NFR BLD AUTO: 0.2 % — SIGNIFICANT CHANGE UP (ref 0–2)
BILIRUB SERPL-MCNC: 0.6 MG/DL — SIGNIFICANT CHANGE UP (ref 0.2–1.2)
BUN SERPL-MCNC: 25 MG/DL — HIGH (ref 7–23)
CALCIUM SERPL-MCNC: 9 MG/DL — SIGNIFICANT CHANGE UP (ref 8.4–10.5)
CHLORIDE SERPL-SCNC: 107 MMOL/L — SIGNIFICANT CHANGE UP (ref 96–108)
CO2 SERPL-SCNC: 22 MMOL/L — SIGNIFICANT CHANGE UP (ref 22–31)
CREAT SERPL-MCNC: 1.6 MG/DL — HIGH (ref 0.5–1.3)
EOSINOPHIL # BLD AUTO: 0.26 K/UL — SIGNIFICANT CHANGE UP (ref 0–0.5)
EOSINOPHIL NFR BLD AUTO: 2.6 % — SIGNIFICANT CHANGE UP (ref 0–6)
GLUCOSE SERPL-MCNC: 85 MG/DL — SIGNIFICANT CHANGE UP (ref 70–99)
GRAM STN FLD: SIGNIFICANT CHANGE UP
HCT VFR BLD CALC: 34.3 % — LOW (ref 34.5–45)
HGB BLD-MCNC: 11.3 G/DL — LOW (ref 11.5–15.5)
IMM GRANULOCYTES NFR BLD AUTO: 0.4 % — SIGNIFICANT CHANGE UP (ref 0–1.5)
LYMPHOCYTES # BLD AUTO: 2.13 K/UL — SIGNIFICANT CHANGE UP (ref 1–3.3)
LYMPHOCYTES # BLD AUTO: 21.3 % — SIGNIFICANT CHANGE UP (ref 13–44)
MAGNESIUM SERPL-MCNC: 1.9 MG/DL — SIGNIFICANT CHANGE UP (ref 1.6–2.6)
MCHC RBC-ENTMCNC: 30.5 PG — SIGNIFICANT CHANGE UP (ref 27–34)
MCHC RBC-ENTMCNC: 32.9 GM/DL — SIGNIFICANT CHANGE UP (ref 32–36)
MCV RBC AUTO: 92.5 FL — SIGNIFICANT CHANGE UP (ref 80–100)
METHOD TYPE: SIGNIFICANT CHANGE UP
MONOCYTES # BLD AUTO: 0.91 K/UL — HIGH (ref 0–0.9)
MONOCYTES NFR BLD AUTO: 9.1 % — SIGNIFICANT CHANGE UP (ref 2–14)
NEUTROPHILS # BLD AUTO: 6.66 K/UL — SIGNIFICANT CHANGE UP (ref 1.8–7.4)
NEUTROPHILS NFR BLD AUTO: 66.4 % — SIGNIFICANT CHANGE UP (ref 43–77)
PHOSPHATE SERPL-MCNC: 3.6 MG/DL — SIGNIFICANT CHANGE UP (ref 2.5–4.5)
PLATELET # BLD AUTO: 203 K/UL — SIGNIFICANT CHANGE UP (ref 150–400)
POTASSIUM SERPL-MCNC: 4.1 MMOL/L — SIGNIFICANT CHANGE UP (ref 3.5–5.3)
POTASSIUM SERPL-SCNC: 4.1 MMOL/L — SIGNIFICANT CHANGE UP (ref 3.5–5.3)
PROT SERPL-MCNC: 6.2 G/DL — SIGNIFICANT CHANGE UP (ref 6–8.3)
RBC # BLD: 3.71 M/UL — LOW (ref 3.8–5.2)
RBC # FLD: 14.1 % — SIGNIFICANT CHANGE UP (ref 10.3–14.5)
SODIUM SERPL-SCNC: 141 MMOL/L — SIGNIFICANT CHANGE UP (ref 135–145)
WBC # BLD: 10.02 K/UL — SIGNIFICANT CHANGE UP (ref 3.8–10.5)
WBC # FLD AUTO: 10.02 K/UL — SIGNIFICANT CHANGE UP (ref 3.8–10.5)

## 2018-08-01 PROCEDURE — 99233 SBSQ HOSP IP/OBS HIGH 50: CPT | Mod: GC

## 2018-08-01 RX ORDER — HEPARIN SODIUM 5000 [USP'U]/ML
5000 INJECTION INTRAVENOUS; SUBCUTANEOUS EVERY 8 HOURS
Qty: 0 | Refills: 0 | Status: DISCONTINUED | OUTPATIENT
Start: 2018-08-01 | End: 2018-08-04

## 2018-08-01 RX ADMIN — HEPARIN SODIUM 5000 UNIT(S): 5000 INJECTION INTRAVENOUS; SUBCUTANEOUS at 14:31

## 2018-08-01 RX ADMIN — PIPERACILLIN AND TAZOBACTAM 25 GRAM(S): 4; .5 INJECTION, POWDER, LYOPHILIZED, FOR SOLUTION INTRAVENOUS at 05:05

## 2018-08-01 RX ADMIN — HEPARIN SODIUM 5000 UNIT(S): 5000 INJECTION INTRAVENOUS; SUBCUTANEOUS at 21:20

## 2018-08-01 RX ADMIN — PIPERACILLIN AND TAZOBACTAM 25 GRAM(S): 4; .5 INJECTION, POWDER, LYOPHILIZED, FOR SOLUTION INTRAVENOUS at 14:30

## 2018-08-01 RX ADMIN — PIPERACILLIN AND TAZOBACTAM 25 GRAM(S): 4; .5 INJECTION, POWDER, LYOPHILIZED, FOR SOLUTION INTRAVENOUS at 21:20

## 2018-08-01 RX ADMIN — Medication 250 MILLIGRAM(S): at 08:35

## 2018-08-01 NOTE — PROGRESS NOTE ADULT - PROBLEM SELECTOR PLAN 1
Encephalopathy 2/2 infection vs possible meningitis although pt does not endorse neck pain or meningeal signs. CT chest negative for PNA.  -LP to examine CSF Encephalopathy 2/2 infection vs possible meningitis although pt does not endorse neck pain or meningeal signs. CT chest negative for PNA

## 2018-08-01 NOTE — PROGRESS NOTE ADULT - PROBLEM SELECTOR PLAN 8
DVT: Heparin SubQ   Dispo: floors  Code: full code - however, would benefit from GOC and possibly palliative c/s    Sacral ulcer - wound care consult    PPM maintenance- due for interrogation , unable to do outside hospital , if possible should interrogate while in house

## 2018-08-02 ENCOUNTER — TRANSCRIPTION ENCOUNTER (OUTPATIENT)
Age: 83
End: 2018-08-02

## 2018-08-02 LAB
ANION GAP SERPL CALC-SCNC: 11 MMOL/L — SIGNIFICANT CHANGE UP (ref 5–17)
BASOPHILS # BLD AUTO: 0.02 K/UL — SIGNIFICANT CHANGE UP (ref 0–0.2)
BASOPHILS NFR BLD AUTO: 0.2 % — SIGNIFICANT CHANGE UP (ref 0–2)
BUN SERPL-MCNC: 34 MG/DL — HIGH (ref 7–23)
CALCIUM SERPL-MCNC: 8.7 MG/DL — SIGNIFICANT CHANGE UP (ref 8.4–10.5)
CHLORIDE SERPL-SCNC: 105 MMOL/L — SIGNIFICANT CHANGE UP (ref 96–108)
CO2 SERPL-SCNC: 23 MMOL/L — SIGNIFICANT CHANGE UP (ref 22–31)
CREAT SERPL-MCNC: 1.76 MG/DL — HIGH (ref 0.5–1.3)
CULTURE RESULTS: SIGNIFICANT CHANGE UP
EOSINOPHIL # BLD AUTO: 0.21 K/UL — SIGNIFICANT CHANGE UP (ref 0–0.5)
EOSINOPHIL NFR BLD AUTO: 2.1 % — SIGNIFICANT CHANGE UP (ref 0–6)
GLUCOSE SERPL-MCNC: 152 MG/DL — HIGH (ref 70–99)
HCT VFR BLD CALC: 33.1 % — LOW (ref 34.5–45)
HGB BLD-MCNC: 10.7 G/DL — LOW (ref 11.5–15.5)
IMM GRANULOCYTES NFR BLD AUTO: 0.3 % — SIGNIFICANT CHANGE UP (ref 0–1.5)
LYMPHOCYTES # BLD AUTO: 2.58 K/UL — SIGNIFICANT CHANGE UP (ref 1–3.3)
LYMPHOCYTES # BLD AUTO: 25.4 % — SIGNIFICANT CHANGE UP (ref 13–44)
MAGNESIUM SERPL-MCNC: 1.9 MG/DL — SIGNIFICANT CHANGE UP (ref 1.6–2.6)
MCHC RBC-ENTMCNC: 29.4 PG — SIGNIFICANT CHANGE UP (ref 27–34)
MCHC RBC-ENTMCNC: 32.3 GM/DL — SIGNIFICANT CHANGE UP (ref 32–36)
MCV RBC AUTO: 90.9 FL — SIGNIFICANT CHANGE UP (ref 80–100)
MONOCYTES # BLD AUTO: 0.87 K/UL — SIGNIFICANT CHANGE UP (ref 0–0.9)
MONOCYTES NFR BLD AUTO: 8.6 % — SIGNIFICANT CHANGE UP (ref 2–14)
NEUTROPHILS # BLD AUTO: 6.43 K/UL — SIGNIFICANT CHANGE UP (ref 1.8–7.4)
NEUTROPHILS NFR BLD AUTO: 63.4 % — SIGNIFICANT CHANGE UP (ref 43–77)
ORGANISM # SPEC MICROSCOPIC CNT: SIGNIFICANT CHANGE UP
ORGANISM # SPEC MICROSCOPIC CNT: SIGNIFICANT CHANGE UP
PHOSPHATE SERPL-MCNC: 3.6 MG/DL — SIGNIFICANT CHANGE UP (ref 2.5–4.5)
PLATELET # BLD AUTO: 235 K/UL — SIGNIFICANT CHANGE UP (ref 150–400)
POTASSIUM SERPL-MCNC: 4.2 MMOL/L — SIGNIFICANT CHANGE UP (ref 3.5–5.3)
POTASSIUM SERPL-SCNC: 4.2 MMOL/L — SIGNIFICANT CHANGE UP (ref 3.5–5.3)
RBC # BLD: 3.64 M/UL — LOW (ref 3.8–5.2)
RBC # FLD: 14.1 % — SIGNIFICANT CHANGE UP (ref 10.3–14.5)
SODIUM SERPL-SCNC: 139 MMOL/L — SIGNIFICANT CHANGE UP (ref 135–145)
SPECIMEN SOURCE: SIGNIFICANT CHANGE UP
WBC # BLD: 10.14 K/UL — SIGNIFICANT CHANGE UP (ref 3.8–10.5)
WBC # FLD AUTO: 10.14 K/UL — SIGNIFICANT CHANGE UP (ref 3.8–10.5)

## 2018-08-02 PROCEDURE — 99233 SBSQ HOSP IP/OBS HIGH 50: CPT | Mod: GC

## 2018-08-02 RX ADMIN — HEPARIN SODIUM 5000 UNIT(S): 5000 INJECTION INTRAVENOUS; SUBCUTANEOUS at 13:58

## 2018-08-02 RX ADMIN — HEPARIN SODIUM 5000 UNIT(S): 5000 INJECTION INTRAVENOUS; SUBCUTANEOUS at 21:34

## 2018-08-02 RX ADMIN — PIPERACILLIN AND TAZOBACTAM 25 GRAM(S): 4; .5 INJECTION, POWDER, LYOPHILIZED, FOR SOLUTION INTRAVENOUS at 13:58

## 2018-08-02 RX ADMIN — HEPARIN SODIUM 5000 UNIT(S): 5000 INJECTION INTRAVENOUS; SUBCUTANEOUS at 05:09

## 2018-08-02 RX ADMIN — PIPERACILLIN AND TAZOBACTAM 25 GRAM(S): 4; .5 INJECTION, POWDER, LYOPHILIZED, FOR SOLUTION INTRAVENOUS at 05:09

## 2018-08-02 RX ADMIN — PIPERACILLIN AND TAZOBACTAM 25 GRAM(S): 4; .5 INJECTION, POWDER, LYOPHILIZED, FOR SOLUTION INTRAVENOUS at 21:33

## 2018-08-02 NOTE — DISCHARGE NOTE ADULT - VISION (WITH CORRECTIVE LENSES IF THE PATIENT USUALLY WEARS THEM):
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

## 2018-08-02 NOTE — PHYSICAL THERAPY INITIAL EVALUATION ADULT - BALANCE TRAINING, PT EVAL
Goal: Pt will improve balance to fair with RW to improve performance and safety of standing activities, transfers and ambulation in 4 weeks.

## 2018-08-02 NOTE — DISCHARGE NOTE ADULT - COMMUNITY RESOURCES
[facility]: [place]: [phone]. Aide services to be resumed at 1pm on Saturday, 18.

## 2018-08-02 NOTE — PROCEDURE NOTE - ADDITIONAL PROCEDURE DETAILS
1. Measured data WNL  2. Battery longevity 8.5 years  3. Sensing and pacing thresholds WNL  4. One ventricular high rate on 12/23/17 stored for 4 seconds via marker channel  5. Two atrial high rates on 6/26/18 and 8/1/18 also noted for PAFlutter/PAF, duration 32 secs and 8 hrs 51 secs.  6. Pt with hx of PAF  7. Normal pacemaker function

## 2018-08-02 NOTE — DISCHARGE NOTE ADULT - CARE PROVIDER_API CALL
Shannan Paiz), Geriatric Medicine; Internal Medicine  [address] [address]  Phone: [phone]  Fax: [phone]

## 2018-08-02 NOTE — PHYSICAL THERAPY INITIAL EVALUATION ADULT - IMPAIRED TRANSFERS: SIT/STAND, REHAB EVAL
cognition/decreased flexibility/impaired postural control/impaired balance/impaired sensory feedback

## 2018-08-02 NOTE — DISCHARGE NOTE ADULT - MEDICATION SUMMARY - MEDICATIONS TO STOP TAKING
I will STOP taking the medications listed below when I get home from the hospital:    cephalexin 500 mg oral capsule  -- 1 cap(s) by mouth every 12 hours    metroNIDAZOLE 500 mg oral tablet  -- 1 tab(s) by mouth every 8 hours

## 2018-08-02 NOTE — DISCHARGE NOTE ADULT - PLAN OF CARE
Management You were admitted to the hospital because you were disoriented and started vomiting. We think this was due to an infection, likely a urinary infection, and treated you with IV antibiotics. You will need to continue oral antibiotics once you leave the hospital for 7 more days. You had a fever and an elevated white cell count, both markers for inflammation. If you notice you are febrile or develop an elevated heart rate or difficulty breathing, please go to your doctor. You were admitted to the hospital because you were disoriented and started vomiting. We think this was due to an infection, likely a urinary infection, and treated you with IV antibiotics. You will need to continue oral antibiotics once you leave the hospital for 2 more days.

## 2018-08-02 NOTE — DISCHARGE NOTE ADULT - MEDICATION SUMMARY - MEDICATIONS TO TAKE
I will START or STAY ON the medications listed below when I get home from the hospital:    Lipitor 40 mg oral tablet  -- 1 tab(s) by mouth once a day  -- Indication: For hyperlipidemia    amLODIPine 5 mg oral tablet  -- 1 tab(s) by mouth once a day  -- Indication: For hypertension    Cipro 500 mg oral tablet  -- 1 tab(s) by mouth 2 times a day   -- Avoid prolonged or excessive exposure to direct and/or artificial sunlight while taking this medication.  Check with your doctor before becoming pregnant.  Do not take dairy products, antacids, or iron preparations within one hour of this medication.  Finish all this medication unless otherwise directed by prescriber.  Medication should be taken with plenty of water.    -- Indication: For infection

## 2018-08-02 NOTE — DISCHARGE NOTE ADULT - CARE PLAN
Principal Discharge DX:	Altered mental status  Goal:	Management  Assessment and plan of treatment:	You were admitted to the hospital because you were disoriented and started vomiting. We think this was due to an infection, likely a urinary infection, and treated you with IV antibiotics. You will need to continue oral antibiotics once you leave the hospital for 7 more days.  Secondary Diagnosis:	Sepsis  Goal:	Management  Assessment and plan of treatment:	You had a fever and an elevated white cell count, both markers for inflammation. If you notice you are febrile or develop an elevated heart rate or difficulty breathing, please go to your doctor. Principal Discharge DX:	Altered mental status  Goal:	Management  Assessment and plan of treatment:	You were admitted to the hospital because you were disoriented and started vomiting. We think this was due to an infection, likely a urinary infection, and treated you with IV antibiotics. You will need to continue oral antibiotics once you leave the hospital for 2 more days.  Secondary Diagnosis:	Sepsis  Goal:	Management  Assessment and plan of treatment:	You had a fever and an elevated white cell count, both markers for inflammation. If you notice you are febrile or develop an elevated heart rate or difficulty breathing, please go to your doctor.

## 2018-08-02 NOTE — PHYSICAL THERAPY INITIAL EVALUATION ADULT - PERTINENT HX OF CURRENT PROBLEM, REHAB EVAL
89 wheel chair/bed bound F w/ dementia, (baseline per prior documentation is AOx1-2 at best), AFIB (not on AC), PPM, HTN, HLD, recent admission for gall stone pancreatitis and UTI now p/w encephalopathy and vomiting likely secondary to an infection. She is improving and more alert. 89F w/ dementia, (baseline per prior documentation is AOx1-2 at best), AFIB (not on AC), PPM, HTN, HLD, recent admission for gall stone pancreatitis and UTI now p/w encephalopathy and vomiting likely secondary to an infection. She is improving and more alert.

## 2018-08-02 NOTE — DISCHARGE NOTE ADULT - HOSPITAL COURSE
89 y/o F wheel chair/bed bound w/dementia (baseline A&Ox1-2) w/PMHx of HTN, a-fib (not an AC), PPM, HLD, recently admitted for gall stone pancreatitis and UTI p/w to this admission with altered mental status and vomiting. She was treated w/ Zosyn for Gallstone pancreatitis in the previous admission, pt was eventually transitioned to Keflex and Flagyl. In the ED, pt was febrile to 100.6 F with a white count to 15. CT A/P showed no evidence of acute cholecystitis and CT head was negative for acute intracranial hemorrhage or mass effect. Chest x-ray was also clear and CT chest was negative for PNA. We treated the pt w/zosyn 3.125g q8hrs and vanc 1g for 1 day. On the 2nd day of admission, the patient improved dramatically and was back to her baseline of A&Ox1-2. Pt is being discharged in stable condition and will need to complete a 7 day course of ciprofloxacin 500 mg qD. 89 y/o F wheel chair/bed bound w/dementia (baseline A&Ox1-2) w/PMHx of HTN, a-fib (not an AC), PPM, HLD, recently admitted for gall stone pancreatitis and UTI p/w to this admission with altered mental status and vomiting. She was treated w/ Zosyn for Gallstone pancreatitis in the previous admission, pt was eventually transitioned to Keflex and Flagyl. In the ED, pt was febrile to 100.6 F with a white count to 15. CT A/P showed no evidence of acute cholecystitis and CT head was negative for acute intracranial hemorrhage or mass effect. Chest x-ray was also clear and CT chest was negative for PNA. Of note, her PPM was interrogated and was largely wnl. We treated the pt w/zosyn 3.125g q8hrs for 5 days and vanc 1g for 1 day. Throughout the hospitalization, the patient improved dramatically and was back to her baseline of A&Ox1-2. Pt is being discharged in stable condition and will need to complete a 2 day course of ciprofloxacin 500 mg qD.

## 2018-08-02 NOTE — DISCHARGE NOTE ADULT - HOME CARE AGENCY
Your home care services has been referred to [facility] ([phone]).  Please call them to inquire about time of Registered Nurse visit.

## 2018-08-02 NOTE — PHYSICAL THERAPY INITIAL EVALUATION ADULT - STRENGTHENING, PT EVAL
Goal: Pt will improve strength to fair t/o extremities to improve performance and safety of standing activities, transfers and ambulation in 4 weeks.

## 2018-08-02 NOTE — DISCHARGE NOTE ADULT - PATIENT PORTAL LINK FT
You can access the [facility] Patient Portal, offered by [facility], by registering with the following website: [email or web address]

## 2018-08-03 DIAGNOSIS — Z95.0 PRESENCE OF CARDIAC PACEMAKER: ICD-10-CM

## 2018-08-03 LAB
ANION GAP SERPL CALC-SCNC: 12 MMOL/L — SIGNIFICANT CHANGE UP (ref 5–17)
BUN SERPL-MCNC: 36 MG/DL — HIGH (ref 7–23)
CALCIUM SERPL-MCNC: 8.7 MG/DL — SIGNIFICANT CHANGE UP (ref 8.4–10.5)
CHLORIDE SERPL-SCNC: 106 MMOL/L — SIGNIFICANT CHANGE UP (ref 96–108)
CO2 SERPL-SCNC: 21 MMOL/L — LOW (ref 22–31)
CREAT SERPL-MCNC: 1.88 MG/DL — HIGH (ref 0.5–1.3)
GLUCOSE SERPL-MCNC: 133 MG/DL — HIGH (ref 70–99)
HCT VFR BLD CALC: 32.5 % — LOW (ref 34.5–45)
HGB BLD-MCNC: 10.7 G/DL — LOW (ref 11.5–15.5)
MAGNESIUM SERPL-MCNC: 1.9 MG/DL — SIGNIFICANT CHANGE UP (ref 1.6–2.6)
MCHC RBC-ENTMCNC: 30.4 PG — SIGNIFICANT CHANGE UP (ref 27–34)
MCHC RBC-ENTMCNC: 32.9 GM/DL — SIGNIFICANT CHANGE UP (ref 32–36)
MCV RBC AUTO: 92.3 FL — SIGNIFICANT CHANGE UP (ref 80–100)
PHOSPHATE SERPL-MCNC: 3.3 MG/DL — SIGNIFICANT CHANGE UP (ref 2.5–4.5)
PLATELET # BLD AUTO: 247 K/UL — SIGNIFICANT CHANGE UP (ref 150–400)
POTASSIUM SERPL-MCNC: 4.4 MMOL/L — SIGNIFICANT CHANGE UP (ref 3.5–5.3)
POTASSIUM SERPL-SCNC: 4.4 MMOL/L — SIGNIFICANT CHANGE UP (ref 3.5–5.3)
RBC # BLD: 3.52 M/UL — LOW (ref 3.8–5.2)
RBC # FLD: 14.1 % — SIGNIFICANT CHANGE UP (ref 10.3–14.5)
SODIUM SERPL-SCNC: 139 MMOL/L — SIGNIFICANT CHANGE UP (ref 135–145)
WBC # BLD: 10.78 K/UL — HIGH (ref 3.8–10.5)
WBC # FLD AUTO: 10.78 K/UL — HIGH (ref 3.8–10.5)

## 2018-08-03 PROCEDURE — 99233 SBSQ HOSP IP/OBS HIGH 50: CPT | Mod: GC

## 2018-08-03 RX ADMIN — HEPARIN SODIUM 5000 UNIT(S): 5000 INJECTION INTRAVENOUS; SUBCUTANEOUS at 05:00

## 2018-08-03 RX ADMIN — PIPERACILLIN AND TAZOBACTAM 25 GRAM(S): 4; .5 INJECTION, POWDER, LYOPHILIZED, FOR SOLUTION INTRAVENOUS at 21:47

## 2018-08-03 RX ADMIN — PIPERACILLIN AND TAZOBACTAM 25 GRAM(S): 4; .5 INJECTION, POWDER, LYOPHILIZED, FOR SOLUTION INTRAVENOUS at 13:33

## 2018-08-03 RX ADMIN — HEPARIN SODIUM 5000 UNIT(S): 5000 INJECTION INTRAVENOUS; SUBCUTANEOUS at 13:33

## 2018-08-03 RX ADMIN — PIPERACILLIN AND TAZOBACTAM 25 GRAM(S): 4; .5 INJECTION, POWDER, LYOPHILIZED, FOR SOLUTION INTRAVENOUS at 05:00

## 2018-08-03 RX ADMIN — HEPARIN SODIUM 5000 UNIT(S): 5000 INJECTION INTRAVENOUS; SUBCUTANEOUS at 21:49

## 2018-08-03 NOTE — PROGRESS NOTE ADULT - PROBLEM SELECTOR PLAN 1
Encephalopathy likely 2/2 infection. Resolving and improving  - BC NGTD  - UC NGTD  - c/w zosyn 3.375 g IV q8hrs (day 4)

## 2018-08-03 NOTE — PROGRESS NOTE ADULT - PROBLEM SELECTOR PLAN 8
DVT: Heparin SubQ   Dispo: floors  Code: full code   PPM maintenance- due for interrogation , unable to do outside hospital , if possible should interrogate while in house DVT: Heparin SubQ   Dispo: floors  Code: full code

## 2018-08-04 VITALS
OXYGEN SATURATION: 98 % | TEMPERATURE: 99 F | HEART RATE: 60 BPM | SYSTOLIC BLOOD PRESSURE: 105 MMHG | DIASTOLIC BLOOD PRESSURE: 67 MMHG | RESPIRATION RATE: 18 BRPM

## 2018-08-04 PROCEDURE — 99239 HOSP IP/OBS DSCHRG MGMT >30: CPT

## 2018-08-04 PROCEDURE — 85027 COMPLETE CBC AUTOMATED: CPT

## 2018-08-04 PROCEDURE — 97162 PT EVAL MOD COMPLEX 30 MIN: CPT

## 2018-08-04 PROCEDURE — 96375 TX/PRO/DX INJ NEW DRUG ADDON: CPT

## 2018-08-04 PROCEDURE — 87486 CHLMYD PNEUM DNA AMP PROBE: CPT

## 2018-08-04 PROCEDURE — 82435 ASSAY OF BLOOD CHLORIDE: CPT

## 2018-08-04 PROCEDURE — 81001 URINALYSIS AUTO W/SCOPE: CPT

## 2018-08-04 PROCEDURE — 83735 ASSAY OF MAGNESIUM: CPT

## 2018-08-04 PROCEDURE — 82330 ASSAY OF CALCIUM: CPT

## 2018-08-04 PROCEDURE — 82947 ASSAY GLUCOSE BLOOD QUANT: CPT

## 2018-08-04 PROCEDURE — 85610 PROTHROMBIN TIME: CPT

## 2018-08-04 PROCEDURE — 99285 EMERGENCY DEPT VISIT HI MDM: CPT | Mod: 25

## 2018-08-04 PROCEDURE — 87086 URINE CULTURE/COLONY COUNT: CPT

## 2018-08-04 PROCEDURE — 87581 M.PNEUMON DNA AMP PROBE: CPT

## 2018-08-04 PROCEDURE — 83690 ASSAY OF LIPASE: CPT

## 2018-08-04 PROCEDURE — 96365 THER/PROPH/DIAG IV INF INIT: CPT

## 2018-08-04 PROCEDURE — 71045 X-RAY EXAM CHEST 1 VIEW: CPT

## 2018-08-04 PROCEDURE — 85014 HEMATOCRIT: CPT

## 2018-08-04 PROCEDURE — 80053 COMPREHEN METABOLIC PANEL: CPT

## 2018-08-04 PROCEDURE — 84132 ASSAY OF SERUM POTASSIUM: CPT

## 2018-08-04 PROCEDURE — 84295 ASSAY OF SERUM SODIUM: CPT

## 2018-08-04 PROCEDURE — 87798 DETECT AGENT NOS DNA AMP: CPT

## 2018-08-04 PROCEDURE — 71250 CT THORAX DX C-: CPT

## 2018-08-04 PROCEDURE — 74176 CT ABD & PELVIS W/O CONTRAST: CPT

## 2018-08-04 PROCEDURE — 82803 BLOOD GASES ANY COMBINATION: CPT

## 2018-08-04 PROCEDURE — 70450 CT HEAD/BRAIN W/O DYE: CPT

## 2018-08-04 PROCEDURE — 87150 DNA/RNA AMPLIFIED PROBE: CPT

## 2018-08-04 PROCEDURE — 84100 ASSAY OF PHOSPHORUS: CPT

## 2018-08-04 PROCEDURE — 83605 ASSAY OF LACTIC ACID: CPT

## 2018-08-04 PROCEDURE — 87040 BLOOD CULTURE FOR BACTERIA: CPT

## 2018-08-04 PROCEDURE — 87633 RESP VIRUS 12-25 TARGETS: CPT

## 2018-08-04 PROCEDURE — 80048 BASIC METABOLIC PNL TOTAL CA: CPT

## 2018-08-04 PROCEDURE — 85730 THROMBOPLASTIN TIME PARTIAL: CPT

## 2018-08-04 RX ORDER — MOXIFLOXACIN HYDROCHLORIDE TABLETS, 400 MG 400 MG/1
1 TABLET, FILM COATED ORAL
Qty: 2 | Refills: 0 | OUTPATIENT
Start: 2018-08-04 | End: 2018-08-05

## 2018-08-04 RX ORDER — MOXIFLOXACIN HYDROCHLORIDE TABLETS, 400 MG 400 MG/1
1 TABLET, FILM COATED ORAL
Qty: 5 | Refills: 0 | OUTPATIENT
Start: 2018-08-04 | End: 2018-08-05

## 2018-08-04 RX ORDER — AMLODIPINE BESYLATE 2.5 MG/1
1 TABLET ORAL
Qty: 14 | Refills: 0
Start: 2018-08-04 | End: 2018-08-17

## 2018-08-04 RX ORDER — MOXIFLOXACIN HYDROCHLORIDE TABLETS, 400 MG 400 MG/1
1 TABLET, FILM COATED ORAL
Qty: 5 | Refills: 0
Start: 2018-08-04 | End: 2018-08-05

## 2018-08-04 RX ADMIN — HEPARIN SODIUM 5000 UNIT(S): 5000 INJECTION INTRAVENOUS; SUBCUTANEOUS at 05:23

## 2018-08-04 RX ADMIN — PIPERACILLIN AND TAZOBACTAM 25 GRAM(S): 4; .5 INJECTION, POWDER, LYOPHILIZED, FOR SOLUTION INTRAVENOUS at 05:22

## 2018-08-04 RX ADMIN — HEPARIN SODIUM 5000 UNIT(S): 5000 INJECTION INTRAVENOUS; SUBCUTANEOUS at 13:17

## 2018-08-04 NOTE — PROGRESS NOTE ADULT - PROBLEM SELECTOR PROBLEM 2
Pacemaker
SIRS (systemic inflammatory response syndrome)
Pacemaker

## 2018-08-04 NOTE — PROGRESS NOTE ADULT - PROBLEM SELECTOR PROBLEM 3
SIRS (systemic inflammatory response syndrome)
CAD (Coronary Artery Disease)
SIRS (systemic inflammatory response syndrome)

## 2018-08-04 NOTE — PROGRESS NOTE ADULT - PROBLEM SELECTOR PROBLEM 6
Benign Essential Hypertension
Nutrition, metabolism, and development symptoms
Benign Essential Hypertension
Dyslipidemia
Dyslipidemia

## 2018-08-04 NOTE — PROGRESS NOTE ADULT - ASSESSMENT
89 wheel chair/bed bound F w/ dementia, (baseline per prior documentation is AOx1-2 at best), AFIB (not on AC), PPM, HTN, HLD, recent admission for gall stone pancreatitis and UTI now p/w encephalopathy and vomiting likely secondary to an infection. She is improving and is much more alert.
89 wheel chair/bed bound F w/ dementia, (baseline per prior documentation is AOx1-2 at best), AFIB (not on AC), PPM, HTN, HLD, recent admission and discharged yesterday for gall stone pancreatitis and UTI now p/w encephalopathy and vomiting
89 wheel chair/bed bound F w/ dementia, (baseline per prior documentation is AOx1-2 at best), AFIB (not on AC), PPM, HTN, HLD, recent admission for gall stone pancreatitis and UTI now p/w encephalopathy and vomiting likely secondary to an infection.
89 wheel chair/bed bound F w/ dementia, (baseline per prior documentation is AOx1-2 at best), AFIB (not on AC), PPM, HTN, HLD, recent admission for gall stone pancreatitis and UTI now p/w encephalopathy and vomiting likely secondary to an infection. She is improving and more alert.
89 wheel chair/bed bound F w/ dementia, (baseline per prior documentation is AOx1-2 at best), AFIB (not on AC), PPM, HTN, HLD, recent admission for gall stone pancreatitis and UTI now p/w encephalopathy and vomiting likely secondary to an infection. She is improving and is much more alert.

## 2018-08-04 NOTE — PROGRESS NOTE ADULT - PROBLEM SELECTOR PLAN 6
Pt takes Amlodipine 5 at home  - monitor BP  - c/t hold Amlodipine at this time
DASH/TLC diet, soft
Pt takes Amlodipine 5 at home  - monitor BP  - c/t hold Amlodipine at this time
Pt takes Atorvastatin 40 at home  - held Atorvastatin given that pt had no mental status
Pt takes Atorvastatin 40 at home  - hold Atorvastatin given that pt has no mental status to safely take PO meds

## 2018-08-04 NOTE — PROGRESS NOTE ADULT - PROBLEM SELECTOR PROBLEM 1
Altered mental status

## 2018-08-04 NOTE — PROGRESS NOTE ADULT - SUBJECTIVE AND OBJECTIVE BOX
Authored by Dr. Saldivar, PGY-1 [phone]  Medicine Team 3  After 7 PM on weekdays and after 12 PM on the weekends, please page [phone].    Patient is a 89y old  Female who presents with a chief complaint of encephalopathy (02 Aug 2018 13:41)    SUBJECTIVE / OVERNIGHT EVENTS:    No events o/n. Pt has no concerns or complaints.    MEDICATIONS  (STANDING):  heparin  Injectable 5000 Unit(s) SubCutaneous every 8 hours  piperacillin/tazobactam IVPB. 3.375 Gram(s) IV Intermittent every 8 hours    MEDICATIONS  (PRN):      Vital Signs Last 24 Hrs  T(C): 36.6 (04 Aug 2018 04:34), Max: 37.2 (03 Aug 2018 10:37)  T(F): 97.8 (04 Aug 2018 04:34), Max: 98.9 (03 Aug 2018 10:37)  HR: 68 (04 Aug 2018 04:34) (58 - 68)  BP: 137/69 (04 Aug 2018 04:34) (132/77 - 147/65)  BP(mean): --  RR: 17 (04 Aug 2018 04:34) (17 - 18)  SpO2: 95% (04 Aug 2018 04:34) (93% - 98%)  CAPILLARY BLOOD GLUCOSE        I&O's Summary    03 Aug 2018 07:01  -  04 Aug 2018 07:00  --------------------------------------------------------  IN: 700 mL / OUT: 0 mL / NET: 700 mL        PHYSICAL EXAM  GENERAL: NAD, well-developed, somnolent  CHEST/LUNG: Clear to auscultation bilaterally; No wheeze  HEART: Regular rate and rhythm; No murmurs  ABDOMEN: Soft, Nontender, Nondistended; Bowel sounds present  EXTREMITIES:  2+ Peripheral Pulses, No clubbing, cyanosis, or edema  NEURO: nonfocal, AOx3  PSYCH: calm   SKIN: No rashes or lesions    LABS:                        10.7   10.78 )-----------( 247      ( 03 Aug 2018 09:15 )             32.5     08-03    139  |  106  |  36<H>  ----------------------------<  133<H>  4.4   |  21<L>  |  1.88<H>    Ca    8.7      03 Aug 2018 07:04  Phos  3.3     08-03  Mg     1.9     08-03                Culture - Blood (collected 02 Aug 2018 09:18)  Source: .Blood Blood-Venous  Preliminary Report (03 Aug 2018 10:01):    No growth to date.    Culture - Blood (collected 02 Aug 2018 09:18)  Source: .Blood Blood-Peripheral  Preliminary Report (03 Aug 2018 10:01):    No growth to date.        RADIOLOGY & ADDITIONAL TESTS:    Imaging Personally Reviewed:  Consultant(s) Notes Reviewed:    Care Discussed with Consultants/Other Providers:
Authored by Dr Alcantar [phone]    Patient is a 89y old  Female who presents with a chief complaint of encephalopathy (2018 05:12)    SUBJECTIVE / OVERNIGHT EVENTS:    No events o/n. Pt has no concerns or complaints.    MEDICATIONS  (STANDING):  piperacillin/tazobactam IVPB. 3.375 Gram(s) IV Intermittent every 8 hours  vancomycin  IVPB 1000 milliGRAM(s) IV Intermittent daily    MEDICATIONS  (PRN):      Vital Signs Last 24 Hrs  T(C): 36.8 (01 Aug 2018 04:02), Max: 37.1 (01 Aug 2018 01:07)  T(F): 98.3 (01 Aug 2018 04:02), Max: 98.8 (01 Aug 2018 01:07)  HR: 63 (01 Aug 2018 04:02) (61 - 69)  BP: 124/70 (01 Aug 2018 04:02) (119/55 - 139/75)  BP(mean): --  RR: 18 (01 Aug 2018 04:02) (18 - 18)  SpO2: 99% (01 Aug 2018 04:02) (98% - 100%)  CAPILLARY BLOOD GLUCOSE        I&O's Summary    2018 07:01  -  01 Aug 2018 07:00  --------------------------------------------------------  IN: 300 mL / OUT: 0 mL / NET: 300 mL        PHYSICAL EXAM  GENERAL: NAD, well-developed, very somnolent  EYES: conjunctiva and sclera clear  NECK: Supple, No JVD  CHEST/LUNG: Clear to auscultation bilaterally  HEART: Regular rate and rhythm; No murmurs  ABDOMEN: Soft, Nontender, Nondistended; Bowel sounds present  EXTREMITIES:  2+ Peripheral Pulses, No clubbing, cyanosis, or edema  NEURO: AOx1  PSYCH: calm   SKIN: bruising in the upper extremities and hands    LABS:                        11.8   15.4  )-----------( 205      ( 2018 01:19 )             35.1     08-01    141  |  107  |  25<H>  ----------------------------<  85  4.1   |  22  |  1.60<H>    Ca    9.0      01 Aug 2018 06:18  Phos  3.6       Mg     1.9     -    TPro  6.2  /  Alb  2.8<L>  /  TBili  0.6  /  DBili  x   /  AST  20  /  ALT  41  /  AlkPhos  108      PT/INR - ( 2018 01:19 )   PT: 12.8 sec;   INR: 1.17 ratio         PTT - ( 2018 01:19 )  PTT:30.1 sec      Urinalysis Basic - ( 2018 01:18 )    Color: Yellow / Appearance: Clear / S.017 / pH: x  Gluc: x / Ketone: Negative  / Bili: Negative / Urobili: Negative   Blood: x / Protein: 100 mg/dL / Nitrite: Negative   Leuk Esterase: Negative / RBC: 0-2 /HPF / WBC 0-2 /HPF   Sq Epi: x / Non Sq Epi: OCC /HPF / Bacteria: Few /HPF        Culture - Urine (collected 2018 02:14)  Source: .Urine Clean Catch (Midstream)  Final Report (2018 23:22):    No growth    Culture - Blood (collected 2018 02:07)  Source: .Blood Blood  Preliminary Report (01 Aug 2018 03:01):    No growth to date.    Culture - Blood (collected 2018 02:07)  Source: .Blood Blood  Preliminary Report (01 Aug 2018 03:01):    No growth to date.        RADIOLOGY & ADDITIONAL TESTS:    Imaging Personally Reviewed:  Consultant(s) Notes Reviewed:    Care Discussed with Consultants/Other Providers:
Authored by Dr Saldivar [phone]    Patient is a 89y old  Female who presents with a chief complaint of encephalopathy (2018 05:12)    SUBJECTIVE / OVERNIGHT EVENTS:    Patient admitted to service o/n. Pt sleepy this AM and would only open eyes and fall back asleep.    MEDICATIONS  (STANDING):  piperacillin/tazobactam IVPB. 3.375 Gram(s) IV Intermittent every 8 hours  vancomycin  IVPB 1000 milliGRAM(s) IV Intermittent once    MEDICATIONS  (PRN):      Vital Signs Last 24 Hrs  T(C): 36.6 (2018 05:58), Max: 38.1 (2018 01:05)  T(F): 97.9 (2018 05:58), Max: 100.6 (2018 01:05)  HR: 65 (2018 05:58) (65 - 77)  BP: 118/60 (2018 05:58) (118/60 - 145/77)  BP(mean): --  RR: 18 (2018 05:58) (16 - 18)  SpO2: 99% (2018 05:58) (96% - 99%)  CAPILLARY BLOOD GLUCOSE        I&O's Summary    2018 07:01  -  2018 07:00  --------------------------------------------------------  IN: 0 mL / OUT: 0 mL / NET: 0 mL        PHYSICAL EXAM  GENERAL: NAD, well-developed  EYES: conjunctiva and sclera clear  NECK: Supple, No JVD  ENT: MMM  CHEST/LUNG: Clear to auscultation bilaterally; No wheeze  HEART: Regular rate and rhythm; No murmurs  ABDOMEN: Soft, Nontender, Nondistended; Bowel sounds present  EXTREMITIES:  2+ Peripheral Pulses, No clubbing, cyanosis, or edema  NEURO: A&O x0  PSYCH: calm   SKIN: No rashes or lesions    LABS:                        11.8   15.4  )-----------( 205      ( 2018 01:19 )             35.1     07-31    137  |  102  |  29<H>  ----------------------------<  132<H>  4.1   |  23  |  1.78<H>    Ca    9.3      2018 01:19    TPro  7.1  /  Alb  3.4  /  TBili  0.9  /  DBili  x   /  AST  32  /  ALT  68<H>  /  AlkPhos  145<H>  07-31    PT/INR - ( 2018 01:19 )   PT: 12.8 sec;   INR: 1.17 ratio         PTT - ( 2018 01:19 )  PTT:30.1 sec      Urinalysis Basic - ( 2018 01:18 )    Color: Yellow / Appearance: Clear / S.017 / pH: x  Gluc: x / Ketone: Negative  / Bili: Negative / Urobili: Negative   Blood: x / Protein: 100 mg/dL / Nitrite: Negative   Leuk Esterase: Negative / RBC: 0-2 /HPF / WBC 0-2 /HPF   Sq Epi: x / Non Sq Epi: OCC /HPF / Bacteria: Few /HPF          RADIOLOGY & ADDITIONAL TESTS:    Imaging Personally Reviewed:  Consultant(s) Notes Reviewed:    Care Discussed with Consultants/Other Providers:
Authored by Dr Saldivar, PGY-1 [phone]  Medicine Team 3  After 7 PM, please page [phone]    Patient is a 89y old  Female who presents with a chief complaint of encephalopathy (31 Jul 2018 05:12)    SUBJECTIVE / OVERNIGHT EVENTS:    No events o/n, patient has no concerns or complaints.    MEDICATIONS  (STANDING):  heparin  Injectable 5000 Unit(s) SubCutaneous every 8 hours  piperacillin/tazobactam IVPB. 3.375 Gram(s) IV Intermittent every 8 hours    MEDICATIONS  (PRN):      Vital Signs Last 24 Hrs  T(C): 36.9 (02 Aug 2018 04:03), Max: 37.1 (01 Aug 2018 21:03)  T(F): 98.4 (02 Aug 2018 04:03), Max: 98.8 (01 Aug 2018 21:03)  HR: 70 (02 Aug 2018 04:03) (60 - 94)  BP: 122/73 (02 Aug 2018 04:03) (100/52 - 146/76)  BP(mean): --  RR: 18 (02 Aug 2018 04:03) (18 - 18)  SpO2: 98% (02 Aug 2018 04:03) (97% - 100%)  CAPILLARY BLOOD GLUCOSE        I&O's Summary    01 Aug 2018 07:01  -  02 Aug 2018 07:00  --------------------------------------------------------  IN: 1000 mL / OUT: 0 mL / NET: 1000 mL        PHYSICAL EXAM  GENERAL: NAD, well-developed, somnolent  EYES: conjunctiva and sclera clear  NECK: Supple, No JVD  ENT: MMM  CHEST/LUNG: Clear to auscultation bilaterally; No wheeze  HEART: Regular rate and rhythm; No murmurs  ABDOMEN: Soft, Nontender, Nondistended; Bowel sounds present  EXTREMITIES:  2+ Peripheral Pulses, No clubbing, cyanosis, or edema  NEURO: nonfocal, AOx3  PSYCH: calm   SKIN: No rashes or lesions    LABS:                        11.3   10.02 )-----------( 203      ( 01 Aug 2018 08:45 )             34.3     08-02    139  |  105  |  34<H>  ----------------------------<  152<H>  4.2   |  23  |  1.76<H>    Ca    8.7      02 Aug 2018 06:01  Phos  3.6     08-02  Mg     1.9     08-02    TPro  6.2  /  Alb  2.8<L>  /  TBili  0.6  /  DBili  x   /  AST  20  /  ALT  41  /  AlkPhos  108  08-01              Culture - Urine (collected 31 Jul 2018 02:14)  Source: .Urine Clean Catch (Midstream)  Final Report (31 Jul 2018 23:22):    No growth    Culture - Blood (collected 31 Jul 2018 02:07)  Source: .Blood Blood  Gram Stain (01 Aug 2018 12:09):    Growth in aerobic bottle: Gram Positive Cocci in Clusters  Preliminary Report (01 Aug 2018 12:09):    Growth in aerobic bottle: Gram Positive Cocci in Clusters    "Due to technical problems, Proteus sp. will Not be reported as part of    the BCID panel until further notice"    ***Blood Panel PCR results on this specimen are available    approximately 3 hours after the Gram stain result.***    Gram stain, PCR, and/or culture results may not always    correspond due to difference in methodologies.    ************************************************************    This PCR assay was performed using [employer].    The following targets are tested for: Enterococcus,    vancomycin resistant enterococci, Listeria monocytogenes,    coagulase negative staphylococci, S. aureus,    methicillin resistant S. aureus, Streptococcus agalactiae    (Group B), S. pneumoniae, S.pyogenes (Group A),    Acinetobacter baumannii, Enterobacter cloacae, E. coli,    Klebsiella oxytoca, K. pneumoniae, Proteus sp.,    Serratia marcescens, Haemophilus influenzae,    Neisseria meningitidis, Pseudomonas aeruginosa, Candida    albicans, C. glabrata, C krusei, C parapsilosis,    C. tropicalis and the KPC resistance gene.  Organism: Blood Culture PCR (01 Aug 2018 13:49)  Organism: Blood Culture PCR (01 Aug 2018 13:49)    Culture - Blood (collected 31 Jul 2018 02:07)  Source: .Blood Blood  Preliminary Report (01 Aug 2018 03:01):    No growth to date.        RADIOLOGY & ADDITIONAL TESTS:    Imaging Personally Reviewed:  Consultant(s) Notes Reviewed:    Care Discussed with Consultants/Other Providers:
Authored by Dr. Saldivar, PGY-1 [phone]  Medicine Team 3  After 7 PM on weekdays and after 12 PM on the weekends, please page [phone].    Patient is a 89y old  Female who presents with a chief complaint of encephalopathy (02 Aug 2018 13:41)    SUBJECTIVE / OVERNIGHT EVENTS:    No events o/n. Pt has no concerns or complaints.    MEDICATIONS  (STANDING):  heparin  Injectable 5000 Unit(s) SubCutaneous every 8 hours  piperacillin/tazobactam IVPB. 3.375 Gram(s) IV Intermittent every 8 hours    MEDICATIONS  (PRN):      Vital Signs Last 24 Hrs  T(C): 37.2 (03 Aug 2018 04:18), Max: 37.3 (03 Aug 2018 01:12)  T(F): 98.9 (03 Aug 2018 04:18), Max: 99.1 (03 Aug 2018 01:12)  HR: 63 (03 Aug 2018 04:18) (63 - 74)  BP: 145/70 (03 Aug 2018 04:18) (126/57 - 145/70)  BP(mean): --  RR: 17 (03 Aug 2018 04:18) (17 - 18)  SpO2: 95% (03 Aug 2018 04:18) (95% - 97%)  CAPILLARY BLOOD GLUCOSE        I&O's Summary    01 Aug 2018 07:01  -  02 Aug 2018 07:00  --------------------------------------------------------  IN: 1000 mL / OUT: 0 mL / NET: 1000 mL    02 Aug 2018 07:01  -  03 Aug 2018 06:47  --------------------------------------------------------  IN: 1100 mL / OUT: 0 mL / NET: 1100 mL        PHYSICAL EXAM  GENERAL: NAD, well-developed, more alert this AM  EYES: conjunctiva and sclera clear  NECK: Supple, No JVD  ENT: MMM  CHEST/LUNG: Clear to auscultation bilaterally; No wheeze  HEART: Regular rate and rhythm; No murmurs  ABDOMEN: Soft, Nontender, Nondistended; Bowel sounds present  EXTREMITIES:  2+ Peripheral Pulses, No clubbing, cyanosis, or edema  NEURO: nonfocal, AOx1-2  PSYCH: calm   SKIN: extensive bruising in the left arm     LABS:                        10.7   10.14 )-----------( 235      ( 02 Aug 2018 07:33 )             33.1     08-02    139  |  105  |  34<H>  ----------------------------<  152<H>  4.2   |  23  |  1.76<H>    Ca    8.7      02 Aug 2018 06:01  Phos  3.6     08-02  Mg     1.9     08-02                  RADIOLOGY & ADDITIONAL TESTS:    Imaging Personally Reviewed:  Consultant(s) Notes Reviewed:    Care Discussed with Consultants/Other Providers:

## 2018-08-04 NOTE — PROGRESS NOTE ADULT - PROBLEM SELECTOR PLAN 4
Given age of patient and co-morbid conditions, will not start statin.
Given age of patient and co-morbid conditions, will not start statin.
Pt has hx of AFIB, not on any AC, and had a syncopal episode several years back; has a PPM; CHADS VASc is 4; pt has not been on AC for 10-15 years as her doctors monitored her as an outpatient and didn't feel it was necessary; however, w/ CHADs VASc of 4, pt should be anticoagulated but must r/o bleed first   - would hold off on AC at this time
Pt has hx of AFIB, not on any AC, and had a syncopal episode several years back; has a PPM; CHADS VASc is 4; pt has not been on AC for 10-15 years as her doctors monitored her as an outpatient and didn't feel it was necessary; however, w/ CHADs VASc of 4, pt should be anticoagulated but must r/o bleed first   - would hold off on AC at this time  - would have discussion w/ family about anticoagulation
Pt has hx of AFIB, however, son (HCP) does not want to start AC.

## 2018-08-04 NOTE — PROGRESS NOTE ADULT - PROBLEM SELECTOR PLAN 3
Pt afebrile for over 4 days with resolved leukocytosis  - BCx2 and UCx NGTD (7/31)  - zosyn 3.375 g q8hrs (day 5)
- consider starting ASA 81mg  - Atorvastatin 40 mg
Given age of patient and co-morbid conditions, will not start statin.
Pt has CAD, no recent lipid profile so unable to calculate ASCVD, but likely should be on ASA 81  - will have to hold Atorvastatin 40 given that pt has no mental status to safely take PO; can discuss w/ family about placing an NGT
Pt afebrile for over 3 days with resolving leukocytosis  - BCx2 and UCx NGTD (7/31)  - zosyn 3.375 g q8hrs (day 4)

## 2018-08-04 NOTE — PROGRESS NOTE ADULT - PROBLEM SELECTOR PLAN 5
Pt has hx of AFIB, however, son (HCP) does not want to start AC.
Pt has hx of AFIB, however, son (HCP) does not want to start AC.
Pt takes Amlodipine 5 at home  - monitor BP  - c/t hold Amlodipine at this time
Pt takes Amlodipine 5 at home  - monitor BP  - hold Amlodipine 5 at this time given that pt has no mental status to safely take PO meds; can discuss w/ HCP about putting in NGT to give meds
Pt takes Amlodipine 5 at home  - monitor BP  - hold Amlodipine 5 at this time given that pt has no mental status to safely take PO meds; can discuss w/ HCP about putting in NGT to give meds

## 2018-08-04 NOTE — PROGRESS NOTE ADULT - PROBLEM SELECTOR PLAN 1
Encephalopathy likely 2/2 infection. Resolving and improving  - BC NGTD  - UC NGTD  - c/w zosyn 3.375 g IV q8hrs (day 5). Will need 2 more days to complete 7 day course w/ciprofloxacin 500mg qD.

## 2018-08-04 NOTE — PROGRESS NOTE ADULT - ATTENDING COMMENTS
Encephalopathy, will treat as infectious for now, unclear source, CT chest to r/o PNA (including aspiration). Meningitis another possible source.  Symptoms may also be secondary to CVA, MRI may not be an option due to PPM.
Metabolic encephalopathy secondary to UTI / intraabdominal infection.  Cultures negative due to prior use of abx.  Continue Zosyn.  Anticipate 5 day course (can complete course with po Levaquin upon d/c).  If mental status continues to improve hopeful for d/c on 8/4.
d/c planning for today, total d/c time 35 minutes
Metabolic encephalopathy, presumably from an intraabdominal infectious source.  Continue empiric Zosyn, can d/c Vanco as cultures are negative.  Patient more alert today.

## 2018-08-04 NOTE — PROGRESS NOTE ADULT - PROBLEM SELECTOR PLAN 7
DASH/TLC diet, soft
DVT: Heparin SubQ   Dispo: floors  Code: full code   PPM maintenance- due for interrogation , unable to do outside hospital , if possible should interrogate while in house
DASH/TLC diet, soft
NPO 2/2 mental status
NPO 2/2 mental status

## 2018-08-04 NOTE — PROGRESS NOTE ADULT - PROBLEM SELECTOR PROBLEM 7
Nutrition, metabolism, and development symptoms
Prophylactic measure
Nutrition, metabolism, and development symptoms

## 2018-08-04 NOTE — PROGRESS NOTE ADULT - PROBLEM SELECTOR PLAN 2
PPM interrogated by EP in house. One ventricular high rate on 12/23/17 stored for 4 seconds via marker channel. Two atrial high rates on 6/26/18 and 8/1/18 also noted for PAFlutter/PAF, duration 32 secs and 8 hrs 51 secs. Normal pacemaker function.
Pt afebrile for 24 hours   - BCx2 and UCx NGTD (7/31)  - c/w vancomycin 1 g qD (day 2) and zosyn 3.375 g q8hrs (day 2)
Pt afebrile for 48 hours   - BCx2 and UCx NGTD (7/31)  - zosyn 3.375 g q8hrs (day 3)
Pt has leukocytosis and fever, no clear source at this time; RVP negative; CT abdomen/pelvis showing no signs of acute cholecystitis; other source is pulmonary in the setting of aspiration pneumonia vs pneumonitis, intracerebral such as meningitis or encephalitis vs neoplastic process  - f/u BCx and UCx  - would empirically start Zosyn as pt was previously taking Metronidazole and Cephalexin; may have only partially treated the UTI when switching to Flagyl and Cephalexin  - would consider CT chest if no response to Zosyn  - would also consider LP w/ serology/gram stain/PCR if no response to Zosyn
PPM interrogated by EP in house. One ventricular high rate on 12/23/17 stored for 4 seconds via marker channel. Two atrial high rates on 6/26/18 and 8/1/18 also noted for PAFlutter/PAF, duration 32 secs and 8 hrs 51 secs. Normal pacemaker function.

## 2018-08-05 LAB
CULTURE RESULTS: SIGNIFICANT CHANGE UP
SPECIMEN SOURCE: SIGNIFICANT CHANGE UP

## 2018-08-07 LAB
CULTURE RESULTS: SIGNIFICANT CHANGE UP
CULTURE RESULTS: SIGNIFICANT CHANGE UP
SPECIMEN SOURCE: SIGNIFICANT CHANGE UP
SPECIMEN SOURCE: SIGNIFICANT CHANGE UP

## 2018-08-17 ENCOUNTER — APPOINTMENT (OUTPATIENT)
Dept: DERMATOLOGY | Facility: CLINIC | Age: 83
End: 2018-08-17
Payer: MEDICARE

## 2018-08-17 VITALS — SYSTOLIC BLOOD PRESSURE: 122 MMHG | DIASTOLIC BLOOD PRESSURE: 64 MMHG

## 2018-08-17 PROCEDURE — 99213 OFFICE O/P EST LOW 20 MIN: CPT

## 2018-08-20 ENCOUNTER — APPOINTMENT (OUTPATIENT)
Dept: GERIATRICS | Facility: CLINIC | Age: 83
End: 2018-08-20
Payer: MEDICARE

## 2018-08-20 VITALS
SYSTOLIC BLOOD PRESSURE: 106 MMHG | HEART RATE: 66 BPM | DIASTOLIC BLOOD PRESSURE: 76 MMHG | BODY MASS INDEX: 30.18 KG/M2 | OXYGEN SATURATION: 98 % | WEIGHT: 187 LBS | TEMPERATURE: 97.9 F

## 2018-08-20 DIAGNOSIS — Z82.0 FAMILY HISTORY OF EPILEPSY AND OTHER DISEASES OF THE NERVOUS SYSTEM: ICD-10-CM

## 2018-08-20 PROCEDURE — 99214 OFFICE O/P EST MOD 30 MIN: CPT

## 2018-08-21 ENCOUNTER — TRANSCRIPTION ENCOUNTER (OUTPATIENT)
Age: 83
End: 2018-08-21

## 2018-08-21 ENCOUNTER — APPOINTMENT (OUTPATIENT)
Dept: ELECTROPHYSIOLOGY | Facility: CLINIC | Age: 83
End: 2018-08-21

## 2018-09-13 ENCOUNTER — APPOINTMENT (OUTPATIENT)
Dept: DERMATOLOGY | Facility: CLINIC | Age: 83
End: 2018-09-13
Payer: MEDICARE

## 2018-09-13 VITALS — SYSTOLIC BLOOD PRESSURE: 124 MMHG | DIASTOLIC BLOOD PRESSURE: 60 MMHG

## 2018-09-13 PROCEDURE — 99213 OFFICE O/P EST LOW 20 MIN: CPT

## 2018-10-02 ENCOUNTER — APPOINTMENT (OUTPATIENT)
Dept: DERMATOLOGY | Facility: CLINIC | Age: 83
End: 2018-10-02

## 2018-11-15 ENCOUNTER — APPOINTMENT (OUTPATIENT)
Dept: DERMATOLOGY | Facility: CLINIC | Age: 83
End: 2018-11-15
Payer: MEDICARE

## 2018-11-15 ENCOUNTER — LABORATORY RESULT (OUTPATIENT)
Age: 83
End: 2018-11-15

## 2018-11-15 PROCEDURE — 99213 OFFICE O/P EST LOW 20 MIN: CPT | Mod: 25

## 2018-11-15 PROCEDURE — 11100 BX SKIN SUBCUTANEOUS&/MUCOUS MEMBRANE 1 LESION: CPT

## 2018-11-21 ENCOUNTER — APPOINTMENT (OUTPATIENT)
Dept: GERIATRICS | Facility: CLINIC | Age: 83
End: 2018-11-21
Payer: MEDICARE

## 2018-11-21 VITALS
TEMPERATURE: 98.3 F | DIASTOLIC BLOOD PRESSURE: 70 MMHG | RESPIRATION RATE: 15 BRPM | SYSTOLIC BLOOD PRESSURE: 130 MMHG | HEIGHT: 66 IN | HEART RATE: 62 BPM | OXYGEN SATURATION: 98 %

## 2018-11-21 DIAGNOSIS — Z85.828 PERSONAL HISTORY OF OTHER MALIGNANT NEOPLASM OF SKIN: ICD-10-CM

## 2018-11-21 DIAGNOSIS — B97.89 ACUTE UPPER RESPIRATORY INFECTION, UNSPECIFIED: ICD-10-CM

## 2018-11-21 DIAGNOSIS — C44.629 SQUAMOUS CELL CARCINOMA OF SKIN OF LEFT UPPER LIMB, INCLUDING SHOULDER: ICD-10-CM

## 2018-11-21 DIAGNOSIS — D49.89 NEOPLASM OF UNSPECIFIED BEHAVIOR OF OTHER SPECIFIED SITES: ICD-10-CM

## 2018-11-21 DIAGNOSIS — J06.9 ACUTE UPPER RESPIRATORY INFECTION, UNSPECIFIED: ICD-10-CM

## 2018-11-21 PROCEDURE — 99214 OFFICE O/P EST MOD 30 MIN: CPT | Mod: GC

## 2018-11-21 RX ORDER — CEFUROXIME AXETIL 500 MG/1
500 TABLET ORAL
Qty: 14 | Refills: 0 | Status: DISCONTINUED | COMMUNITY
Start: 2018-05-16 | End: 2018-11-21

## 2018-11-21 RX ORDER — CEFUROXIME AXETIL 250 MG/1
250 TABLET ORAL
Qty: 14 | Refills: 0 | Status: DISCONTINUED | COMMUNITY
Start: 2018-03-22 | End: 2018-11-21

## 2018-11-21 RX ORDER — CEPHALEXIN 500 MG/1
500 CAPSULE ORAL
Qty: 20 | Refills: 0 | Status: DISCONTINUED | COMMUNITY
Start: 2018-05-26 | End: 2018-11-21

## 2018-11-21 RX ORDER — SULFAMETHOXAZOLE AND TRIMETHOPRIM 800; 160 MG/1; MG/1
800-160 TABLET ORAL
Qty: 14 | Refills: 0 | Status: DISCONTINUED | COMMUNITY
Start: 2018-03-01 | End: 2018-11-21

## 2018-11-21 RX ORDER — CIPROFLOXACIN HYDROCHLORIDE 500 MG/1
500 TABLET, FILM COATED ORAL
Qty: 6 | Refills: 0 | Status: DISCONTINUED | COMMUNITY
Start: 2018-06-07 | End: 2018-11-21

## 2018-12-11 ENCOUNTER — APPOINTMENT (OUTPATIENT)
Dept: INFECTIOUS DISEASE | Facility: CLINIC | Age: 83
End: 2018-12-11
Payer: MEDICARE

## 2018-12-11 VITALS
DIASTOLIC BLOOD PRESSURE: 73 MMHG | HEIGHT: 66 IN | SYSTOLIC BLOOD PRESSURE: 137 MMHG | HEART RATE: 77 BPM | TEMPERATURE: 97.2 F | RESPIRATION RATE: 15 BRPM | OXYGEN SATURATION: 100 %

## 2018-12-11 PROCEDURE — 99203 OFFICE O/P NEW LOW 30 MIN: CPT

## 2019-01-28 ENCOUNTER — LABORATORY RESULT (OUTPATIENT)
Age: 84
End: 2019-01-28

## 2019-01-28 ENCOUNTER — APPOINTMENT (OUTPATIENT)
Dept: DERMATOLOGY | Facility: CLINIC | Age: 84
End: 2019-01-28
Payer: MEDICARE

## 2019-01-28 DIAGNOSIS — Z12.83 ENCOUNTER FOR SCREENING FOR MALIGNANT NEOPLASM OF SKIN: ICD-10-CM

## 2019-01-28 DIAGNOSIS — L24.9 IRRITANT CONTACT DERMATITIS, UNSPECIFIED CAUSE: ICD-10-CM

## 2019-01-28 DIAGNOSIS — R22.9 LOCALIZED SWELLING, MASS AND LUMP, UNSPECIFIED: ICD-10-CM

## 2019-01-28 DIAGNOSIS — R23.8 OTHER SKIN CHANGES: ICD-10-CM

## 2019-01-28 PROCEDURE — 99214 OFFICE O/P EST MOD 30 MIN: CPT | Mod: 25

## 2019-01-28 PROCEDURE — 17000 DESTRUCT PREMALG LESION: CPT

## 2019-01-28 PROCEDURE — 17003 DESTRUCT PREMALG LES 2-14: CPT

## 2019-01-28 NOTE — ASSESSMENT
[FreeTextEntry1] : 1) Papule of skin, mid chest\par - Differential includes SCC vs. verruca vs. other\par - Shave bx performed\par - The risks/benefits/alternatives of skin biopsy were explained to the patient which include but are not limited to scar, bleeding, infection, and recurrence. The area was prepped with rubbing alcohol, lidocaine was injected for anesthesia and biopsy was performed. The patient tolerated the procedure well. \par \par 2) Candidal intertrigo/irritant contact dermatitis\par - Resolved\par - Cont  Triad (barrier cream) with diaper change, nystatin daily for maintenance\par \par 3) BCC, Nose\par - Pt has f/u scheduled with Dr. Goldberg to discuss further treatment options\par \par 4) Skin Nodule, right elbow\par - Differential is broad including both malignant and benign lesions\par - Referred to plastic surgery for excisional bx to r/o malignant neoplasm\par - Patient's son is asking if his mother can be referred to Dr. Goldberg for procedure, will send task to find out. \par \par 5) AKs, face/arms\par - Treated 7 lesions (2 face, 3 right arm, 2 left arm)  w/ LN2 X 2 cycles\par - The risks/benefits/alternatives of cryo-destruction was explained to the patient which include but are not limited to redness, pain, blistering, scar, discoloration of skin, and recurrence. The patient expressed understanding of these risks and agreed to the procedure. The procedure was well tolerated, without complication. We have discussed related skin care. \par \par 6) Skin cancer screening\par - No lesions clinically concerning for malignancy today\par - Discussed regular self skin checks and ABCDEs of skin cancer screening\par - Discussed regular sunscreen use\par - Pt instructed to report any new or changing lesions\par \par RTC 3 months for FBSE or sooner if any concerns

## 2019-01-28 NOTE — HISTORY OF PRESENT ILLNESS
[FreeTextEntry1] : sacral dermatitis eval and skin check [de-identified] : 89 y/o F presenting for F/U sacral dermatitis, thought to be irritant contact in the setting of incontinence with a possible component of candida. Improved with triad cream and nystatin powder. No new skin breakdown and home health aid reports not using triamcinolone cream that was prescribed at the last visit. HHA changes diaper at least 3 times a day and  keeps sacrum dry.\par \par Pt's son would like the ED&C sites on L arm for SCC examined. Also has 2x2 cm superfical skin wound on LLE below left knee with minor erythema that has healed with triple abx ointment. Has new 1 cm area of ulceration on lower lateral part of nose. 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 Darcie Goldberg.\par \par SHX: Mostly bedbound, is able to take a few steps for transfers.

## 2019-01-28 NOTE — REVIEW OF SYSTEMS
[Fatigue] : no fatigue [Joint Pain] : no joint pain [Dry/Gritty] : not dry/gritty [Headache] : no headache [Nausea] : no nausea [Shortness Of Breath] : no shortness of breath [Chest Pain] : no chest pain [Dysuria] : no dysuria

## 2019-01-28 NOTE — PHYSICAL EXAM
[Alert] : alert [Conjunctiva Non-injected] : conjunctiva non-injected [No Edema] : no edema [Face] : Face [Nose] : Nose [Back] : Back [Buttock] : Buttock [R Arm] : R Arm [L Arm] : L Arm [R Leg] : R Leg [L Leg] : L Leg [FreeTextEntry3] : LLE- 1x2 cm woun- healing with mild erythema at the border, not c/w cellulitis \par \par lateral nose-.5 cm ulcerated lesion  \par \par R and L arm- multiple SKs  \par \par \par \par

## 2019-02-06 ENCOUNTER — APPOINTMENT (OUTPATIENT)
Dept: DERMATOLOGY | Facility: CLINIC | Age: 84
End: 2019-02-06
Payer: MEDICARE

## 2019-02-06 PROCEDURE — 99214 OFFICE O/P EST MOD 30 MIN: CPT | Mod: 24

## 2019-02-07 NOTE — PHYSICAL EXAM
[Alert] : alert [Well Nourished] : well nourished [Conjunctiva Non-injected] : conjunctiva non-injected [No Visual Lymphadenopathy] : no visual  lymphadenopathy [No Clubbing] : no clubbing [No Edema] : no edema [No Bromhidrosis] : no bromhidrosis [No Chromhidrosis] : no chromhidrosis [FreeTextEntry3] : - Pearly papule on left nasa ala, 0.6X0.4 cm

## 2019-02-07 NOTE — HISTORY OF PRESENT ILLNESS
[FreeTextEntry1] : Mohs consultation for nBCC on the nose [de-identified] : Mohs Consultation Date: 02/06/2019 \par \par Referral by: Dr. Amirah Louise\par \par 89 y/o F here for Mohs surgery consultation for biopsy proven nBCC on the left nasal ala. Crusting and present for at least months prior to biopsy. Pt also has a nodule on the arm which she will be evaluated by plastic surgery for.\par \par History of skin cancer: atypical squamous proliferation vs. SCC s/p ED&C X 2 06/2018 by Dr. Darcie Goldberg\par \par SH: is bedbound mostly at home, able to transfer some of the time. non-verbal 2/2 dementia. HCP is her son Tay Hampton. \par \par Pt's son not present as appt today but had extensive conversation via the phone\par \par Pertinent positives noted below\par History of HIV or hepatitis: N\par Blood thinners: N\par Antibiotic prophylaxis: N\par Medical implants: N

## 2019-02-08 ENCOUNTER — APPOINTMENT (OUTPATIENT)
Dept: GERIATRICS | Facility: CLINIC | Age: 84
End: 2019-02-08
Payer: MEDICARE

## 2019-02-08 VITALS
DIASTOLIC BLOOD PRESSURE: 70 MMHG | OXYGEN SATURATION: 99 % | HEIGHT: 66 IN | SYSTOLIC BLOOD PRESSURE: 110 MMHG | TEMPERATURE: 97.6 F | RESPIRATION RATE: 15 BRPM | HEART RATE: 62 BPM

## 2019-02-08 DIAGNOSIS — C44.91 BASAL CELL CARCINOMA OF SKIN, UNSPECIFIED: ICD-10-CM

## 2019-02-08 DIAGNOSIS — R53.1 WEAKNESS: ICD-10-CM

## 2019-02-08 PROCEDURE — 99215 OFFICE O/P EST HI 40 MIN: CPT | Mod: GC

## 2019-02-08 RX ORDER — TRIAMCINOLONE ACETONIDE 1 MG/G
0.1 OINTMENT TOPICAL
Qty: 1 | Refills: 0 | Status: DISCONTINUED | COMMUNITY
Start: 2018-08-17 | End: 2019-02-08

## 2019-02-08 NOTE — HISTORY OF PRESENT ILLNESS
[FreeTextEntry1] : 89yo female presented to the clinic accompanied by her son, Tay ([phone]), and HHADarling for follow up of dementia.\par \par Son and HHA concerned today that pt has been weaker - especially when transferring from chair to bed.  They have noted the decline has been gradual as opposed to sudden. Son is concerned that statin may be contributing.  Son wondering about PT as well \par \par Saw surgical dermatologist - has a BCC lesion - opted against surgery at this time\par \par No falls since last visit.  Denying any agitation, decreased appetite, choking spells.  HHA states she sleeps sometimes throughout the day and for the most part at night.\par \par

## 2019-02-08 NOTE — PHYSICAL EXAM
[General Appearance - Alert] : alert [General Appearance - In No Acute Distress] : in no acute distress [Sclera] : the sclera and conjunctiva were normal [PERRL With Normal Accommodation] : pupils were equal in size, round, and reactive to light [Extraocular Movements] : extraocular movements were intact [Neck Appearance] : the appearance of the neck was normal [Neck Cervical Mass (___cm)] : no neck mass was observed [Jugular Venous Distention Increased] : there was no jugular-venous distention [Thyroid Diffuse Enlargement] : the thyroid was not enlarged [Thyroid Nodule] : there were no palpable thyroid nodules [Auscultation Breath Sounds / Voice Sounds] : lungs were clear to auscultation bilaterally [Heart Rate And Rhythm] : heart rate was normal and rhythm regular [Heart Sounds] : normal S1 and S2 [Heart Sounds Gallop] : no gallops [Murmurs] : no murmurs [Heart Sounds Pericardial Friction Rub] : no pericardial rub [Full Pulse] : the pedal pulses are present [Edema] : there was no peripheral edema [Bowel Sounds] : normal bowel sounds [Abdomen Soft] : soft [Abdomen Tenderness] : non-tender [] : no hepato-splenomegaly [Abdomen Mass (___ Cm)] : no abdominal mass palpated [No CVA Tenderness] : no ~M costovertebral angle tenderness [No Spinal Tenderness] : no spinal tenderness [Outer Ear] : the ears and nose were normal in appearance [Oropharynx] : the oropharynx was normal [FreeTextEntry1] : Left nasal bridge surgical incision site; clean dry and intact without surrounding erythema or discharge

## 2019-02-08 NOTE — REASON FOR VISIT
[Acute] : an acute visit [Formal Caregiver] : formal caregiver [Family Member] : family member [Other: _____] : [unfilled]

## 2019-02-08 NOTE — END OF VISIT
[FreeTextEntry3] : 89yo woman brought by son, Tay ([phone]), and [facility] for follow up of dementia. She is progressing and getting more difficult to move. She has been on statins on 20mg Simvastatin. No acute event. She did have pacemaker interrogation last year, Tay is questioning whether or not to do it again. They also saw Dr Denis for "low level urine infection" but suggested no further diagnosis and treatment. \par She has HHA 24/7  Saw surgical dermatologist - has a BCC lesion - opted against surgery at this time. No falls since last visit. Denying any agitation, decreased appetite, choking spells. HHA states she sleeps sometimes throughout the day and for the most part at night.\par PE alert in no distress, but keeps her eyes close. Lungs clear to A&P, herat RSR, no edma.\par Plan: Long discussion with Tay re goals of life, discussed management of constipation with Miralax. \par

## 2019-02-11 ENCOUNTER — MOBILE ON CALL (OUTPATIENT)
Age: 84
End: 2019-02-11

## 2019-02-21 ENCOUNTER — APPOINTMENT (OUTPATIENT)
Dept: PLASTIC SURGERY | Facility: CLINIC | Age: 84
End: 2019-02-21

## 2019-05-08 ENCOUNTER — APPOINTMENT (OUTPATIENT)
Dept: GERIATRICS | Facility: CLINIC | Age: 84
End: 2019-05-08
Payer: MEDICARE

## 2019-05-08 VITALS
DIASTOLIC BLOOD PRESSURE: 60 MMHG | OXYGEN SATURATION: 96 % | WEIGHT: 192 LBS | SYSTOLIC BLOOD PRESSURE: 100 MMHG | TEMPERATURE: 97.6 F | HEART RATE: 67 BPM | BODY MASS INDEX: 30.99 KG/M2

## 2019-05-08 DIAGNOSIS — R00.1 BRADYCARDIA, UNSPECIFIED: ICD-10-CM

## 2019-05-08 DIAGNOSIS — R32 UNSPECIFIED URINARY INCONTINENCE: ICD-10-CM

## 2019-05-08 DIAGNOSIS — N39.0 URINARY TRACT INFECTION, SITE NOT SPECIFIED: ICD-10-CM

## 2019-05-08 PROCEDURE — 99215 OFFICE O/P EST HI 40 MIN: CPT | Mod: GC

## 2019-05-08 RX ORDER — SIMVASTATIN 20 MG/1
20 TABLET, FILM COATED ORAL
Qty: 90 | Refills: 3 | Status: DISCONTINUED | COMMUNITY
Start: 2018-07-10 | End: 2019-05-08

## 2019-05-08 RX ORDER — LORATADINE 10 MG
17 TABLET,DISINTEGRATING ORAL
Refills: 0 | Status: DISCONTINUED | COMMUNITY
End: 2019-05-08

## 2019-05-09 PROBLEM — R32 INCONTINENCE: Status: ACTIVE | Noted: 2019-05-09

## 2019-05-10 NOTE — END OF VISIT
[] : Resident [FreeTextEntry3] : 89 yo woman brought in by his devoted son Tay ([phone]) and their 24/7 aide Jane. She has been quite stable, at home and Tay is most keen on keeping her home at all costs. She does have 24/7 but it is getting more difficult to transfer her, and a PT recently suggested Colton lift. PMH: dementia, atrial fibrillation, SCC and BCC Tay also wants a prescription for appropriate type of transport/wheelchair.\par On PE she is quiet, in no distress, calluses on bilateral feet. doesn't speak.\par Plan; Will order wheelchair, not Colton lift which is problematic fro safety if only 1 person available.  \par \par ROS: No fevers, or chills, good appetite. No recent falls or hospitalizations

## 2019-05-10 NOTE — ASSESSMENT
[FreeTextEntry1] : 89 yo F PMHx dementia, atrial fibrillation, SCC and BCC who presents for follow-up. She is brought in by son Tay ([phone]) and [facility].\par \par # Advanced dementia: with difficulty of transferring\par - Will need appropriate wheelchair \par - No sacral or heel ulcers, or recent hospitalizations or falls\par - Continue physical therapy\par \par # Urinary incontinence\par - She needs additional diapers and incontinence supplies \par \par # Toe calluses:\par - Will need close follow-up with podiatry\par \par D/w Dr. Alegria

## 2019-05-10 NOTE — HISTORY OF PRESENT ILLNESS
[FreeTextEntry1] : 89 yo F PMHx dementia, atrial fibrillation, SCC and BCC who presents for follow-up. She is brought in by son Tay ([phone]) and HHA.\par \par Son is concerned about transferring mother from bed to chair and appropriate type of transport/wheelchair. She has a 24/7 HHA. She has been working with physical therapy, but at home the concern is of continued safe transferring. \par \par No ulcers, but calluses on bilateral feet. Has one large bowel movement every 5-7 days. Has stopped using miralax. Has been receiving one ensure a day. She is very stubborn, does not want to cooperate. Infrequently speaks, often sleeps during the day. No coughing with eating, she has good appetite. \par \par ROS: No fevers, or chills, good appetite. No recent falls or hospitalizations

## 2019-05-10 NOTE — PHYSICAL EXAM
[General Appearance - Alert] : alert [General Appearance - In No Acute Distress] : in no acute distress [General Appearance - Well Nourished] : well nourished [General Appearance - Well-Appearing] : healthy appearing [General Appearance - Well Developed] : well developed [Sclera] : the sclera and conjunctiva were normal [] : no respiratory distress [Edema] : there was no peripheral edema [Abdomen Tenderness] : non-tender [Abdomen Soft] : soft [FreeTextEntry1] : B/l fourth toe with callus, heels without ulcers, no sacral ulcer (mild redness and no opening)

## 2019-05-15 ENCOUNTER — APPOINTMENT (OUTPATIENT)
Dept: DERMATOLOGY | Facility: CLINIC | Age: 84
End: 2019-05-15
Payer: MEDICARE

## 2019-05-15 DIAGNOSIS — B37.2 CANDIDIASIS OF SKIN AND NAIL: ICD-10-CM

## 2019-05-15 PROCEDURE — 17271 DSTR MAL LES S/N/H/F/G 0.6-1: CPT | Mod: GC

## 2019-05-15 PROCEDURE — 99214 OFFICE O/P EST MOD 30 MIN: CPT | Mod: 25,GC

## 2019-05-15 RX ORDER — NYSTATIN 100000 1/G
100000 POWDER TOPICAL TWICE DAILY
Qty: 1 | Refills: 3 | Status: ACTIVE | COMMUNITY
Start: 2018-07-06 | End: 1900-01-01

## 2019-08-16 ENCOUNTER — APPOINTMENT (OUTPATIENT)
Dept: GERIATRICS | Facility: CLINIC | Age: 84
End: 2019-08-16
Payer: MEDICARE

## 2019-08-16 VITALS
WEIGHT: 165 LBS | HEIGHT: 66 IN | DIASTOLIC BLOOD PRESSURE: 70 MMHG | TEMPERATURE: 98.7 F | RESPIRATION RATE: 16 BRPM | SYSTOLIC BLOOD PRESSURE: 130 MMHG | HEART RATE: 73 BPM | BODY MASS INDEX: 26.52 KG/M2 | OXYGEN SATURATION: 99 %

## 2019-08-16 PROCEDURE — 99215 OFFICE O/P EST HI 40 MIN: CPT

## 2019-08-16 NOTE — SOCIAL HISTORY
[# of Members in Household ___] : [unfilled] Members in Household [Smoke Detector] : smoke detector [Carbon Monoxide Detector] : carbon monoxide detector [Safety elements used in home] : safety elements used in home [Shower Chair] : shower chair [Grab Bars] : grab bars [Seat Belt] :  uses seat belt [Anti-Slip Measures] : anti-slip measures [Night Light] : night light [FreeTextEntry1] : Has 24/7 help [FreeTextEntry2] : excellent [FreeTextEntry3] : none [Guns at Home] : no guns at home [FreeTextEntry4] : None [Sunscreen] : does not use sunscreen [Travel to Developing Areas] : does not  travel to developing areas [TB Exposure] : no exposure to tuberculosis [Caregiver Concerns] : no caregiver concerns [Driving] : not driving [de-identified] : Needs total help [de-identified] : Needs total ehlp [de-identified] : Rico

## 2019-08-16 NOTE — PHYSICAL EXAM
[General Appearance - Alert] : alert [General Appearance - In No Acute Distress] : in no acute distress [Sclera] : the sclera and conjunctiva were normal [Extraocular Movements] : extraocular movements were intact [PERRL With Normal Accommodation] : pupils were equal in size, round, and reactive to light [No Oral Pallor] : no oral pallor [Normal Oral Mucosa] : normal oral mucosa [No Oral Cyanosis] : no oral cyanosis [Outer Ear] : the ears and nose were normal in appearance [Oropharynx] : The oropharynx was normal [Neck Appearance] : the appearance of the neck was normal [Neck Cervical Mass (___cm)] : no neck mass was observed [Thyroid Diffuse Enlargement] : the thyroid was not enlarged [Jugular Venous Distention Increased] : there was no jugular-venous distention [Auscultation Breath Sounds / Voice Sounds] : lungs were clear to auscultation bilaterally [Thyroid Nodule] : there were no palpable thyroid nodules [Heart Sounds] : normal S1 and S2 [Heart Rate And Rhythm] : heart rate was normal and rhythm regular [Heart Sounds Gallop] : no gallops [Murmurs] : no murmurs [Breast Appearance] : normal in appearance [Heart Sounds Pericardial Friction Rub] : no pericardial rub [Breast Palpation Mass] : no palpable masses [Bowel Sounds] : normal bowel sounds [Abdomen Soft] : soft [Abdomen Tenderness] : non-tender [Abdomen Mass (___ Cm)] : no abdominal mass palpated [No CVA Tenderness] : no ~M costovertebral angle tenderness [Abnormal Walk] : normal gait [No Spinal Tenderness] : no spinal tenderness [Musculoskeletal - Swelling] : no joint swelling seen [Motor Tone] : muscle strength and tone were normal [Nail Clubbing] : no clubbing  or cyanosis of the fingernails [Skin Color & Pigmentation] : normal skin color and pigmentation [Skin Turgor] : normal skin turgor [Deep Tendon Reflexes (DTR)] : deep tendon reflexes were 2+ and symmetric [] : no rash [Sensation] : the sensory exam was normal to light touch and pinprick [No Focal Deficits] : no focal deficits

## 2019-08-16 NOTE — HISTORY OF PRESENT ILLNESS
[Severe] : Stage: Severe [Worse] : Status: Worse [Memory Lapses Or Loss] : stable memory impairment [Hostility Toward Caregivers] : stable aggression [Patient Observed To Be Agitated] : stable agitation [Sleep Disturbances] : stable sleep disturbances [] : stable wandering [0] : 2) Feeling down, depressed, or hopeless: Not at all [PHQ-2 Score ___] : PHQ-2 Score [unfilled] [FreeTextEntry1] : 89yo woman brought back by her son who has been quite attentive to her needs. she has developed on both feet bunions. Followed up with dr Irving who ordered new [employer] shoes, and the lesions have completely healed. She is also followed by retinology- rosie.  No injections needed. Had dentist one month ago, all stable. Will see dermatologist next month.\par Patient is eating [employer] chicken! Son adds "as long as she is eating, we are fine !" Weight stable at 166lbs.

## 2019-09-05 ENCOUNTER — APPOINTMENT (OUTPATIENT)
Dept: DERMATOLOGY | Facility: CLINIC | Age: 84
End: 2019-09-05
Payer: MEDICARE

## 2019-09-05 DIAGNOSIS — L82.1 OTHER SEBORRHEIC KERATOSIS: ICD-10-CM

## 2019-09-05 DIAGNOSIS — D22.9 MELANOCYTIC NEVI, UNSPECIFIED: ICD-10-CM

## 2019-09-05 DIAGNOSIS — L57.0 ACTINIC KERATOSIS: ICD-10-CM

## 2019-09-05 PROCEDURE — 99213 OFFICE O/P EST LOW 20 MIN: CPT | Mod: 25,GC

## 2019-09-05 PROCEDURE — 17004 DESTROY PREMAL LESIONS 15/>: CPT | Mod: 59,GC

## 2019-09-05 PROCEDURE — 17270 DSTR MAL LES S/N/H/F/G .5 /<: CPT | Mod: 59,GC

## 2019-09-19 LAB
ALBUMIN SERPL ELPH-MCNC: 4.3 G/DL
ALP BLD-CCNC: 97 U/L
ALT SERPL-CCNC: 13 U/L
ANION GAP SERPL CALC-SCNC: 14 MMOL/L
AST SERPL-CCNC: 14 U/L
BASOPHILS # BLD AUTO: 0.04 K/UL
BASOPHILS NFR BLD AUTO: 0.4 %
BILIRUB SERPL-MCNC: 0.3 MG/DL
BUN SERPL-MCNC: 41 MG/DL
CALCIUM SERPL-MCNC: 9.9 MG/DL
CHLORIDE SERPL-SCNC: 105 MMOL/L
CO2 SERPL-SCNC: 23 MMOL/L
CREAT SERPL-MCNC: 1.42 MG/DL
EOSINOPHIL # BLD AUTO: 0.16 K/UL
EOSINOPHIL NFR BLD AUTO: 1.6 %
GLUCOSE SERPL-MCNC: 131 MG/DL
HCT VFR BLD CALC: 46.7 %
HGB BLD-MCNC: 14.9 G/DL
IMM GRANULOCYTES NFR BLD AUTO: 0.3 %
LYMPHOCYTES # BLD AUTO: 2.74 K/UL
LYMPHOCYTES NFR BLD AUTO: 26.8 %
MAN DIFF?: NORMAL
MCHC RBC-ENTMCNC: 30.1 PG
MCHC RBC-ENTMCNC: 31.9 GM/DL
MCV RBC AUTO: 94.3 FL
MONOCYTES # BLD AUTO: 0.58 K/UL
MONOCYTES NFR BLD AUTO: 5.7 %
NEUTROPHILS # BLD AUTO: 6.68 K/UL
NEUTROPHILS NFR BLD AUTO: 65.2 %
PLATELET # BLD AUTO: 236 K/UL
POTASSIUM SERPL-SCNC: 4.5 MMOL/L
PROT SERPL-MCNC: 7.4 G/DL
RBC # BLD: 4.95 M/UL
RBC # FLD: 12.8 %
SODIUM SERPL-SCNC: 142 MMOL/L
WBC # FLD AUTO: 10.23 K/UL

## 2019-11-20 ENCOUNTER — APPOINTMENT (OUTPATIENT)
Dept: GERIATRICS | Facility: CLINIC | Age: 84
End: 2019-11-20
Payer: MEDICARE

## 2019-11-20 VITALS
HEIGHT: 66 IN | TEMPERATURE: 97.6 F | SYSTOLIC BLOOD PRESSURE: 120 MMHG | DIASTOLIC BLOOD PRESSURE: 60 MMHG | WEIGHT: 167.13 LBS | BODY MASS INDEX: 26.86 KG/M2 | RESPIRATION RATE: 14 BRPM

## 2019-11-20 DIAGNOSIS — E78.5 HYPERLIPIDEMIA, UNSPECIFIED: ICD-10-CM

## 2019-11-20 DIAGNOSIS — L89.90 PRESSURE ULCER OF UNSPECIFIED SITE, UNSPECIFIED STAGE: ICD-10-CM

## 2019-11-20 DIAGNOSIS — F03.90 UNSPECIFIED DEMENTIA W/OUT BEHAVIORAL DISTURBANCE: ICD-10-CM

## 2019-11-20 DIAGNOSIS — I48.91 UNSPECIFIED ATRIAL FIBRILLATION: ICD-10-CM

## 2019-11-20 PROCEDURE — 99215 OFFICE O/P EST HI 40 MIN: CPT

## 2019-11-20 NOTE — HISTORY OF PRESENT ILLNESS
[FreeTextEntry1] : 89yo woman brought back by her son  Tay ( HCP:  [phone]), and her aide Gertrudis 24/7  and Susy for 3 days. Patient has been quite stable- only issue is persistent lesion on Rt arm, which is being followed by dermatology.\par I reviewed labs taken last month - all normal. She has also seen the retinal specialist - no need for further injections in the past 2 years.  Did see Dr Irving .\par Uses Miralax q 3 days for constipation "That helps a lot"\par Her sacral ulcer is very superficial at this time. She has a ROHO cushion in wheelchair. and she has a hospital bed, as she is a full assist in all transfers and ADLs.\par Tay is now considering Home Physician visits because it has become almost impossible to transfer her in and out of the car [employer]\par  [Severe] : Stage: Severe [Stable] : Status: Stable [Memory Lapses Or Loss] : stable memory impairment [Patient Observed To Be Agitated] : denies agitation [Hostility Toward Caregivers] : denies aggression [] : denies wandering [Sleep Disturbances] : denies sleep disturbances [de-identified] : Patient has advanced dementia and is essentially bedbound/wheelchair bound. Her son calls her every night and she has the same limited repetitive conversation, speaking of an aide she had several years ago..

## 2019-11-20 NOTE — PHYSICAL EXAM
[PERRL With Normal Accommodation] : pupils were equal in size, round, and reactive to light [Sclera] : the sclera and conjunctiva were normal [Extraocular Movements] : extraocular movements were intact [Normal Oral Mucosa] : normal oral mucosa [No Oral Pallor] : no oral pallor [No Oral Cyanosis] : no oral cyanosis [Outer Ear] : the ears and nose were normal in appearance [Oropharynx] : The oropharynx was normal [Neck Appearance] : the appearance of the neck was normal [Neck Cervical Mass (___cm)] : no neck mass was observed [Jugular Venous Distention Increased] : there was no jugular-venous distention [Thyroid Nodule] : there were no palpable thyroid nodules [Thyroid Diffuse Enlargement] : the thyroid was not enlarged [Auscultation Breath Sounds / Voice Sounds] : lungs were clear to auscultation bilaterally [Breast Appearance] : normal in appearance [Breast Palpation Mass] : no palpable masses [Bowel Sounds] : normal bowel sounds [Abdomen Soft] : soft [Abdomen Tenderness] : non-tender [] : no hepato-splenomegaly [Abdomen Mass (___ Cm)] : no abdominal mass palpated [FreeTextEntry1] : patient is very lethargic

## 2019-11-20 NOTE — END OF VISIT
[FreeTextEntry3] : 89yo woman brought back by her son  Tay ( HCP:  [phone]), and her aide Gertrudis 24/7  and Susy for 3 days. Thier are 2 other siblings alive but estranged.  Main concern today is patient becoming increasingly wheelchair/bedbound, no longer able to stand. No acute concern except for lesion on RT arm, slow growing. Did consult with Mohs surgery but in view of slow ggrowing Tay is opting for watchful treatment.\par PE: Sacral ulcer is very superficial and ~ 0.3cm in diameter, no odor. Her sacral ulcer is very superficial at this time.\par Plan: Discussed with Tay transferring care to Home visits and eventually to Hospice. Tay is very repetitive and appreciate.

## 2019-11-20 NOTE — SOCIAL HISTORY
[FreeTextEntry4] : none [FreeTextEntry1] : Carlos Alberto [Smoke Detector] : smoke detector [No falls in past year] : Patient reported no falls in the past year [Carbon Monoxide Detector] : carbon monoxide detector [Guns at Home] : no guns at home [Grab Bars] : grab bars [Shower Chair] : shower chair [Safety elements used in home] : safety elements used in home [de-identified] : wheelchair [Night Light] : night light

## 2019-12-11 ENCOUNTER — LABORATORY RESULT (OUTPATIENT)
Age: 84
End: 2019-12-11

## 2019-12-11 ENCOUNTER — APPOINTMENT (OUTPATIENT)
Dept: DERMATOLOGY | Facility: CLINIC | Age: 84
End: 2019-12-11
Payer: MEDICARE

## 2019-12-11 DIAGNOSIS — L30.9 DERMATITIS, UNSPECIFIED: ICD-10-CM

## 2019-12-11 DIAGNOSIS — L82.0 INFLAMED SEBORRHEIC KERATOSIS: ICD-10-CM

## 2019-12-11 DIAGNOSIS — L98.9 DISORDER OF THE SKIN AND SUBCUTANEOUS TISSUE, UNSPECIFIED: ICD-10-CM

## 2019-12-11 DIAGNOSIS — C44.311 BASAL CELL CARCINOMA OF SKIN OF NOSE: ICD-10-CM

## 2019-12-11 DIAGNOSIS — D48.5 NEOPLASM OF UNCERTAIN BEHAVIOR OF SKIN: ICD-10-CM

## 2019-12-11 PROCEDURE — 99213 OFFICE O/P EST LOW 20 MIN: CPT | Mod: GC,25

## 2019-12-11 PROCEDURE — 17281 DSTR MAL LS F/E/E/N/L/M .6-1: CPT | Mod: GC,59

## 2019-12-11 PROCEDURE — 17110 DESTRUCTION B9 LES UP TO 14: CPT | Mod: GC,59

## 2019-12-11 PROCEDURE — 11102 TANGNTL BX SKIN SINGLE LES: CPT | Mod: GC,59

## 2019-12-11 RX ORDER — TRIAMCINOLONE ACETONIDE 1 MG/G
0.1 OINTMENT TOPICAL
Qty: 1 | Refills: 1 | Status: ACTIVE | COMMUNITY
Start: 2019-12-11 | End: 1900-01-01

## 2019-12-11 RX ORDER — VALACYCLOVIR 1 G/1
1 TABLET, FILM COATED ORAL 3 TIMES DAILY
Qty: 21 | Refills: 0 | Status: ACTIVE | COMMUNITY
Start: 2019-12-11 | End: 1900-01-01

## 2019-12-14 ENCOUNTER — INPATIENT (INPATIENT)
Facility: HOSPITAL | Age: 84
LOS: 4 days | DRG: 870 | End: 2019-12-19
Attending: INTERNAL MEDICINE | Admitting: STUDENT IN AN ORGANIZED HEALTH CARE EDUCATION/TRAINING PROGRAM
Payer: MEDICARE

## 2019-12-14 VITALS
SYSTOLIC BLOOD PRESSURE: 103 MMHG | DIASTOLIC BLOOD PRESSURE: 70 MMHG | WEIGHT: 154.98 LBS | HEART RATE: 70 BPM | TEMPERATURE: 97 F | RESPIRATION RATE: 18 BRPM | OXYGEN SATURATION: 99 %

## 2019-12-14 DIAGNOSIS — A41.9 SEPSIS, UNSPECIFIED ORGANISM: ICD-10-CM

## 2019-12-14 LAB
ALBUMIN SERPL ELPH-MCNC: 2.7 G/DL — LOW (ref 3.3–5)
ALBUMIN SERPL ELPH-MCNC: 3.6 G/DL — SIGNIFICANT CHANGE UP (ref 3.3–5)
ALP SERPL-CCNC: 73 U/L — SIGNIFICANT CHANGE UP (ref 40–120)
ALP SERPL-CCNC: 87 U/L — SIGNIFICANT CHANGE UP (ref 40–120)
ALT FLD-CCNC: 117 U/L — HIGH (ref 10–45)
ALT FLD-CCNC: 577 U/L — HIGH (ref 10–45)
ANION GAP SERPL CALC-SCNC: 10 MMOL/L — SIGNIFICANT CHANGE UP (ref 5–17)
ANION GAP SERPL CALC-SCNC: 23 MMOL/L — HIGH (ref 5–17)
ANION GAP SERPL CALC-SCNC: 25 MMOL/L — HIGH (ref 5–17)
ANISOCYTOSIS BLD QL: SLIGHT — SIGNIFICANT CHANGE UP
AST SERPL-CCNC: 298 U/L — HIGH (ref 10–40)
AST SERPL-CCNC: 789 U/L — HIGH (ref 10–40)
BASOPHILS # BLD AUTO: 0.09 K/UL — SIGNIFICANT CHANGE UP (ref 0–0.2)
BASOPHILS # BLD AUTO: 0.19 K/UL — SIGNIFICANT CHANGE UP (ref 0–0.2)
BASOPHILS NFR BLD AUTO: 0.4 % — SIGNIFICANT CHANGE UP (ref 0–2)
BASOPHILS NFR BLD AUTO: 1 % — SIGNIFICANT CHANGE UP (ref 0–2)
BILIRUB SERPL-MCNC: 0.7 MG/DL — SIGNIFICANT CHANGE UP (ref 0.2–1.2)
BILIRUB SERPL-MCNC: 0.8 MG/DL — SIGNIFICANT CHANGE UP (ref 0.2–1.2)
BUN SERPL-MCNC: 17 MG/DL — SIGNIFICANT CHANGE UP (ref 7–23)
BUN SERPL-MCNC: 44 MG/DL — HIGH (ref 7–23)
BUN SERPL-MCNC: 45 MG/DL — HIGH (ref 7–23)
BURR CELLS BLD QL SMEAR: PRESENT — SIGNIFICANT CHANGE UP
CALCIUM SERPL-MCNC: 8.5 MG/DL — SIGNIFICANT CHANGE UP (ref 8.4–10.5)
CALCIUM SERPL-MCNC: 9.3 MG/DL — SIGNIFICANT CHANGE UP (ref 8.4–10.5)
CALCIUM SERPL-MCNC: <3 MG/DL — CRITICAL LOW (ref 8.4–10.5)
CHLORIDE SERPL-SCNC: 101 MMOL/L — SIGNIFICANT CHANGE UP (ref 96–108)
CHLORIDE SERPL-SCNC: 132 MMOL/L — HIGH (ref 96–108)
CHLORIDE SERPL-SCNC: 96 MMOL/L — SIGNIFICANT CHANGE UP (ref 96–108)
CO2 SERPL-SCNC: 7 MMOL/L — CRITICAL LOW (ref 22–31)
CO2 SERPL-SCNC: 7 MMOL/L — CRITICAL LOW (ref 22–31)
CO2 SERPL-SCNC: <5 MMOL/L — CRITICAL LOW (ref 22–31)
CREAT SERPL-MCNC: 0.73 MG/DL — SIGNIFICANT CHANGE UP (ref 0.5–1.3)
CREAT SERPL-MCNC: 2.27 MG/DL — HIGH (ref 0.5–1.3)
CREAT SERPL-MCNC: 2.4 MG/DL — HIGH (ref 0.5–1.3)
EOSINOPHIL # BLD AUTO: 0 K/UL — SIGNIFICANT CHANGE UP (ref 0–0.5)
EOSINOPHIL # BLD AUTO: 0.01 K/UL — SIGNIFICANT CHANGE UP (ref 0–0.5)
EOSINOPHIL NFR BLD AUTO: 0 % — SIGNIFICANT CHANGE UP (ref 0–6)
EOSINOPHIL NFR BLD AUTO: 0 % — SIGNIFICANT CHANGE UP (ref 0–6)
GAS PNL BLDA: SIGNIFICANT CHANGE UP
GAS PNL BLDV: SIGNIFICANT CHANGE UP
GLUCOSE SERPL-MCNC: 208 MG/DL — HIGH (ref 70–99)
GLUCOSE SERPL-MCNC: 448 MG/DL — HIGH (ref 70–99)
GLUCOSE SERPL-MCNC: 473 MG/DL — CRITICAL HIGH (ref 70–99)
HCT VFR BLD CALC: 42.4 % — SIGNIFICANT CHANGE UP (ref 34.5–45)
HCT VFR BLD CALC: 50.3 % — HIGH (ref 34.5–45)
HGB BLD-MCNC: 12.8 G/DL — SIGNIFICANT CHANGE UP (ref 11.5–15.5)
HGB BLD-MCNC: 15.5 G/DL — SIGNIFICANT CHANGE UP (ref 11.5–15.5)
IMM GRANULOCYTES NFR BLD AUTO: 1.2 % — SIGNIFICANT CHANGE UP (ref 0–1.5)
LIDOCAIN IGE QN: 6 U/L — LOW (ref 7–60)
LYMPHOCYTES # BLD AUTO: 1.71 K/UL — SIGNIFICANT CHANGE UP (ref 1–3.3)
LYMPHOCYTES # BLD AUTO: 1.77 K/UL — SIGNIFICANT CHANGE UP (ref 1–3.3)
LYMPHOCYTES # BLD AUTO: 8.6 % — LOW (ref 13–44)
LYMPHOCYTES # BLD AUTO: 9 % — LOW (ref 13–44)
MACROCYTES BLD QL: SLIGHT — SIGNIFICANT CHANGE UP
MANUAL SMEAR VERIFICATION: SIGNIFICANT CHANGE UP
MCHC RBC-ENTMCNC: 29.8 PG — SIGNIFICANT CHANGE UP (ref 27–34)
MCHC RBC-ENTMCNC: 30.2 GM/DL — LOW (ref 32–36)
MCHC RBC-ENTMCNC: 30.8 GM/DL — LOW (ref 32–36)
MCHC RBC-ENTMCNC: 30.8 PG — SIGNIFICANT CHANGE UP (ref 27–34)
MCV RBC AUTO: 98.6 FL — SIGNIFICANT CHANGE UP (ref 80–100)
MCV RBC AUTO: 99.8 FL — SIGNIFICANT CHANGE UP (ref 80–100)
MICROCYTES BLD QL: SLIGHT — SIGNIFICANT CHANGE UP
MONOCYTES # BLD AUTO: 0.95 K/UL — HIGH (ref 0–0.9)
MONOCYTES # BLD AUTO: 1.24 K/UL — HIGH (ref 0–0.9)
MONOCYTES NFR BLD AUTO: 5 % — SIGNIFICANT CHANGE UP (ref 2–14)
MONOCYTES NFR BLD AUTO: 6 % — SIGNIFICANT CHANGE UP (ref 2–14)
MYELOCYTES NFR BLD: 1 % — HIGH (ref 0–0)
NEUTROPHILS # BLD AUTO: 15.76 K/UL — HIGH (ref 1.8–7.4)
NEUTROPHILS # BLD AUTO: 17.3 K/UL — HIGH (ref 1.8–7.4)
NEUTROPHILS NFR BLD AUTO: 83 % — HIGH (ref 43–77)
NEUTROPHILS NFR BLD AUTO: 83.8 % — HIGH (ref 43–77)
NRBC # BLD: 0 /100 WBCS — SIGNIFICANT CHANGE UP (ref 0–0)
NRBC # BLD: 0 /100 — SIGNIFICANT CHANGE UP (ref 0–0)
PLAT MORPH BLD: ABNORMAL
PLATELET # BLD AUTO: 155 K/UL — SIGNIFICANT CHANGE UP (ref 150–400)
PLATELET # BLD AUTO: 160 K/UL — SIGNIFICANT CHANGE UP (ref 150–400)
POIKILOCYTOSIS BLD QL AUTO: SLIGHT — SIGNIFICANT CHANGE UP
POTASSIUM SERPL-MCNC: 5.8 MMOL/L — HIGH (ref 3.5–5.3)
POTASSIUM SERPL-MCNC: 9 MMOL/L — CRITICAL HIGH (ref 3.5–5.3)
POTASSIUM SERPL-MCNC: <2 MMOL/L — CRITICAL LOW (ref 3.5–5.3)
POTASSIUM SERPL-SCNC: 5.8 MMOL/L — HIGH (ref 3.5–5.3)
POTASSIUM SERPL-SCNC: 9 MMOL/L — CRITICAL HIGH (ref 3.5–5.3)
POTASSIUM SERPL-SCNC: <2 MMOL/L — CRITICAL LOW (ref 3.5–5.3)
PROT SERPL-MCNC: 5.7 G/DL — LOW (ref 6–8.3)
PROT SERPL-MCNC: 8 G/DL — SIGNIFICANT CHANGE UP (ref 6–8.3)
RAPID RVP RESULT: SIGNIFICANT CHANGE UP
RBC # BLD: 4.3 M/UL — SIGNIFICANT CHANGE UP (ref 3.8–5.2)
RBC # BLD: 5.04 M/UL — SIGNIFICANT CHANGE UP (ref 3.8–5.2)
RBC # FLD: 13.1 % — SIGNIFICANT CHANGE UP (ref 10.3–14.5)
RBC # FLD: 13.2 % — SIGNIFICANT CHANGE UP (ref 10.3–14.5)
RBC BLD AUTO: ABNORMAL
SODIUM SERPL-SCNC: 128 MMOL/L — LOW (ref 135–145)
SODIUM SERPL-SCNC: 131 MMOL/L — LOW (ref 135–145)
SODIUM SERPL-SCNC: 146 MMOL/L — HIGH (ref 135–145)
VARIANT LYMPHS # BLD: 1 % — SIGNIFICANT CHANGE UP (ref 0–6)
WBC # BLD: 18.99 K/UL — HIGH (ref 3.8–10.5)
WBC # BLD: 20.65 K/UL — HIGH (ref 3.8–10.5)
WBC # FLD AUTO: 18.99 K/UL — HIGH (ref 3.8–10.5)
WBC # FLD AUTO: 20.65 K/UL — HIGH (ref 3.8–10.5)

## 2019-12-14 PROCEDURE — 93010 ELECTROCARDIOGRAM REPORT: CPT

## 2019-12-14 PROCEDURE — 74176 CT ABD & PELVIS W/O CONTRAST: CPT | Mod: 26

## 2019-12-14 PROCEDURE — 93010 ELECTROCARDIOGRAM REPORT: CPT | Mod: 59,GC

## 2019-12-14 PROCEDURE — 31500 INSERT EMERGENCY AIRWAY: CPT | Mod: GC

## 2019-12-14 PROCEDURE — 99291 CRITICAL CARE FIRST HOUR: CPT | Mod: 25

## 2019-12-14 PROCEDURE — 99291 CRITICAL CARE FIRST HOUR: CPT | Mod: 25,GC

## 2019-12-14 PROCEDURE — 71045 X-RAY EXAM CHEST 1 VIEW: CPT | Mod: 26

## 2019-12-14 PROCEDURE — 71250 CT THORAX DX C-: CPT | Mod: 26

## 2019-12-14 RX ORDER — DEXTROSE 50 % IN WATER 50 %
12.5 SYRINGE (ML) INTRAVENOUS ONCE
Refills: 0 | Status: DISCONTINUED | OUTPATIENT
Start: 2019-12-14 | End: 2019-12-15

## 2019-12-14 RX ORDER — AZITHROMYCIN 500 MG/1
500 TABLET, FILM COATED ORAL EVERY 24 HOURS
Refills: 0 | Status: DISCONTINUED | OUTPATIENT
Start: 2019-12-15 | End: 2019-12-15

## 2019-12-14 RX ORDER — DEXTROSE 50 % IN WATER 50 %
25 SYRINGE (ML) INTRAVENOUS ONCE
Refills: 0 | Status: DISCONTINUED | OUTPATIENT
Start: 2019-12-14 | End: 2019-12-15

## 2019-12-14 RX ORDER — AZITHROMYCIN 500 MG/1
500 TABLET, FILM COATED ORAL ONCE
Refills: 0 | Status: COMPLETED | OUTPATIENT
Start: 2019-12-14 | End: 2019-12-14

## 2019-12-14 RX ORDER — CHLORHEXIDINE GLUCONATE 213 G/1000ML
15 SOLUTION TOPICAL EVERY 12 HOURS
Refills: 0 | Status: DISCONTINUED | OUTPATIENT
Start: 2019-12-14 | End: 2019-12-19

## 2019-12-14 RX ORDER — FENTANYL CITRATE 50 UG/ML
50 INJECTION INTRAVENOUS ONCE
Refills: 0 | Status: DISCONTINUED | OUTPATIENT
Start: 2019-12-14 | End: 2019-12-14

## 2019-12-14 RX ORDER — AZITHROMYCIN 500 MG/1
TABLET, FILM COATED ORAL
Refills: 0 | Status: DISCONTINUED | OUTPATIENT
Start: 2019-12-14 | End: 2019-12-15

## 2019-12-14 RX ORDER — INSULIN HUMAN 100 [IU]/ML
5 INJECTION, SOLUTION SUBCUTANEOUS ONCE
Refills: 0 | Status: DISCONTINUED | OUTPATIENT
Start: 2019-12-14 | End: 2019-12-14

## 2019-12-14 RX ORDER — PIPERACILLIN AND TAZOBACTAM 4; .5 G/20ML; G/20ML
3.38 INJECTION, POWDER, LYOPHILIZED, FOR SOLUTION INTRAVENOUS ONCE
Refills: 0 | Status: COMPLETED | OUTPATIENT
Start: 2019-12-14 | End: 2019-12-14

## 2019-12-14 RX ORDER — ACETAMINOPHEN 500 MG
650 TABLET ORAL ONCE
Refills: 0 | Status: COMPLETED | OUTPATIENT
Start: 2019-12-14 | End: 2019-12-14

## 2019-12-14 RX ORDER — DEXTROSE 50 % IN WATER 50 %
15 SYRINGE (ML) INTRAVENOUS ONCE
Refills: 0 | Status: DISCONTINUED | OUTPATIENT
Start: 2019-12-14 | End: 2019-12-15

## 2019-12-14 RX ORDER — NOREPINEPHRINE BITARTRATE/D5W 8 MG/250ML
0.1 PLASTIC BAG, INJECTION (ML) INTRAVENOUS
Qty: 8 | Refills: 0 | Status: DISCONTINUED | OUTPATIENT
Start: 2019-12-14 | End: 2019-12-17

## 2019-12-14 RX ORDER — VANCOMYCIN HCL 1 G
1000 VIAL (EA) INTRAVENOUS ONCE
Refills: 0 | Status: DISCONTINUED | OUTPATIENT
Start: 2019-12-14 | End: 2019-12-15

## 2019-12-14 RX ORDER — INSULIN LISPRO 100/ML
VIAL (ML) SUBCUTANEOUS
Refills: 0 | Status: DISCONTINUED | OUTPATIENT
Start: 2019-12-14 | End: 2019-12-15

## 2019-12-14 RX ORDER — ATORVASTATIN CALCIUM 80 MG/1
1 TABLET, FILM COATED ORAL
Qty: 0 | Refills: 0 | DISCHARGE

## 2019-12-14 RX ORDER — ROCURONIUM BROMIDE 10 MG/ML
80 VIAL (ML) INTRAVENOUS ONCE
Refills: 0 | Status: COMPLETED | OUTPATIENT
Start: 2019-12-14 | End: 2019-12-14

## 2019-12-14 RX ORDER — ALBUTEROL 90 UG/1
10 AEROSOL, METERED ORAL ONCE
Refills: 0 | Status: DISCONTINUED | OUTPATIENT
Start: 2019-12-14 | End: 2019-12-14

## 2019-12-14 RX ORDER — PROPOFOL 10 MG/ML
10 INJECTION, EMULSION INTRAVENOUS
Qty: 500 | Refills: 0 | Status: DISCONTINUED | OUTPATIENT
Start: 2019-12-14 | End: 2019-12-17

## 2019-12-14 RX ORDER — SODIUM CHLORIDE 9 MG/ML
1000 INJECTION, SOLUTION INTRAVENOUS
Refills: 0 | Status: DISCONTINUED | OUTPATIENT
Start: 2019-12-14 | End: 2019-12-15

## 2019-12-14 RX ORDER — NOREPINEPHRINE BITARTRATE/D5W 8 MG/250ML
0.05 PLASTIC BAG, INJECTION (ML) INTRAVENOUS
Qty: 8 | Refills: 0 | Status: DISCONTINUED | OUTPATIENT
Start: 2019-12-14 | End: 2019-12-15

## 2019-12-14 RX ORDER — KETAMINE HYDROCHLORIDE 100 MG/ML
100 INJECTION INTRAMUSCULAR; INTRAVENOUS ONCE
Refills: 0 | Status: DISCONTINUED | OUTPATIENT
Start: 2019-12-14 | End: 2019-12-14

## 2019-12-14 RX ORDER — DEXTROSE 50 % IN WATER 50 %
50 SYRINGE (ML) INTRAVENOUS ONCE
Refills: 0 | Status: DISCONTINUED | OUTPATIENT
Start: 2019-12-14 | End: 2019-12-14

## 2019-12-14 RX ORDER — SODIUM CHLORIDE 9 MG/ML
2200 INJECTION INTRAMUSCULAR; INTRAVENOUS; SUBCUTANEOUS ONCE
Refills: 0 | Status: COMPLETED | OUTPATIENT
Start: 2019-12-14 | End: 2019-12-14

## 2019-12-14 RX ORDER — CHLORHEXIDINE GLUCONATE 213 G/1000ML
1 SOLUTION TOPICAL
Refills: 0 | Status: DISCONTINUED | OUTPATIENT
Start: 2019-12-14 | End: 2019-12-18

## 2019-12-14 RX ORDER — CALCIUM GLUCONATE 100 MG/ML
2 VIAL (ML) INTRAVENOUS ONCE
Refills: 0 | Status: COMPLETED | OUTPATIENT
Start: 2019-12-14 | End: 2019-12-14

## 2019-12-14 RX ORDER — VANCOMYCIN HCL 1 G
1000 VIAL (EA) INTRAVENOUS ONCE
Refills: 0 | Status: COMPLETED | OUTPATIENT
Start: 2019-12-14 | End: 2019-12-14

## 2019-12-14 RX ORDER — PIPERACILLIN AND TAZOBACTAM 4; .5 G/20ML; G/20ML
3.38 INJECTION, POWDER, LYOPHILIZED, FOR SOLUTION INTRAVENOUS ONCE
Refills: 0 | Status: DISCONTINUED | OUTPATIENT
Start: 2019-12-14 | End: 2019-12-15

## 2019-12-14 RX ORDER — INSULIN LISPRO 100/ML
VIAL (ML) SUBCUTANEOUS AT BEDTIME
Refills: 0 | Status: DISCONTINUED | OUTPATIENT
Start: 2019-12-14 | End: 2019-12-15

## 2019-12-14 RX ORDER — PIPERACILLIN AND TAZOBACTAM 4; .5 G/20ML; G/20ML
3.38 INJECTION, POWDER, LYOPHILIZED, FOR SOLUTION INTRAVENOUS EVERY 12 HOURS
Refills: 0 | Status: DISCONTINUED | OUTPATIENT
Start: 2019-12-14 | End: 2019-12-19

## 2019-12-14 RX ORDER — GLUCAGON INJECTION, SOLUTION 0.5 MG/.1ML
1 INJECTION, SOLUTION SUBCUTANEOUS ONCE
Refills: 0 | Status: DISCONTINUED | OUTPATIENT
Start: 2019-12-14 | End: 2019-12-15

## 2019-12-14 RX ORDER — HEPARIN SODIUM 5000 [USP'U]/ML
5000 INJECTION INTRAVENOUS; SUBCUTANEOUS EVERY 8 HOURS
Refills: 0 | Status: DISCONTINUED | OUTPATIENT
Start: 2019-12-14 | End: 2019-12-15

## 2019-12-14 RX ADMIN — SODIUM CHLORIDE 2200 MILLILITER(S): 9 INJECTION INTRAMUSCULAR; INTRAVENOUS; SUBCUTANEOUS at 20:43

## 2019-12-14 RX ADMIN — Medication 80 MILLIGRAM(S): at 20:31

## 2019-12-14 RX ADMIN — FENTANYL CITRATE 50 MICROGRAM(S): 50 INJECTION INTRAVENOUS at 21:14

## 2019-12-14 RX ADMIN — Medication 200 GRAM(S): at 19:42

## 2019-12-14 RX ADMIN — Medication 250 MILLIGRAM(S): at 19:43

## 2019-12-14 RX ADMIN — KETAMINE HYDROCHLORIDE 100 MILLIGRAM(S): 100 INJECTION INTRAMUSCULAR; INTRAVENOUS at 20:30

## 2019-12-14 RX ADMIN — SODIUM CHLORIDE 2200 MILLILITER(S): 9 INJECTION INTRAMUSCULAR; INTRAVENOUS; SUBCUTANEOUS at 17:56

## 2019-12-14 RX ADMIN — PIPERACILLIN AND TAZOBACTAM 3.38 GRAM(S): 4; .5 INJECTION, POWDER, LYOPHILIZED, FOR SOLUTION INTRAVENOUS at 20:43

## 2019-12-14 RX ADMIN — Medication 1000 MILLIGRAM(S): at 20:43

## 2019-12-14 RX ADMIN — Medication 6.59 MICROGRAM(S)/KG/MIN: at 20:38

## 2019-12-14 RX ADMIN — Medication 650 MILLIGRAM(S): at 17:58

## 2019-12-14 RX ADMIN — PIPERACILLIN AND TAZOBACTAM 200 GRAM(S): 4; .5 INJECTION, POWDER, LYOPHILIZED, FOR SOLUTION INTRAVENOUS at 17:56

## 2019-12-14 NOTE — H&P ADULT - NSHPREVIEWOFSYSTEMS_GEN_ALL_CORE
Review of systems could not be obtained Review of systems could not be obtained given mental status, intubation

## 2019-12-14 NOTE — ED PROVIDER NOTE - CLINICAL SUMMARY MEDICAL DECISION MAKING FREE TEXT BOX
Anand: presents hypotensive, febrile , breathing hard. Will treat early as sepsis. fluids empiric antibiotics. high risk. Will start with fluid bolus and reassess. Anand: presents hypotensive, febrile , breathing hard. Will treat early as sepsis. fluids empiric antibiotics. high risk. Will start with fluid bolus and reassess.  Lesion on rt arm from biopsy without sig cellulitis or abscess. Urine or abd are also likely sources.

## 2019-12-14 NOTE — ED PROVIDER NOTE - ATTENDING CONTRIBUTION TO CARE
I performed a history and physical exam of the patient and discussed their management with the resident and /or advanced care provider. I reviewed the resident and /or ACP's note and agree with the documented findings and plan of care. My medical decison making and observations are found above.  Lungs rhonchi at bases. abd soft

## 2019-12-14 NOTE — H&P ADULT - HISTORY OF PRESENT ILLNESS
Ms. Seay is a 89 yo F with a PMH significant for chronic UTI, wheel chair bound, dementia, afib, BiV pacemaker, HLD, gallstone pancreatitis p/w AMS. She has had nasal congestion, rhionrrhea, and cough with and without eating. She had one episode of diarrhea, nausea, NBBB emesis today. Since last night, she has had decreased PO intake and verbalized vague complaints of not feeling well. Denies abdominal pain or dysuria but incontinent at baseline. Reports no wounds or ulceration.    She recently underwent biopsy of R arm for concern of cancer with dermatology and she was started on prophylactic valtrex.     At baseline, she is wheel chair bound, minimally verbal but able to say "yes" or "no." She is incontinent. She requires ATC aides for ADLs.     In the ED, tmax 101.2, HR 60-70, BP 83/62, O2 100% on nonrebreather. She was intubated ketamine, rocuronium, and started on fentanyl drip

## 2019-12-14 NOTE — ED PROVIDER NOTE - PHYSICAL EXAMINATION
PHYSICAL EXAM:   General: ill appearing  HEENT: NC/AT, airway patent  Cardiovascular: regular rate and paced rhythm  Respiratory: diffuse wheezing R>L  Abdominal: soft, distended, reducible umbilical hernia, difficult to assess tenderness 2/2 mental status  Neuro: Not conversive  Skin: reticular lacy appearing rash on abdomen and lower extremities. Lesion on R arm without pus, mild erythema  -Malu Chan PGY-2

## 2019-12-14 NOTE — ED ADULT NURSE REASSESSMENT NOTE - NS ED NURSE REASSESS COMMENT FT1
Pt AAOx0. Pt placed on a nonrebreather after low saturations with nasal cannula. Pt had a bowel movement and was wiped down and changed. Family at bedside. MD Forte aware of pt decreasing BP and unsuccessful preston attempts. Will continue to monitor.

## 2019-12-14 NOTE — ED ADULT NURSE REASSESSMENT NOTE - NS ED NURSE REASSESS COMMENT FT1
Pt intubated by ED Resident. 7.5 ET tube via RSI. Lip line 21. Intubation confirmed through auscultation and capnography. Pt placed on vent by respiratory therapist. MICU resident at bedside for consult. Pt started on Levofed at 0.05mcg/kg/min. Titrated to MAP of >65. Will continue to monitor.

## 2019-12-14 NOTE — H&P ADULT - NSHPSOCIALHISTORY_GEN_ALL_CORE
Smoking - None  Alcohol - None  Ilicit drug use - None  Lives at home with family. Help with 24 hr aid

## 2019-12-14 NOTE — ED PROVIDER NOTE - CRITICAL CARE PROVIDED
interpretation of diagnostic studies/documentation/consult w/ pt's family directly relating to pts condition/additional history taking/direct patient care (not related to procedure)

## 2019-12-14 NOTE — ED PROVIDER NOTE - PROGRESS NOTE DETAILS
Malu Chan MD PGY-2  K elevated to 9 on CMP with severe hemolysis. EKG paced so unable to eval for ekg changes 2/2 hyperK. will treat with Ca gluconate but get repeat labs including BMP and lipase and repeat VBG Malu Chan MD PGY-2 hypotensive to 80s systolic, appears more cyanotic, more labored respirations, mottled lower extremities, MICU consulted. Nicole Do MD, PGY2: Patient received at resident sign out. Pt decompesating, mottled skin, crummy, BP dropped to 80, starting norepinephrine, but worsening respiratory pattern, not arousable, needs intubation. The son was informed about the situation, high mortality, requires pressure and intubation. understands the situation, and agrees with intubation and mechanical ventilation. ketamin, esther will be used. PGY2/MD Ernestina. Pt was intubated, ETT position was verified on protable xray, NG tube was placed, 200 mL, dark/green gastric contents was drained. BP is now maintaned well with a levophed, 0.1 ug/kg/min, left femoral arterial pulse is pulpable, EKG waveform altered before the intubation no improvement, pt is still in shock but now propery resuscitated. ready to go to MICU and get CT scan, waiting for the MICU bed.

## 2019-12-14 NOTE — H&P ADULT - ASSESSMENT
Ms. Seay is a 89 yo F with a PMH significant for chronic UTI, wheel chair bound, dementia, afib, BiV pacemaker, HLD, gallstone pancreatitis off all home medications p/w AMS found to have septic shock.    #Neuro:  Sedated while intubated  -C/w Propofol    Dementia    #CV:  Afib  -Off anticoagulation at home  -C/w    #Pulm:  #GI:  #/Renal:  #Endo/Rheum:  #Heme/onc:  #ID:  #[id number] ppx  #Dispo:    Leno Moctezuma MD  PGY-1  MICU Ms. Seay is a 89 yo F with a PMH significant for chronic UTI, wheel chair bound, dementia, afib, BiV pacemaker, HLD, gallstone pancreatitis off all home medications p/w AMS found to have septic shock.    #Neuro:  Sedated while intubated  -C/w Propofol    Dementia  -Baseline: Says yes and no and requires 24 hour aid. Wheel chair bound.    #CV:  Afib  -Off anticoagulation at home  -BiV pacemaker, HR 70s    #Pulm:  Bilateral pleural effusions  -Holding diuresis given DENISE and euvolemia  -Continue to reassess fluid status    Intubated for airway protection  -, RR 25, FIO2 100, PEEP 5  -Serial ABGs    Possible aspiration PNA  -Patient coughing with feeding, hx of dementia  -C/w vanc, zosyn, azithro (12/15-12/21)    #GI:  Possible stercoral colitis, transmural wall thickening  -Bowel regimen  -F/u final read on CT abdomen    Transaminitis, likely hypoperfusion  -Maintain MAP 65  -Trend CMP    #/Renal:  DENISE, likely pre-renal v ATN  -Maintain MAP > 65  -     Lactic acidosis, intubated and paralyzed  -Continue to maintain perfusion with pressors  -Trend ABG    Pneumocystis on CT, likely due to repeated catheterization    #Endo/Rheum:    #Skin    #Heme/onc:  #ID:  #[id number] ppx  #Dispo:    Leno Moctezuma MD  PGY-1  MICU Ms. Seay is a 89 yo F with a PMH significant for chronic UTI, wheel chair bound, dementia, afib, BiV pacemaker, HLD, gallstone pancreatitis off all home medications p/w AMS found to have septic shock.    #Neuro:  Sedated while intubated  -C/w Propofol    Dementia  -Baseline: Says yes and no and requires 24 hour aid. Wheel chair bound.    #CV:  Afib  -Off anticoagulation at home  -BiV pacemaker, HR 70s    #Pulm:  Bilateral pleural effusions  -Holding diuresis given DENISE and euvolemia  -Continue to reassess fluid status    Intubated for airway protection  -, RR 25, FIO2 100, PEEP 5  -Serial ABGs    Possible aspiration PNA  -Patient coughing with feeding, hx of dementia  -C/w vanc, zosyn, azithro (12/15-12/21)    #GI:  Possible stercoral colitis, transmural wall thickening  -Bowel regimen  -F/u final read on CT abdomen    Transaminitis, likely hypoperfusion  -Maintain MAP 65  -Trend CMP    #/Renal:  DENISE, likely pre-renal v ATN  -Maintain MAP > 65  -     Lactic acidosis, intubated and paralyzed  -Continue to maintain perfusion with pressors  -Trend ABG    Hyperkalemia  -S/p albuterol and regular insulin  -F/u glucose  -No lasix    #Endo/Rheum:  Hyperglycemia  -SSI    #Skin  S/p skin biopsy on Wednesday on RUE  -Routine wound dressing    #Heme/onc:  No active issues    #ID:  Septic shock  -    #DVT ppx  #Dispo:    Leno Moctezuma MD  PGY-1  MICU Ms. Seay is a 89 yo F with a PMH significant for chronic UTI, wheel chair bound, dementia, afib, BiV pacemaker, HLD, gallstone pancreatitis off all home medications p/w AMS found to have septic shock.    #Neuro:  Sedated while intubated  -C/w Propofol    Dementia  -Baseline: Says yes and no and requires 24 hour aid. Wheel chair bound.    #CV:  Afib  -Off anticoagulation at home  -BiV pacemaker, HR 70s    #Pulm:  Bilateral pleural effusions  -Holding diuresis given DENISE and euvolemia  -Continue to reassess fluid status    Intubated for airway protection  -, RR 25, FIO2 100, PEEP 5  -Serial ABGs    Possible aspiration PNA  -Patient coughing with feeding, hx of dementia  -C/w vanc, zosyn, azithro (12/15-12/21)    #GI:  Possible stercoral colitis, transmural wall thickening  -Bowel regimen  -F/u final read on CT abdomen    Transaminitis, likely hypoperfusion  -Maintain MAP 65  -Trend CMP    #/Renal:  DENISE, likely pre-renal v ATN  -Maintain MAP > 65  -     Lactic acidosis, intubated and paralyzed  -Continue to maintain perfusion with pressors  -Trend ABG    Hyperkalemia  -S/p albuterol and regular insulin  -F/u glucose  -No lasix    #Endo/Rheum:  Hyperglycemia  -SSI    #Skin  S/p skin biopsy on Wednesday on RUE  -Routine wound dressing    #Heme/onc:  No active issues    #ID:  Septic shock  - Likely secondary to aspiration pneumonia vs stercoral colitis vs UTI  - Continue with empiric coverage with Zosyn and Azithromycin   - Pending blood and urine culture, urine legionella    # DVT ppx  - heparin bella Moctezuma MD  PGY-1  MICU

## 2019-12-14 NOTE — H&P ADULT - NSICDXPASTMEDICALHX_GEN_ALL_CORE_FT
PAST MEDICAL HISTORY:  Afib     Benign Essential Hypertension     CAD (Coronary Artery Disease)     Dyslipidemia     Pacemaker     Syncope

## 2019-12-14 NOTE — ED ADULT NURSE REASSESSMENT NOTE - NS ED NURSE REASSESS COMMENT FT1
2 Tony insertions attempted unsuccesfully. UA and UC unable to obtain. 2 RN sterility maintained. Tony catheter removed.

## 2019-12-14 NOTE — ED PROVIDER NOTE - OBJECTIVE STATEMENT
Malu Chan MD PGY-2 89 yo  F with PMH afib with pacemaker, bedbound, needs assitance with ADLs, ?dementia, minimally verbal at baseline presents with 1 episode of nausea and nonbloody emesis with diarrhea since thursday. Less verbal than usual. Hx obtained from son and aid at bedside as patient unable to verbalize. Coughing intermittently, especially after eating. Had bx of R arm on wednesday, and has been on prophylactic valtrex since then. Has hx recurrent UTIs, is colonized per son. Per son, patient had sepsis in the past, unclear source but mentions cholecystitis/pancreatitis.

## 2019-12-14 NOTE — H&P ADULT - NSHPPHYSICALEXAM_GEN_ALL_CORE
PHYSICAL EXAM:    Vital Signs Last 24 Hrs  T(C): 36.6 (14 Dec 2019 23:15), Max: 38.4 (14 Dec 2019 17:16)  T(F): 97.8 (14 Dec 2019 23:15), Max: 101.2 (14 Dec 2019 17:16)  HR: 104 (14 Dec 2019 23:39) (64 - 109)  BP: 110/79 (14 Dec 2019 23:15) (83/62 - 137/93)  BP(mean): 88 (14 Dec 2019 23:15) (82 - 93)  RR: 19 (14 Dec 2019 23:15) (18 - 27)  SpO2: 98% (14 Dec 2019 23:39) (95% - 100%)    General: Intubated, paralyzed, sedated. Resting without spontaneous movements.  HEENT: NCAT.  PERRL.   Neck: Supple.  Full ROM.  No JVD. No cervical lymphadenopathy.  Heart: Irregularly irregular. Normal S1 and S2.  No murmurs, rubs, or gallops.   Lungs: Bilateral bronchial breath sounds with transmitted upper airway sounds   Abdomen: BS+, soft, NT/ND.  No organomegaly.  Skin: Warm and dry.  No rashes.  Extremities: No edema, clubbing, or cyanosis.  2+ peripheral pulses b/l.  Musculoskeletal: No deformities.  No spinal or paraspinal tenderness.  Neuro: A&Ox3.  CN II-XII intact.  5/5 strength in UE and LE b/l.  Tactile sensation intact in UE and LE b/l.  Cerebellar function intact PHYSICAL EXAM:    Vital Signs Last 24 Hrs  T(C): 36.6 (14 Dec 2019 23:15), Max: 38.4 (14 Dec 2019 17:16)  T(F): 97.8 (14 Dec 2019 23:15), Max: 101.2 (14 Dec 2019 17:16)  HR: 104 (14 Dec 2019 23:39) (64 - 109)  BP: 110/79 (14 Dec 2019 23:15) (83/62 - 137/93)  BP(mean): 88 (14 Dec 2019 23:15) (82 - 93)  RR: 19 (14 Dec 2019 23:15) (18 - 27)  SpO2: 98% (14 Dec 2019 23:39) (95% - 100%)    General: Intubated, paralyzed, sedated. Resting without spontaneous movements.  HEENT: NCAT.  PERRL.   Neck: Supple.  Full ROM.  No JVD. No cervical lymphadenopathy.  Heart: Irregularly irregular. Normal S1 and S2.  No murmurs, rubs, or gallops.   Lungs: Bilateral bronchial breath sounds with transmitted upper airway sounds   Abdomen: BS+, soft, NT/ND.  No organomegaly.  Skin: Dressed right upper extremity. There is no purulence. There is asymmetric hyperpigmentation.  Extremities: No edema, clubbing, or cyanosis.  2+ peripheral pulses b/l.  Musculoskeletal: No deformities.  No spinal or paraspinal tenderness.  Neuro: Does not alert to noxious stimuli. Weak gag reflex. PERRL.    POC US: Decreased cardiac output to 2 L. Gallbladder wall edema with complex air.

## 2019-12-14 NOTE — H&P ADULT - NSICDXFAMILYHX_GEN_ALL_CORE_FT
FAMILY HISTORY:  Sibling  Still living? Unknown  Family history of Alzheimer's disease, Age at diagnosis: Age Unknown    Child  Still living? Unknown  Family history of schizophrenia, Age at diagnosis: Age Unknown

## 2019-12-14 NOTE — H&P ADULT - NSHPLABSRESULTS_GEN_ALL_CORE
LABS:                          12.8   18.99 )-----------( 155      ( 14 Dec 2019 21:36 )             42.4       12-14    131<L>  |  101  |  44<H>  ----------------------------<  448<H>  5.8<H>   |  7<LL>  |  2.40<H>    Ca    8.5      14 Dec 2019 21:36    TPro  5.7<L>  /  Alb  2.7<L>  /  TBili  0.8  /  DBili  x   /  AST  789<H>  /  ALT  577<H>  /  AlkPhos  73  12-14       LIVER FUNCTIONS - ( 14 Dec 2019 21:36 )  Alb: 2.7 g/dL / Pro: 5.7 g/dL / ALK PHOS: 73 U/L / ALT: 577 U/L / AST: 789 U/L / GGT: x                ABG - ( 14 Dec 2019 21:36 )  pH, Arterial: 7.11  pH, Blood: x     /  pCO2: 30    /  pO2: 354   / HCO3: 9     / Base Excess: -19.6 /  SaO2: 99            Lactate Trend  Lactate 10.2          CAPILLARY BLOOD GLUCOSE  Glucose 448 < 208          RADIOLOGY & ADDITIONAL TESTS:   CXR: R pleural effusion  CT abdomen and chest pending LABS:                          12.8   18.99 )-----------( 155      ( 14 Dec 2019 21:36 )             42.4       12-14    131<L>  |  101  |  44<H>  ----------------------------<  448<H>  5.8<H>   |  7<LL>  |  2.40<H>    Ca    8.5      14 Dec 2019 21:36    TPro  5.7<L>  /  Alb  2.7<L>  /  TBili  0.8  /  DBili  x   /  AST  789<H>  /  ALT  577<H>  /  AlkPhos  73  12-14       LIVER FUNCTIONS - ( 14 Dec 2019 21:36 )  Alb: 2.7 g/dL / Pro: 5.7 g/dL / ALK PHOS: 73 U/L / ALT: 577 U/L / AST: 789 U/L / GGT: x                ABG - ( 14 Dec 2019 21:36 )  pH, Arterial: 7.11  pH, Blood: x     /  pCO2: 30    /  pO2: 354   / HCO3: 9     / Base Excess: -19.6 /  SaO2: 99            Lactate Trend  Lactate 10.2          CAPILLARY BLOOD GLUCOSE  Glucose 448 < 208          RADIOLOGY & ADDITIONAL TESTS:   CXR: R pleural effusion  CT abdomen and chest pending  CT chest:  Moderate bilateral pleural effusions with atelectasis. ET tube is above rigoberto. Enteric tube in place. Small volume ascites. LABS:                          12.8   18.99 )-----------( 155      ( 14 Dec 2019 21:36 )             42.4       12-14    131<L>  |  101  |  44<H>  ----------------------------<  448<H>  5.8<H>   |  7<LL>  |  2.40<H>    Ca    8.5      14 Dec 2019 21:36    TPro  5.7<L>  /  Alb  2.7<L>  /  TBili  0.8  /  DBili  x   /  AST  789<H>  /  ALT  577<H>  /  AlkPhos  73  12-14       LIVER FUNCTIONS - ( 14 Dec 2019 21:36 )  Alb: 2.7 g/dL / Pro: 5.7 g/dL / ALK PHOS: 73 U/L / ALT: 577 U/L / AST: 789 U/L / GGT: x                ABG - ( 14 Dec 2019 21:36 )  pH, Arterial: 7.11  pH, Blood: x     /  pCO2: 30    /  pO2: 354   / HCO3: 9     / Base Excess: -19.6 /  SaO2: 99            Lactate Trend  Lactate 10.2 > 6.3          CAPILLARY BLOOD GLUCOSE  Glucose 448 < 208          RADIOLOGY & ADDITIONAL TESTS:   CXR: R pleural effusion  CT abdomen and chest pending  CT chest:  Moderate bilateral pleural effusions with atelectasis. ET tube is above rigoberto. Enteric tube in place. Small volume ascites.

## 2019-12-14 NOTE — ED ADULT NURSE NOTE - NSIMPLEMENTINTERV_GEN_ALL_ED
Implemented All Fall with Harm Risk Interventions:  [place] to call system. Call bell, personal items and telephone within reach. Instruct patient to call for assistance. Room bathroom lighting operational. Non-slip footwear when patient is off stretcher. Physically safe environment: no spills, clutter or unnecessary equipment. Stretcher in lowest position, wheels locked, appropriate side rails in place. Provide visual cue, wrist band, yellow gown, etc. Monitor gait and stability. Monitor for mental status changes and reorient to person, place, and time. Review medications for side effects contributing to fall risk. Reinforce activity limits and safety measures with patient and family. Provide visual clues: red socks.

## 2019-12-14 NOTE — ED ADULT NURSE NOTE - OBJECTIVE STATEMENT
89 yr old female by EMS (nasal cannula in place, NS infusing) from home (accompanied by son and pvt aide) c/o lethargy, vomiting x 1 at home, AMS, (hx macular degeneration, bed <--> chair bound, nonambulatory), R forearm quarter sized open wound s/p procedure for skin cancer, completed dose of valtrex prophylactively: dressing intact, on exam, +febrile, stage 1 buttocks, minimally verbal, lethargic, hypoxic in 80's on room air, son reports similar presentation over a yr ago, dx: pancreatitis?, per aide, pt with frequent formed brown BMs throughout the day, lives at home with 24 hr home health aides, "belly looks bloated" per aide

## 2019-12-14 NOTE — H&P ADULT - ATTENDING COMMENTS
89 yo F with a PMH significant for chronic UTI, wheel chair bound, dementia, afib, BiV pacemaker, HLD, gallstone pancreatitis off all home medications p/w AMS found to have combined septic and cardiogenic shock.  POCUS with Gallbladder stones, pericholysistic fluid and sludge, b/l consolidations, and a-line anterior.  severe segmental LV dysfunction and VTI of 8.5 LVOT 2cm.   she is septic, with complications of MI and multi organ failure.  Over very poor prognosis.   Will add cardiac enzymes, continue with broad abx, follow up read on CT and cultures.   GOC discussed and pt is DNR.   Attending with resident/fellow:  I have personally provided___45_minutes of critical care time concurrently with the resident/fellow. This time excludes time spent on separate procedures and time spent teaching. I have reviewed the resident/fellow’s documentation and I agree with the assessment and plan of care.

## 2019-12-15 LAB
ALBUMIN SERPL ELPH-MCNC: 2.2 G/DL — LOW (ref 3.3–5)
ALBUMIN SERPL ELPH-MCNC: 2.7 G/DL — LOW (ref 3.3–5)
ALBUMIN SERPL ELPH-MCNC: 2.9 G/DL — LOW (ref 3.3–5)
ALBUMIN SERPL ELPH-MCNC: 3.2 G/DL — LOW (ref 3.3–5)
ALP SERPL-CCNC: 64 U/L — SIGNIFICANT CHANGE UP (ref 40–120)
ALP SERPL-CCNC: 73 U/L — SIGNIFICANT CHANGE UP (ref 40–120)
ALP SERPL-CCNC: 80 U/L — SIGNIFICANT CHANGE UP (ref 40–120)
ALP SERPL-CCNC: 87 U/L — SIGNIFICANT CHANGE UP (ref 40–120)
ALT FLD-CCNC: 1356 U/L — HIGH (ref 10–45)
ALT FLD-CCNC: 1976 U/L — HIGH (ref 10–45)
ALT FLD-CCNC: 2066 U/L — HIGH (ref 10–45)
ALT FLD-CCNC: 2560 U/L — HIGH (ref 10–45)
ANION GAP SERPL CALC-SCNC: 15 MMOL/L — SIGNIFICANT CHANGE UP (ref 5–17)
ANION GAP SERPL CALC-SCNC: 16 MMOL/L — SIGNIFICANT CHANGE UP (ref 5–17)
ANION GAP SERPL CALC-SCNC: 19 MMOL/L — HIGH (ref 5–17)
ANION GAP SERPL CALC-SCNC: 19 MMOL/L — HIGH (ref 5–17)
APPEARANCE UR: ABNORMAL
APTT BLD: 32.1 SEC — SIGNIFICANT CHANGE UP (ref 27.5–36.3)
APTT BLD: 53.6 SEC — HIGH (ref 27.5–36.3)
APTT BLD: 65.2 SEC — HIGH (ref 27.5–36.3)
AST SERPL-CCNC: 1869 U/L — HIGH (ref 10–40)
AST SERPL-CCNC: 2078 U/L — HIGH (ref 10–40)
AST SERPL-CCNC: 2747 U/L — HIGH (ref 10–40)
AST SERPL-CCNC: 4468 U/L — HIGH (ref 10–40)
BACTERIA # UR AUTO: NEGATIVE — SIGNIFICANT CHANGE UP
BASOPHILS # BLD AUTO: 0.06 K/UL — SIGNIFICANT CHANGE UP (ref 0–0.2)
BASOPHILS # BLD AUTO: 0.07 K/UL — SIGNIFICANT CHANGE UP (ref 0–0.2)
BASOPHILS NFR BLD AUTO: 0.3 % — SIGNIFICANT CHANGE UP (ref 0–2)
BASOPHILS NFR BLD AUTO: 0.3 % — SIGNIFICANT CHANGE UP (ref 0–2)
BILIRUB SERPL-MCNC: 0.5 MG/DL — SIGNIFICANT CHANGE UP (ref 0.2–1.2)
BILIRUB SERPL-MCNC: 0.6 MG/DL — SIGNIFICANT CHANGE UP (ref 0.2–1.2)
BILIRUB UR-MCNC: NEGATIVE — SIGNIFICANT CHANGE UP
BUN SERPL-MCNC: 44 MG/DL — HIGH (ref 7–23)
BUN SERPL-MCNC: 46 MG/DL — HIGH (ref 7–23)
BUN SERPL-MCNC: 51 MG/DL — HIGH (ref 7–23)
BUN SERPL-MCNC: 57 MG/DL — HIGH (ref 7–23)
CALCIUM SERPL-MCNC: 7.7 MG/DL — LOW (ref 8.4–10.5)
CALCIUM SERPL-MCNC: 8.3 MG/DL — LOW (ref 8.4–10.5)
CALCIUM SERPL-MCNC: 8.6 MG/DL — SIGNIFICANT CHANGE UP (ref 8.4–10.5)
CALCIUM SERPL-MCNC: 8.7 MG/DL — SIGNIFICANT CHANGE UP (ref 8.4–10.5)
CHLORIDE SERPL-SCNC: 100 MMOL/L — SIGNIFICANT CHANGE UP (ref 96–108)
CHLORIDE SERPL-SCNC: 101 MMOL/L — SIGNIFICANT CHANGE UP (ref 96–108)
CHLORIDE SERPL-SCNC: 95 MMOL/L — LOW (ref 96–108)
CHLORIDE SERPL-SCNC: 99 MMOL/L — SIGNIFICANT CHANGE UP (ref 96–108)
CK MB BLD-MCNC: 10.4 % — HIGH (ref 0–3.5)
CK MB BLD-MCNC: 8 % — HIGH (ref 0–3.5)
CK MB BLD-MCNC: 9.2 % — HIGH (ref 0–3.5)
CK MB CFR SERPL CALC: 220.1 NG/ML — HIGH (ref 0–3.8)
CK MB CFR SERPL CALC: 262.7 NG/ML — HIGH (ref 0–3.8)
CK MB CFR SERPL CALC: 293.9 NG/ML — HIGH (ref 0–3.8)
CK MB CFR SERPL CALC: 349 NG/ML — HIGH (ref 0–3.8)
CK MB CFR SERPL CALC: 445.3 NG/ML — HIGH (ref 0–3.8)
CK SERPL-CCNC: 2123 U/L — HIGH (ref 25–170)
CK SERPL-CCNC: 3804 U/L — HIGH (ref 25–170)
CK SERPL-CCNC: 5542 U/L — HIGH (ref 25–170)
CO2 SERPL-SCNC: 11 MMOL/L — LOW (ref 22–31)
CO2 SERPL-SCNC: 12 MMOL/L — LOW (ref 22–31)
CO2 SERPL-SCNC: 17 MMOL/L — LOW (ref 22–31)
CO2 SERPL-SCNC: 21 MMOL/L — LOW (ref 22–31)
COLOR SPEC: ABNORMAL
CREAT SERPL-MCNC: 2.57 MG/DL — HIGH (ref 0.5–1.3)
CREAT SERPL-MCNC: 2.69 MG/DL — HIGH (ref 0.5–1.3)
CREAT SERPL-MCNC: 2.98 MG/DL — HIGH (ref 0.5–1.3)
CREAT SERPL-MCNC: 3.67 MG/DL — HIGH (ref 0.5–1.3)
DIFF PNL FLD: ABNORMAL
EOSINOPHIL # BLD AUTO: 0 K/UL — SIGNIFICANT CHANGE UP (ref 0–0.5)
EOSINOPHIL # BLD AUTO: 0.03 K/UL — SIGNIFICANT CHANGE UP (ref 0–0.5)
EOSINOPHIL NFR BLD AUTO: 0 % — SIGNIFICANT CHANGE UP (ref 0–6)
EOSINOPHIL NFR BLD AUTO: 0.1 % — SIGNIFICANT CHANGE UP (ref 0–6)
EPI CELLS # UR: 14 /HPF — HIGH
GAS PNL BLDA: SIGNIFICANT CHANGE UP
GAS PNL BLDA: SIGNIFICANT CHANGE UP
GLUCOSE BLDC GLUCOMTR-MCNC: 196 MG/DL — HIGH (ref 70–99)
GLUCOSE BLDC GLUCOMTR-MCNC: 260 MG/DL — HIGH (ref 70–99)
GLUCOSE BLDC GLUCOMTR-MCNC: 354 MG/DL — HIGH (ref 70–99)
GLUCOSE SERPL-MCNC: 258 MG/DL — HIGH (ref 70–99)
GLUCOSE SERPL-MCNC: 303 MG/DL — HIGH (ref 70–99)
GLUCOSE SERPL-MCNC: 397 MG/DL — HIGH (ref 70–99)
GLUCOSE SERPL-MCNC: 534 MG/DL — CRITICAL HIGH (ref 70–99)
GLUCOSE UR QL: ABNORMAL
HBA1C BLD-MCNC: 6.2 % — HIGH (ref 4–5.6)
HCT VFR BLD CALC: 36 % — SIGNIFICANT CHANGE UP (ref 34.5–45)
HCT VFR BLD CALC: 42.1 % — SIGNIFICANT CHANGE UP (ref 34.5–45)
HCT VFR BLD CALC: 44.7 % — SIGNIFICANT CHANGE UP (ref 34.5–45)
HGB BLD-MCNC: 11.8 G/DL — SIGNIFICANT CHANGE UP (ref 11.5–15.5)
HGB BLD-MCNC: 13.8 G/DL — SIGNIFICANT CHANGE UP (ref 11.5–15.5)
HGB BLD-MCNC: 14.3 G/DL — SIGNIFICANT CHANGE UP (ref 11.5–15.5)
HYALINE CASTS # UR AUTO: 46 /LPF — HIGH (ref 0–2)
IMM GRANULOCYTES NFR BLD AUTO: 1.2 % — SIGNIFICANT CHANGE UP (ref 0–1.5)
IMM GRANULOCYTES NFR BLD AUTO: 1.2 % — SIGNIFICANT CHANGE UP (ref 0–1.5)
INR BLD: 1.43 RATIO — HIGH (ref 0.88–1.16)
KETONES UR-MCNC: SIGNIFICANT CHANGE UP
LEGIONELLA AG UR QL: NEGATIVE — SIGNIFICANT CHANGE UP
LEUKOCYTE ESTERASE UR-ACNC: ABNORMAL
LYMPHOCYTES # BLD AUTO: 10.1 % — LOW (ref 13–44)
LYMPHOCYTES # BLD AUTO: 11.7 % — LOW (ref 13–44)
LYMPHOCYTES # BLD AUTO: 2.56 K/UL — SIGNIFICANT CHANGE UP (ref 1–3.3)
LYMPHOCYTES # BLD AUTO: 2.68 K/UL — SIGNIFICANT CHANGE UP (ref 1–3.3)
MAGNESIUM SERPL-MCNC: 1.7 MG/DL — SIGNIFICANT CHANGE UP (ref 1.6–2.6)
MAGNESIUM SERPL-MCNC: 1.9 MG/DL — SIGNIFICANT CHANGE UP (ref 1.6–2.6)
MAGNESIUM SERPL-MCNC: 2 MG/DL — SIGNIFICANT CHANGE UP (ref 1.6–2.6)
MAGNESIUM SERPL-MCNC: 2.1 MG/DL — SIGNIFICANT CHANGE UP (ref 1.6–2.6)
MCHC RBC-ENTMCNC: 30.3 PG — SIGNIFICANT CHANGE UP (ref 27–34)
MCHC RBC-ENTMCNC: 30.8 PG — SIGNIFICANT CHANGE UP (ref 27–34)
MCHC RBC-ENTMCNC: 30.8 PG — SIGNIFICANT CHANGE UP (ref 27–34)
MCHC RBC-ENTMCNC: 32 GM/DL — SIGNIFICANT CHANGE UP (ref 32–36)
MCHC RBC-ENTMCNC: 32.8 GM/DL — SIGNIFICANT CHANGE UP (ref 32–36)
MCHC RBC-ENTMCNC: 32.8 GM/DL — SIGNIFICANT CHANGE UP (ref 32–36)
MCV RBC AUTO: 92.3 FL — SIGNIFICANT CHANGE UP (ref 80–100)
MCV RBC AUTO: 94 FL — SIGNIFICANT CHANGE UP (ref 80–100)
MCV RBC AUTO: 96.1 FL — SIGNIFICANT CHANGE UP (ref 80–100)
MONOCYTES # BLD AUTO: 0.71 K/UL — SIGNIFICANT CHANGE UP (ref 0–0.9)
MONOCYTES # BLD AUTO: 0.74 K/UL — SIGNIFICANT CHANGE UP (ref 0–0.9)
MONOCYTES NFR BLD AUTO: 2.7 % — SIGNIFICANT CHANGE UP (ref 2–14)
MONOCYTES NFR BLD AUTO: 3.4 % — SIGNIFICANT CHANGE UP (ref 2–14)
NEUTROPHILS # BLD AUTO: 18.17 K/UL — HIGH (ref 1.8–7.4)
NEUTROPHILS # BLD AUTO: 22.64 K/UL — HIGH (ref 1.8–7.4)
NEUTROPHILS NFR BLD AUTO: 83.3 % — HIGH (ref 43–77)
NEUTROPHILS NFR BLD AUTO: 85.7 % — HIGH (ref 43–77)
NITRITE UR-MCNC: NEGATIVE — SIGNIFICANT CHANGE UP
NRBC # BLD: 0 /100 WBCS — SIGNIFICANT CHANGE UP (ref 0–0)
PH UR: 6 — SIGNIFICANT CHANGE UP (ref 5–8)
PHOSPHATE SERPL-MCNC: 3 MG/DL — SIGNIFICANT CHANGE UP (ref 2.5–4.5)
PHOSPHATE SERPL-MCNC: 3.5 MG/DL — SIGNIFICANT CHANGE UP (ref 2.5–4.5)
PHOSPHATE SERPL-MCNC: 3.9 MG/DL — SIGNIFICANT CHANGE UP (ref 2.5–4.5)
PHOSPHATE SERPL-MCNC: 5.8 MG/DL — HIGH (ref 2.5–4.5)
PLATELET # BLD AUTO: 138 K/UL — LOW (ref 150–400)
PLATELET # BLD AUTO: 163 K/UL — SIGNIFICANT CHANGE UP (ref 150–400)
PLATELET # BLD AUTO: 170 K/UL — SIGNIFICANT CHANGE UP (ref 150–400)
POTASSIUM SERPL-MCNC: 4.4 MMOL/L — SIGNIFICANT CHANGE UP (ref 3.5–5.3)
POTASSIUM SERPL-MCNC: 4.7 MMOL/L — SIGNIFICANT CHANGE UP (ref 3.5–5.3)
POTASSIUM SERPL-MCNC: 6 MMOL/L — HIGH (ref 3.5–5.3)
POTASSIUM SERPL-MCNC: 6.3 MMOL/L — CRITICAL HIGH (ref 3.5–5.3)
POTASSIUM SERPL-SCNC: 4.4 MMOL/L — SIGNIFICANT CHANGE UP (ref 3.5–5.3)
POTASSIUM SERPL-SCNC: 4.7 MMOL/L — SIGNIFICANT CHANGE UP (ref 3.5–5.3)
POTASSIUM SERPL-SCNC: 6 MMOL/L — HIGH (ref 3.5–5.3)
POTASSIUM SERPL-SCNC: 6.3 MMOL/L — CRITICAL HIGH (ref 3.5–5.3)
PROT SERPL-MCNC: 5 G/DL — LOW (ref 6–8.3)
PROT SERPL-MCNC: 5.7 G/DL — LOW (ref 6–8.3)
PROT SERPL-MCNC: 6 G/DL — SIGNIFICANT CHANGE UP (ref 6–8.3)
PROT SERPL-MCNC: 6.5 G/DL — SIGNIFICANT CHANGE UP (ref 6–8.3)
PROT UR-MCNC: ABNORMAL
PROTHROM AB SERPL-ACNC: 16.6 SEC — HIGH (ref 10–12.9)
RBC # BLD: 3.83 M/UL — SIGNIFICANT CHANGE UP (ref 3.8–5.2)
RBC # BLD: 4.56 M/UL — SIGNIFICANT CHANGE UP (ref 3.8–5.2)
RBC # BLD: 4.65 M/UL — SIGNIFICANT CHANGE UP (ref 3.8–5.2)
RBC # FLD: 13.1 % — SIGNIFICANT CHANGE UP (ref 10.3–14.5)
RBC # FLD: 13.2 % — SIGNIFICANT CHANGE UP (ref 10.3–14.5)
RBC # FLD: 13.3 % — SIGNIFICANT CHANGE UP (ref 10.3–14.5)
RBC CASTS # UR COMP ASSIST: 49 /HPF — HIGH (ref 0–4)
SODIUM SERPL-SCNC: 131 MMOL/L — LOW (ref 135–145)
SODIUM SERPL-SCNC: 132 MMOL/L — LOW (ref 135–145)
SP GR SPEC: 1.02 — SIGNIFICANT CHANGE UP (ref 1.01–1.02)
TROPONIN T, HIGH SENSITIVITY RESULT: HIGH NG/L (ref 0–51)
UROBILINOGEN FLD QL: NEGATIVE — SIGNIFICANT CHANGE UP
VANCOMYCIN TROUGH SERPL-MCNC: 7.8 UG/ML — LOW (ref 10–20)
WBC # BLD: 21.82 K/UL — HIGH (ref 3.8–10.5)
WBC # BLD: 25.14 K/UL — HIGH (ref 3.8–10.5)
WBC # BLD: 26.41 K/UL — HIGH (ref 3.8–10.5)
WBC # FLD AUTO: 21.82 K/UL — HIGH (ref 3.8–10.5)
WBC # FLD AUTO: 25.14 K/UL — HIGH (ref 3.8–10.5)
WBC # FLD AUTO: 26.41 K/UL — HIGH (ref 3.8–10.5)
WBC UR QL: 153 /HPF — HIGH (ref 0–5)

## 2019-12-15 PROCEDURE — 99291 CRITICAL CARE FIRST HOUR: CPT

## 2019-12-15 PROCEDURE — 93306 TTE W/DOPPLER COMPLETE: CPT | Mod: 26

## 2019-12-15 PROCEDURE — 99223 1ST HOSP IP/OBS HIGH 75: CPT | Mod: GC

## 2019-12-15 RX ORDER — DEXTROSE 50 % IN WATER 50 %
12.5 SYRINGE (ML) INTRAVENOUS ONCE
Refills: 0 | Status: DISCONTINUED | OUTPATIENT
Start: 2019-12-15 | End: 2019-12-17

## 2019-12-15 RX ORDER — INSULIN LISPRO 100/ML
VIAL (ML) SUBCUTANEOUS EVERY 6 HOURS
Refills: 0 | Status: DISCONTINUED | OUTPATIENT
Start: 2019-12-15 | End: 2019-12-15

## 2019-12-15 RX ORDER — ALBUTEROL 90 UG/1
2.5 AEROSOL, METERED ORAL ONCE
Refills: 0 | Status: DISCONTINUED | OUTPATIENT
Start: 2019-12-15 | End: 2019-12-15

## 2019-12-15 RX ORDER — ASPIRIN/CALCIUM CARB/MAGNESIUM 324 MG
81 TABLET ORAL DAILY
Refills: 0 | Status: DISCONTINUED | OUTPATIENT
Start: 2019-12-16 | End: 2019-12-18

## 2019-12-15 RX ORDER — FUROSEMIDE 40 MG
60 TABLET ORAL ONCE
Refills: 0 | Status: COMPLETED | OUTPATIENT
Start: 2019-12-15 | End: 2019-12-15

## 2019-12-15 RX ORDER — INSULIN HUMAN 100 [IU]/ML
10 INJECTION, SOLUTION SUBCUTANEOUS ONCE
Refills: 0 | Status: COMPLETED | OUTPATIENT
Start: 2019-12-15 | End: 2019-12-15

## 2019-12-15 RX ORDER — SODIUM ZIRCONIUM CYCLOSILICATE 10 G/10G
10 POWDER, FOR SUSPENSION ORAL ONCE
Refills: 0 | Status: COMPLETED | OUTPATIENT
Start: 2019-12-15 | End: 2019-12-15

## 2019-12-15 RX ORDER — ALBUTEROL 90 UG/1
7.5 AEROSOL, METERED ORAL ONCE
Refills: 0 | Status: COMPLETED | OUTPATIENT
Start: 2019-12-15 | End: 2019-12-15

## 2019-12-15 RX ORDER — GLUCAGON INJECTION, SOLUTION 0.5 MG/.1ML
1 INJECTION, SOLUTION SUBCUTANEOUS ONCE
Refills: 0 | Status: DISCONTINUED | OUTPATIENT
Start: 2019-12-15 | End: 2019-12-17

## 2019-12-15 RX ORDER — SODIUM CHLORIDE 9 MG/ML
1000 INJECTION, SOLUTION INTRAVENOUS
Refills: 0 | Status: DISCONTINUED | OUTPATIENT
Start: 2019-12-15 | End: 2019-12-17

## 2019-12-15 RX ORDER — DEXTROSE 50 % IN WATER 50 %
50 SYRINGE (ML) INTRAVENOUS ONCE
Refills: 0 | Status: COMPLETED | OUTPATIENT
Start: 2019-12-15 | End: 2019-12-15

## 2019-12-15 RX ORDER — HEPARIN SODIUM 5000 [USP'U]/ML
4000 INJECTION INTRAVENOUS; SUBCUTANEOUS ONCE
Refills: 0 | Status: DISCONTINUED | OUTPATIENT
Start: 2019-12-15 | End: 2019-12-15

## 2019-12-15 RX ORDER — SODIUM BICARBONATE 1 MEQ/ML
50 SYRINGE (ML) INTRAVENOUS ONCE
Refills: 0 | Status: COMPLETED | OUTPATIENT
Start: 2019-12-15 | End: 2019-12-15

## 2019-12-15 RX ORDER — CLOPIDOGREL BISULFATE 75 MG/1
75 TABLET, FILM COATED ORAL DAILY
Refills: 0 | Status: DISCONTINUED | OUTPATIENT
Start: 2019-12-15 | End: 2019-12-18

## 2019-12-15 RX ORDER — ALBUTEROL 90 UG/1
10 AEROSOL, METERED ORAL ONCE
Refills: 0 | Status: DISCONTINUED | OUTPATIENT
Start: 2019-12-15 | End: 2019-12-15

## 2019-12-15 RX ORDER — DEXTROSE 50 % IN WATER 50 %
25 SYRINGE (ML) INTRAVENOUS ONCE
Refills: 0 | Status: DISCONTINUED | OUTPATIENT
Start: 2019-12-15 | End: 2019-12-17

## 2019-12-15 RX ORDER — ALBUTEROL 90 UG/1
10 AEROSOL, METERED ORAL ONCE
Refills: 0 | Status: COMPLETED | OUTPATIENT
Start: 2019-12-15 | End: 2019-12-15

## 2019-12-15 RX ORDER — CALCIUM GLUCONATE 100 MG/ML
1 VIAL (ML) INTRAVENOUS ONCE
Refills: 0 | Status: COMPLETED | OUTPATIENT
Start: 2019-12-15 | End: 2019-12-15

## 2019-12-15 RX ORDER — HEPARIN SODIUM 5000 [USP'U]/ML
INJECTION INTRAVENOUS; SUBCUTANEOUS
Qty: 25000 | Refills: 0 | Status: DISCONTINUED | OUTPATIENT
Start: 2019-12-15 | End: 2019-12-17

## 2019-12-15 RX ORDER — DEXTROSE 50 % IN WATER 50 %
15 SYRINGE (ML) INTRAVENOUS ONCE
Refills: 0 | Status: DISCONTINUED | OUTPATIENT
Start: 2019-12-15 | End: 2019-12-17

## 2019-12-15 RX ORDER — HEPARIN SODIUM 5000 [USP'U]/ML
4000 INJECTION INTRAVENOUS; SUBCUTANEOUS EVERY 6 HOURS
Refills: 0 | Status: DISCONTINUED | OUTPATIENT
Start: 2019-12-15 | End: 2019-12-17

## 2019-12-15 RX ORDER — INSULIN LISPRO 100/ML
VIAL (ML) SUBCUTANEOUS EVERY 4 HOURS
Refills: 0 | Status: DISCONTINUED | OUTPATIENT
Start: 2019-12-15 | End: 2019-12-16

## 2019-12-15 RX ORDER — SODIUM BICARBONATE 1 MEQ/ML
0.15 SYRINGE (ML) INTRAVENOUS
Qty: 150 | Refills: 0 | Status: DISCONTINUED | OUTPATIENT
Start: 2019-12-15 | End: 2019-12-15

## 2019-12-15 RX ORDER — ASPIRIN/CALCIUM CARB/MAGNESIUM 324 MG
325 TABLET ORAL ONCE
Refills: 0 | Status: COMPLETED | OUTPATIENT
Start: 2019-12-15 | End: 2019-12-15

## 2019-12-15 RX ADMIN — Medication 5: at 06:45

## 2019-12-15 RX ADMIN — CHLORHEXIDINE GLUCONATE 1 APPLICATION(S): 213 SOLUTION TOPICAL at 06:04

## 2019-12-15 RX ADMIN — PIPERACILLIN AND TAZOBACTAM 25 GRAM(S): 4; .5 INJECTION, POWDER, LYOPHILIZED, FOR SOLUTION INTRAVENOUS at 17:27

## 2019-12-15 RX ADMIN — CHLORHEXIDINE GLUCONATE 15 MILLILITER(S): 213 SOLUTION TOPICAL at 06:04

## 2019-12-15 RX ADMIN — Medication 75 MEQ/KG/HR: at 11:30

## 2019-12-15 RX ADMIN — HEPARIN SODIUM 950 UNIT(S)/HR: 5000 INJECTION INTRAVENOUS; SUBCUTANEOUS at 17:47

## 2019-12-15 RX ADMIN — Medication 325 MILLIGRAM(S): at 03:13

## 2019-12-15 RX ADMIN — SODIUM ZIRCONIUM CYCLOSILICATE 10 GRAM(S): 10 POWDER, FOR SUSPENSION ORAL at 11:04

## 2019-12-15 RX ADMIN — HEPARIN SODIUM 950 UNIT(S)/HR: 5000 INJECTION INTRAVENOUS; SUBCUTANEOUS at 11:34

## 2019-12-15 RX ADMIN — Medication 14.18 MICROGRAM(S)/KG/MIN: at 05:03

## 2019-12-15 RX ADMIN — ALBUTEROL 2.5 MILLIGRAM(S): 90 AEROSOL, METERED ORAL at 00:45

## 2019-12-15 RX ADMIN — INSULIN HUMAN 10 UNIT(S): 100 INJECTION, SOLUTION SUBCUTANEOUS at 09:19

## 2019-12-15 RX ADMIN — CLOPIDOGREL BISULFATE 75 MILLIGRAM(S): 75 TABLET, FILM COATED ORAL at 04:34

## 2019-12-15 RX ADMIN — PIPERACILLIN AND TAZOBACTAM 25 GRAM(S): 4; .5 INJECTION, POWDER, LYOPHILIZED, FOR SOLUTION INTRAVENOUS at 06:04

## 2019-12-15 RX ADMIN — PROPOFOL 4.54 MICROGRAM(S)/KG/MIN: 10 INJECTION, EMULSION INTRAVENOUS at 04:35

## 2019-12-15 RX ADMIN — INSULIN HUMAN 10 UNIT(S): 100 INJECTION, SOLUTION SUBCUTANEOUS at 00:36

## 2019-12-15 RX ADMIN — SODIUM ZIRCONIUM CYCLOSILICATE 10 GRAM(S): 10 POWDER, FOR SUSPENSION ORAL at 04:34

## 2019-12-15 RX ADMIN — Medication 100 GRAM(S): at 09:19

## 2019-12-15 RX ADMIN — AZITHROMYCIN 250 MILLIGRAM(S): 500 TABLET, FILM COATED ORAL at 00:36

## 2019-12-15 RX ADMIN — Medication 60 MILLIGRAM(S): at 02:01

## 2019-12-15 RX ADMIN — Medication 6: at 17:26

## 2019-12-15 RX ADMIN — Medication 50 MILLILITER(S): at 00:36

## 2019-12-15 RX ADMIN — HEPARIN SODIUM 950 UNIT(S)/HR: 5000 INJECTION INTRAVENOUS; SUBCUTANEOUS at 03:29

## 2019-12-15 RX ADMIN — Medication 2: at 21:49

## 2019-12-15 RX ADMIN — Medication 50 MILLIEQUIVALENT(S): at 02:01

## 2019-12-15 RX ADMIN — Medication 50 MILLILITER(S): at 09:18

## 2019-12-15 RX ADMIN — CHLORHEXIDINE GLUCONATE 15 MILLILITER(S): 213 SOLUTION TOPICAL at 17:26

## 2019-12-15 RX ADMIN — Medication 10: at 15:13

## 2019-12-15 RX ADMIN — ALBUTEROL 7.5 MILLIGRAM(S): 90 AEROSOL, METERED ORAL at 12:18

## 2019-12-15 NOTE — PROGRESS NOTE ADULT - ASSESSMENT
Patient is a 89 year old woman with a history significant for chronic UTI, wheel chair bound, dementia, Afib, BiV pacemaker, HLD, gallstone pancreatitis off all home medications presented with AMS found to have septic shock. Patient also found to have STEMI and in cardiogenic shock.     #Neuro:  - Sedated while intubated  -C/w Propofol    Dementia  -Baseline: Says yes and no and requires 24 hour aid. Wheel chair bound.    # CV:  1. STEMI  - Patient found to have ST elevations on EKG also with elevated troponin of >10 Kx2  - Cardiology consulted, but given patient's advanced age and dementia, patient not ideal candidate for cath   - Continue with Aspirin and Plavix and heparin drip     2. Septic shock   - Likely in the setting of UTI vs stercoral colitis vs aspiration pneumonia  - Continue with Zosyn for aspiration pneumonia coverage, Azithromycin for atypical coverage  - Pending blood culture and urine legionella    3. Afib  - Off anticoagulation at home  - BiV pacemaker, HR 70s  - Continue with heparin drip     # Pulm:  1. Bilateral pleural effusions  -Holding diuresis given DENISE and euvolemia  -Continue to reassess fluid status    2. Intubated for airway protection  -, RR 25, FIO2 100, PEEP 5  -Serial ABGs    3. Possible aspiration PNA  -Patient coughing with feeding, hx of dementia  -C/w vanc, zosyn, azithro (12/15-12/21)    #GI:  1. Possible stercoral colitis, transmural wall thickening  - Bowel regimen, s/p disimpaction   - Continue with Zosyn     2. Transaminitis, likely shock liver  - Maintain MAP 65  - Trend CMP    #/Renal:  1. DENISE, likely pre-renal v ATN  - Maintain MAP > 65  - Continue to monitor UOP    2. Lactic acidosis, intubated and paralyzed  -Continue to maintain perfusion with pressors  -Trend ABG    3. Hyperkalemia  - s/p albuterol and regular insulin  - No lasix, Lokelma given   - Continue to monitor BMP     # Endo/Rheum:  Hyperglycemia  - Continue with ISS    #Skin  S/p skin biopsy on Wednesday on RUE  - Routine wound dressing    #Heme/onc:  1. Leukocytosis  - Likely in the setting of septic shock   - Continue with empiric antibiotics     #ID:  Septic shock  - Likely secondary to aspiration pneumonia vs stercoral colitis vs UTI  - Continue with empiric coverage with Zosyn and Azithromycin   - Pending blood and urine culture, urine legionella    # DVT ppx  - heparin drip     # Code status  - Patient is DNR  - Palliative consult placed     Mila Back PGY-3  Internal medicine MICU

## 2019-12-15 NOTE — CHART NOTE - NSCHARTNOTEFT_GEN_A_CORE
Verbally discussed the case with cardiology fellow on call.  Patient with ST elevations on EKG but unclear if patient meets criteria for STEMI as has paced rhythm.   Elevated troponins >10K x2.  Patient aspirin loaded and started on Plavix 75mg and heparin ggt.  Currently not an ideal candidate for catherization as pt also has septic shock.  Cardiology fellow in agreement.     Seven Nava, PGY 2  [phone]

## 2019-12-15 NOTE — CONSULT NOTE ADULT - SUBJECTIVE AND OBJECTIVE BOX
All Cardiology service information can be found 24/7 on [email or web address], password: [email or web address]    Patient seen and evaluated at bedside    Chief Complaint: AMS    HPI:  89 F w/ multiple medical comorbidities including dementia and chronic UTI p/w AMS over the past few days. Pt at baseline minimally verbal and dependent on all ADLs. Intubated in ER for airway protection.     PMHx:   Pacemaker  Syncope  Afib  CAD (Coronary Artery Disease)  Dyslipidemia  Benign Essential Hypertension      PSHx:   H/O dilation and curettage      Allergies:  No Known Allergies      Home Meds: Reviewed    Current Medications:   azithromycin  IVPB      azithromycin  IVPB 500 milliGRAM(s) IV Intermittent every 24 hours  chlorhexidine 0.12% Liquid 15 milliLiter(s) Oral Mucosa every 12 hours  chlorhexidine 4% Liquid 1 Application(s) Topical <User Schedule>  clopidogrel Tablet 75 milliGRAM(s) Oral daily  dextrose 40% Gel 15 Gram(s) Oral once PRN  dextrose 5%. 1000 milliLiter(s) IV Continuous <Continuous>  dextrose 50% Injectable 12.5 Gram(s) IV Push once  dextrose 50% Injectable 25 Gram(s) IV Push once  dextrose 50% Injectable 25 Gram(s) IV Push once  glucagon  Injectable 1 milliGRAM(s) IntraMuscular once PRN  heparin  Infusion.  Unit(s)/Hr IV Continuous <Continuous>  heparin  Injectable 4000 Unit(s) IV Push every 6 hours PRN  insulin lispro (HumaLOG) corrective regimen sliding scale   SubCutaneous every 6 hours  norepinephrine Infusion 0.1 MICROgram(s)/kG/Min IV Continuous <Continuous>  piperacillin/tazobactam IVPB.. 3.375 Gram(s) IV Intermittent every 12 hours  propofol Infusion 10 MICROgram(s)/kG/Min IV Continuous <Continuous>      FAMILY HISTORY:  Family history of schizophrenia (Child)  Family history of Alzheimer's disease (Sibling)      Social History:  Unable to obtain given mental status     REVIEW OF SYSTEMS:  Unable to obtain     Physical Exam:  T(F): 99.7 (12-15), Max: 101.2 (12-14)  HR: 68 (12-15) (59 - 109)  BP: 94/55 (12-15) (83/62 - 160/61)  RR: 25 (12-15)  SpO2: 100% (12-15)  GENERAL: intubated and sedated   HEAD:  Atraumatic, Normocephalic  ENT: EOMI, PERRLA, conjunctiva and sclera clear,  CHEST/LUNG: Clear to auscultation bilaterally; No wheeze, equal breath sounds bilaterally   BACK: No spinal tenderness  HEART: S1, S2, tachy   ABDOMEN: Soft, Nontender, Nondistended; Bowel sounds present  EXTREMITIES:  No clubbing, cyanosis, or edema  NEUROLOGY: sedated  SKIN: Normal color, No rashes or lesions  LINES:    Cardiovascular Diagnostic Testing:    ECG: Personally reviewed: V-paced w/ dynamic changes    Labs: Personally reviewed                        14.3   25.14 )-----------( 170      ( 15 Dec 2019 00:17 )             44.7     12-15    131<L>  |  101  |  46<H>  ----------------------------<  397<H>  6.3<HH>   |  11<L>  |  2.57<H>    Ca    8.7      15 Dec 2019 00:17  Phos  5.8     12-15  Mg     2.0     12-15    TPro  6.5  /  Alb  3.2<L>  /  TBili  0.6  /  DBili  x   /  AST  1869<H>  /  ALT  1356<H>  /  AlkPhos  87  12-15    PT/INR - ( 15 Dec 2019 00:17 )   PT: 16.6 sec;   INR: 1.43 ratio         PTT - ( 15 Dec 2019 00:17 )  PTT:32.1 sec    CARDIAC MARKERS ( 15 Dec 2019 00:17 )  >28286 ng/L / x     / x     / 3804 U/L / x     / 349.0 ng/mL  CARDIAC MARKERS ( 14 Dec 2019 21:36 )  >58713 ng/L / x     / x     / 2123 U/L / x     / 220.1 ng/mL

## 2019-12-15 NOTE — PROGRESS NOTE ADULT - SUBJECTIVE AND OBJECTIVE BOX
CHIEF COMPLAINT: AMS    Hospital course: Ms. Seay is a 89 year old woman with history for chronic UTI, wheel chair bound, dementia, Afib, BiV pacemaker, HLD, gallstone pancreatitis p/w AMS. She has had nasal congestion, rhinorrhea and cough with and without eating. She had one episode of diarrhea, nausea, NBBB emesis today.  She recently underwent biopsy of R arm for concern of cancer with dermatology and she was started on prophylactic valtrex. At baseline, she is wheel chair bound, minimally verbal but able to say "yes" or "no." She is incontinent. She requires ATC aides for ADLs. In the ED, tmax 101.2, HR 60-70, BP 83/62, O2 100% on nonrebreather. She was intubated ketamine, rocuronium, and started on fentanyl drip    Interval Events: Patient sedated on propofol on levophed for pressor support. Patient found to have elevated troponin of >18299. Initial lactate 10.2-->6.3. Patient started on aspirin and Plavix and heparin drip.     REVIEW OF SYSTEMS:  Intubated and sedation      OBJECTIVE:  ICU Vital Signs Last 24 Hrs  T(C): 37.6 (15 Dec 2019 04:00), Max: 38.4 (14 Dec 2019 17:16)  T(F): 99.7 (15 Dec 2019 04:00), Max: 101.2 (14 Dec 2019 17:16)  HR: 68 (15 Dec 2019 06:45) (59 - 109)  BP: 94/55 (15 Dec 2019 06:45) (83/62 - 160/61)  BP(mean): 70 (15 Dec 2019 06:45) (58 - 98)  ABP: --  ABP(mean): --  RR: 25 (15 Dec 2019 06:45) (18 - 27)  SpO2: 100% (15 Dec 2019 06:45) (91% - 100%)        CAPILLARY BLOOD GLUCOSE      POCT Blood Glucose.: 354 mg/dL (15 Dec 2019 06:13)      PHYSICAL EXAM:  General: Intubated, paralyzed, sedated. Resting without spontaneous movements.  	HEENT: NCAT.  PERRL.   	Neck: Supple.  Full ROM.  No JVD. No cervical lymphadenopathy.  	Heart: Irregularly irregular. Normal S1 and S2.  No murmurs, rubs, or gallops.   	Lungs: Bilateral bronchial breath sounds with transmitted upper airway sounds   	Abdomen: BS+, soft, NT/ND.  No organomegaly.  	Skin: Dressed right upper extremity. There is no purulence. There is asymmetric hyperpigmentation.  	Extremities: No edema, clubbing, or cyanosis.  2+ peripheral pulses b/l.  	Musculoskeletal: No deformities.  No spinal or paraspinal tenderness.  Neuro: Does not alert to noxious stimuli. Weak gag reflex. PERRL.    LINES:    HOSPITAL MEDICATIONS:  clopidogrel Tablet 75 milliGRAM(s) Oral daily  heparin  Infusion.  Unit(s)/Hr IV Continuous <Continuous>    azithromycin  IVPB      azithromycin  IVPB 500 milliGRAM(s) IV Intermittent every 24 hours  piperacillin/tazobactam IVPB.. 3.375 Gram(s) IV Intermittent every 12 hours    norepinephrine Infusion 0.1 MICROgram(s)/kG/Min IV Continuous <Continuous>    dextrose 40% Gel 15 Gram(s) Oral once PRN  dextrose 50% Injectable 12.5 Gram(s) IV Push once  dextrose 50% Injectable 25 Gram(s) IV Push once  dextrose 50% Injectable 25 Gram(s) IV Push once  glucagon  Injectable 1 milliGRAM(s) IntraMuscular once PRN  insulin lispro (HumaLOG) corrective regimen sliding scale   SubCutaneous every 6 hours      propofol Infusion 10 MICROgram(s)/kG/Min IV Continuous <Continuous>          dextrose 5%. 1000 milliLiter(s) IV Continuous <Continuous>      chlorhexidine 0.12% Liquid 15 milliLiter(s) Oral Mucosa every 12 hours  chlorhexidine 4% Liquid 1 Application(s) Topical <User Schedule>        LABS:                        14.3   25.14 )-----------( 170      ( 15 Dec 2019 00:17 )             44.7     Hgb Trend: 14.3<--, 12.8<--, 15.5<--  12-15    131<L>  |  101  |  46<H>  ----------------------------<  397<H>  6.3<HH>   |  11<L>  |  2.57<H>    Ca    8.7      15 Dec 2019 00:17  Phos  5.8     12-15  Mg     2.0     12-15    TPro  6.5  /  Alb  3.2<L>  /  TBili  0.6  /  DBili  x   /  AST  1869<H>  /  ALT  1356<H>  /  AlkPhos  87  12-15    Creatinine Trend: 2.57<--, 2.40<--, 0.73<--, 2.27<--  PT/INR - ( 15 Dec 2019 00:17 )   PT: 16.6 sec;   INR: 1.43 ratio         PTT - ( 15 Dec 2019 00:17 )  PTT:32.1 sec  Urinalysis Basic - ( 15 Dec 2019 00:17 )    Color: Orange / Appearance: Turbid / S.023 / pH: x  Gluc: x / Ketone: Trace  / Bili: Negative / Urobili: Negative   Blood: x / Protein: 300 mg/dL / Nitrite: Negative   Leuk Esterase: Large / RBC: 49 /hpf /  /HPF   Sq Epi: x / Non Sq Epi: 14 /hpf / Bacteria: Negative        RADIOLOGY:  < from: CT Abdomen and Pelvis No Cont (19 @ 23:15) >  FINDINGS:    CHEST:     LUNGS AND LARGE AIRWAYS: Endotracheal tube terminates approximately 4.5   cm above the geoff.  Central airways are patent.  The lungs are motion   degraded.  Bibasilar subsegmental atelectasis.  PLEURA: Moderate pleural effusions bilaterally.  VESSELS: Atherosclerotic calcifications of the thoracic aorta and arch   vessels.  HEART: Heart appears mildly enlarged.  No pericardial effusion.    Pacemaker leads terminate in the right atrium and right ventricle.    Scattered atherosclerotic calcifications of the coronary arteries.  Heavy   calcification of the sinuses of Valsalva and moderate calcification of   the aortic valve leaflets.  Dense dystrophic calcification of the mitral   annulus.  MEDIASTINUM AND JACOB: No gross lymphadenopathy.  The jacob are   suboptimally assessed without intravenous contrast.  CHEST WALL AND LOWER NECK: The thyroid is partially obscured by streak   artifact.  Heterogeneous left thyroid with nodules measuring up to 1.5 cm   and a punctate calcification the left thyroid lobe appears stable from   2018.  Pacemaker battery in the left chest wall.  Nonspecific   nodular breast parenchyma parenchyma in the retroareolar regions   bilaterally is unchanged from 2018.    ABDOMEN AND PELVIS:    LIVER: Within normal limits.  BILE DUCTS: Normal caliber.  GALLBLADDER: Cholelithiasis.  Diffuse gallbladder wall thickening with   intramural edema.  SPLEEN: Within normal limits.  PANCREAS: Within normal limits.  ADRENALS: Within normal limits.  KIDNEYS/URETERS: Both kidneys appear mildly atrophic.  No hydronephrosis   or renal stones.    BLADDER: Underdistended bladder.  Diffuse bladder wall thickening with   perivesicular inflammatory change and punctate focus of intraluminal gas.  REPRODUCTIVE ORGANS: Approximately 2.8 x 1.7 cm left adnexal cystic   lesion with peripheral calcifications is not significantly changed from   2018.    BOWEL: Enteric tube terminates in the stomach, approximately 8 cm below   theGE junction.  No bowel obstruction.  Normal appendix.  The rectum is   distended with stool to approximately 9 cm.  There is mild perirectal   inflammatory change  PERITONEUM: Mild abdominal and pelvic ascites.  VESSELS: Atherosclerotic changes.  RETROPERITONEUM/LYMPH NODES: No lymphadenopathy.    ABDOMINAL WALL: Approximately 5 cm fat-containing umbilical hernia with   an approximately 1.5 cm wide neck contains trace fluid.  BONES: The bones are diffusely osteopenic.  Mild central endplate   fracture in the superior endplate of L3 and moderate L4 compression   fracture appears stable 2018. Degenerative changes throughout the   thoracic and lumbar spine.  Moderate to severe spinal canal stenosis at   L3-L4.      IMPRESSION:   CT chest:  1.  Endotracheal tube in satisfactory position.  2.  Moderate pleural effusions bilaterally.  Subsegmental atelectasis in   both lower lobes.  No gross CT evidence of pneumonia.    CT abdomen/pelvis:  1.  Enteric tube terminates in the stomach, approximately 8 cm below the   GE junction.  This may be advanced as clinically warranted.  2. Rectum is distended with stool to approximately 9 cm and there are   mild perirectal inflammatory changes.  The findings are suspicious for   fecal impaction andstercoral colitis.  Impaction is recommended.  3.  Bladder wall thickening with perivesical inflammatory change and   punctate focus of intraluminal gas, suspicious for cystitis.  Correlate   with clinical symptoms and urinalysis.  4.  Cholelithiasis.  Diffuse gallbladder wall thickening with intramural   edema which is nonspecific and may be due to heart failure/volume   overload versus acute cholecystitis.  Right upper quadrant ultrasound may   be obtained for further evaluation.  5.  Mild abdominal and pelvic ascites.    < end of copied text >

## 2019-12-15 NOTE — PATIENT PROFILE ADULT - FUNCTIONAL SCREEN CURRENT LEVEL: BATHING, MLM
Have Your Skin Lesions Been Treated?: not been treated Is This A New Presentation, Or A Follow-Up?: Skin Lesions 4 = completely dependent

## 2019-12-15 NOTE — CONSULT NOTE ADULT - ATTENDING COMMENTS
I examined Ms. eSay.  Her case was discussed with Dr. Moctezuma.  Given septic shock and multiorgan failure the risks of an invasive procedure appear to outweigh the benefits.  I agree with medical management at this time.

## 2019-12-15 NOTE — PATIENT PROFILE ADULT - IS PATIENT POST-MENOPAUSAL?
I will START or STAY ON the medications listed below when I get home from the hospital:    trazadone  --     -- Indication: For per pcp    morphine 15 mg oral capsule  --  by mouth , As Needed  -- Indication: For SPONDYLOSIS WITH MYELOPATHY LUMBAR REGION    morphine 12 hour extended release  -- 15 milligram(s) by mouth 2 times a day  -- Indication: For SPONDYLOSIS WITH MYELOPATHY LUMBAR REGION    Lyrica 75 mg oral capsule  -- 1 cap(s) by mouth 2 times a day  -- Indication: For lumbar radiculopathy    Cymbalta 60 mg oral delayed release capsule  -- 1 cap(s) by mouth once a day  -- Indication: For per pcp    Toprol-XL  --  by mouth   -- Indication: For per pcp    PriLOSEC 40 mg oral delayed release capsule  -- 1 cap(s) by mouth once a day  -- Indication: For per pcp    Wellbutrin  --  by mouth   -- Indication: For per pcp
yes

## 2019-12-16 DIAGNOSIS — Z51.5 ENCOUNTER FOR PALLIATIVE CARE: ICD-10-CM

## 2019-12-16 DIAGNOSIS — Z71.89 OTHER SPECIFIED COUNSELING: ICD-10-CM

## 2019-12-16 DIAGNOSIS — A41.9 SEPSIS, UNSPECIFIED ORGANISM: ICD-10-CM

## 2019-12-16 DIAGNOSIS — R53.2 FUNCTIONAL QUADRIPLEGIA: ICD-10-CM

## 2019-12-16 LAB
ALBUMIN SERPL ELPH-MCNC: 2.4 G/DL — LOW (ref 3.3–5)
ALBUMIN SERPL ELPH-MCNC: 2.7 G/DL — LOW (ref 3.3–5)
ALBUMIN SERPL ELPH-MCNC: 2.7 G/DL — LOW (ref 3.3–5)
ALP SERPL-CCNC: 77 U/L — SIGNIFICANT CHANGE UP (ref 40–120)
ALP SERPL-CCNC: 81 U/L — SIGNIFICANT CHANGE UP (ref 40–120)
ALP SERPL-CCNC: 83 U/L — SIGNIFICANT CHANGE UP (ref 40–120)
ALT FLD-CCNC: 1345 U/L — HIGH (ref 10–45)
ALT FLD-CCNC: 1573 U/L — HIGH (ref 10–45)
ALT FLD-CCNC: 1869 U/L — HIGH (ref 10–45)
ANION GAP SERPL CALC-SCNC: 17 MMOL/L — SIGNIFICANT CHANGE UP (ref 5–17)
ANION GAP SERPL CALC-SCNC: 17 MMOL/L — SIGNIFICANT CHANGE UP (ref 5–17)
ANION GAP SERPL CALC-SCNC: 20 MMOL/L — HIGH (ref 5–17)
APTT BLD: 51.6 SEC — HIGH (ref 27.5–36.3)
APTT BLD: 56.9 SEC — HIGH (ref 27.5–36.3)
APTT BLD: 59.5 SEC — HIGH (ref 27.5–36.3)
AST SERPL-CCNC: 1343 U/L — HIGH (ref 10–40)
AST SERPL-CCNC: 657 U/L — HIGH (ref 10–40)
AST SERPL-CCNC: 929 U/L — HIGH (ref 10–40)
BASE EXCESS BLDA CALC-SCNC: -2.4 MMOL/L — LOW (ref -2–2)
BASE EXCESS BLDA CALC-SCNC: -2.4 MMOL/L — LOW (ref -2–2)
BILIRUB SERPL-MCNC: 0.5 MG/DL — SIGNIFICANT CHANGE UP (ref 0.2–1.2)
BILIRUB SERPL-MCNC: 0.6 MG/DL — SIGNIFICANT CHANGE UP (ref 0.2–1.2)
BILIRUB SERPL-MCNC: 0.6 MG/DL — SIGNIFICANT CHANGE UP (ref 0.2–1.2)
BUN SERPL-MCNC: 61 MG/DL — HIGH (ref 7–23)
BUN SERPL-MCNC: 62 MG/DL — HIGH (ref 7–23)
BUN SERPL-MCNC: 65 MG/DL — HIGH (ref 7–23)
CALCIUM SERPL-MCNC: 7.3 MG/DL — LOW (ref 8.4–10.5)
CALCIUM SERPL-MCNC: 8.1 MG/DL — LOW (ref 8.4–10.5)
CALCIUM SERPL-MCNC: 8.5 MG/DL — SIGNIFICANT CHANGE UP (ref 8.4–10.5)
CHLORIDE SERPL-SCNC: 95 MMOL/L — LOW (ref 96–108)
CHLORIDE SERPL-SCNC: 98 MMOL/L — SIGNIFICANT CHANGE UP (ref 96–108)
CHLORIDE SERPL-SCNC: 99 MMOL/L — SIGNIFICANT CHANGE UP (ref 96–108)
CK MB CFR SERPL CALC: 104.9 NG/ML — HIGH (ref 0–3.8)
CK MB CFR SERPL CALC: 176.6 NG/ML — HIGH (ref 0–3.8)
CK MB CFR SERPL CALC: 65.3 NG/ML — HIGH (ref 0–3.8)
CO2 BLDA-SCNC: 19 MMOL/L — LOW (ref 22–30)
CO2 BLDA-SCNC: 20 MMOL/L — LOW (ref 22–30)
CO2 SERPL-SCNC: 16 MMOL/L — LOW (ref 22–31)
CO2 SERPL-SCNC: 17 MMOL/L — LOW (ref 22–31)
CO2 SERPL-SCNC: 19 MMOL/L — LOW (ref 22–31)
CREAT SERPL-MCNC: 3.9 MG/DL — HIGH (ref 0.5–1.3)
CREAT SERPL-MCNC: 3.94 MG/DL — HIGH (ref 0.5–1.3)
CREAT SERPL-MCNC: 3.97 MG/DL — HIGH (ref 0.5–1.3)
GAS PNL BLDA: SIGNIFICANT CHANGE UP
GLUCOSE BLDC GLUCOMTR-MCNC: 131 MG/DL — HIGH (ref 70–99)
GLUCOSE BLDC GLUCOMTR-MCNC: 160 MG/DL — HIGH (ref 70–99)
GLUCOSE BLDC GLUCOMTR-MCNC: 166 MG/DL — HIGH (ref 70–99)
GLUCOSE BLDC GLUCOMTR-MCNC: 173 MG/DL — HIGH (ref 70–99)
GLUCOSE BLDC GLUCOMTR-MCNC: 380 MG/DL — HIGH (ref 70–99)
GLUCOSE SERPL-MCNC: 167 MG/DL — HIGH (ref 70–99)
GLUCOSE SERPL-MCNC: 185 MG/DL — HIGH (ref 70–99)
GLUCOSE SERPL-MCNC: 192 MG/DL — HIGH (ref 70–99)
GRAM STN FLD: SIGNIFICANT CHANGE UP
HCO3 BLDA-SCNC: 19 MMOL/L — LOW (ref 21–29)
HCO3 BLDA-SCNC: 19 MMOL/L — LOW (ref 21–29)
HCT VFR BLD CALC: 35.9 % — SIGNIFICANT CHANGE UP (ref 34.5–45)
HGB BLD-MCNC: 12.5 G/DL — SIGNIFICANT CHANGE UP (ref 11.5–15.5)
HOROWITZ INDEX BLDA+IHG-RTO: 35 — SIGNIFICANT CHANGE UP
HOROWITZ INDEX BLDA+IHG-RTO: 35 — SIGNIFICANT CHANGE UP
INR BLD: 1.34 RATIO — HIGH (ref 0.88–1.16)
INR BLD: 1.45 RATIO — HIGH (ref 0.88–1.16)
INTUBATED: SIGNIFICANT CHANGE UP
INTUBATED: SIGNIFICANT CHANGE UP
MAGNESIUM SERPL-MCNC: 1.8 MG/DL — SIGNIFICANT CHANGE UP (ref 1.6–2.6)
MAGNESIUM SERPL-MCNC: 1.9 MG/DL — SIGNIFICANT CHANGE UP (ref 1.6–2.6)
MAGNESIUM SERPL-MCNC: 2 MG/DL — SIGNIFICANT CHANGE UP (ref 1.6–2.6)
MCHC RBC-ENTMCNC: 30.3 PG — SIGNIFICANT CHANGE UP (ref 27–34)
MCHC RBC-ENTMCNC: 34.8 GM/DL — SIGNIFICANT CHANGE UP (ref 32–36)
MCV RBC AUTO: 86.9 FL — SIGNIFICANT CHANGE UP (ref 80–100)
NRBC # BLD: 0 /100 WBCS — SIGNIFICANT CHANGE UP (ref 0–0)
PCO2 BLDA: 23 MMHG — LOW (ref 32–46)
PCO2 BLDA: 26 MMHG — LOW (ref 32–46)
PH BLDA: 7.49 — HIGH (ref 7.35–7.45)
PH BLDA: 7.52 — HIGH (ref 7.35–7.45)
PHOSPHATE SERPL-MCNC: 3.3 MG/DL — SIGNIFICANT CHANGE UP (ref 2.5–4.5)
PHOSPHATE SERPL-MCNC: 3.8 MG/DL — SIGNIFICANT CHANGE UP (ref 2.5–4.5)
PHOSPHATE SERPL-MCNC: 4 MG/DL — SIGNIFICANT CHANGE UP (ref 2.5–4.5)
PLATELET # BLD AUTO: 150 K/UL — SIGNIFICANT CHANGE UP (ref 150–400)
PO2 BLDA: 124 MMHG — HIGH (ref 74–108)
PO2 BLDA: 176 MMHG — HIGH (ref 74–108)
POTASSIUM SERPL-MCNC: 4.7 MMOL/L — SIGNIFICANT CHANGE UP (ref 3.5–5.3)
POTASSIUM SERPL-MCNC: 4.9 MMOL/L — SIGNIFICANT CHANGE UP (ref 3.5–5.3)
POTASSIUM SERPL-MCNC: 5 MMOL/L — SIGNIFICANT CHANGE UP (ref 3.5–5.3)
POTASSIUM SERPL-SCNC: 4.7 MMOL/L — SIGNIFICANT CHANGE UP (ref 3.5–5.3)
POTASSIUM SERPL-SCNC: 4.9 MMOL/L — SIGNIFICANT CHANGE UP (ref 3.5–5.3)
POTASSIUM SERPL-SCNC: 5 MMOL/L — SIGNIFICANT CHANGE UP (ref 3.5–5.3)
PROT SERPL-MCNC: 5.3 G/DL — LOW (ref 6–8.3)
PROT SERPL-MCNC: 5.6 G/DL — LOW (ref 6–8.3)
PROT SERPL-MCNC: 5.9 G/DL — LOW (ref 6–8.3)
PROTHROM AB SERPL-ACNC: 15.4 SEC — HIGH (ref 10–12.9)
PROTHROM AB SERPL-ACNC: 16.7 SEC — HIGH (ref 10–12.9)
RBC # BLD: 4.13 M/UL — SIGNIFICANT CHANGE UP (ref 3.8–5.2)
RBC # FLD: 13.2 % — SIGNIFICANT CHANGE UP (ref 10.3–14.5)
SAO2 % BLDA: 100 % — HIGH (ref 92–96)
SAO2 % BLDA: 99 % — HIGH (ref 92–96)
SODIUM SERPL-SCNC: 131 MMOL/L — LOW (ref 135–145)
SODIUM SERPL-SCNC: 133 MMOL/L — LOW (ref 135–145)
SODIUM SERPL-SCNC: 134 MMOL/L — LOW (ref 135–145)
SPECIMEN SOURCE: SIGNIFICANT CHANGE UP
TROPONIN T, HIGH SENSITIVITY RESULT: HIGH NG/L (ref 0–51)
TROPONIN T, HIGH SENSITIVITY RESULT: HIGH NG/L (ref 0–51)
WBC # BLD: 23.54 K/UL — HIGH (ref 3.8–10.5)
WBC # FLD AUTO: 23.54 K/UL — HIGH (ref 3.8–10.5)

## 2019-12-16 PROCEDURE — 99291 CRITICAL CARE FIRST HOUR: CPT

## 2019-12-16 PROCEDURE — 93010 ELECTROCARDIOGRAM REPORT: CPT

## 2019-12-16 PROCEDURE — 99223 1ST HOSP IP/OBS HIGH 75: CPT

## 2019-12-16 RX ORDER — INSULIN LISPRO 100/ML
VIAL (ML) SUBCUTANEOUS EVERY 6 HOURS
Refills: 0 | Status: DISCONTINUED | OUTPATIENT
Start: 2019-12-16 | End: 2019-12-17

## 2019-12-16 RX ADMIN — Medication 2: at 10:58

## 2019-12-16 RX ADMIN — CLOPIDOGREL BISULFATE 75 MILLIGRAM(S): 75 TABLET, FILM COATED ORAL at 12:25

## 2019-12-16 RX ADMIN — HEPARIN SODIUM 950 UNIT(S)/HR: 5000 INJECTION INTRAVENOUS; SUBCUTANEOUS at 03:08

## 2019-12-16 RX ADMIN — Medication 2: at 15:00

## 2019-12-16 RX ADMIN — Medication 81 MILLIGRAM(S): at 12:25

## 2019-12-16 RX ADMIN — HEPARIN SODIUM 1100 UNIT(S)/HR: 5000 INJECTION INTRAVENOUS; SUBCUTANEOUS at 17:30

## 2019-12-16 RX ADMIN — CHLORHEXIDINE GLUCONATE 1 APPLICATION(S): 213 SOLUTION TOPICAL at 05:22

## 2019-12-16 RX ADMIN — PIPERACILLIN AND TAZOBACTAM 25 GRAM(S): 4; .5 INJECTION, POWDER, LYOPHILIZED, FOR SOLUTION INTRAVENOUS at 17:27

## 2019-12-16 RX ADMIN — CHLORHEXIDINE GLUCONATE 15 MILLILITER(S): 213 SOLUTION TOPICAL at 18:30

## 2019-12-16 RX ADMIN — Medication 2: at 01:58

## 2019-12-16 RX ADMIN — PIPERACILLIN AND TAZOBACTAM 25 GRAM(S): 4; .5 INJECTION, POWDER, LYOPHILIZED, FOR SOLUTION INTRAVENOUS at 05:22

## 2019-12-16 RX ADMIN — Medication 2: at 18:29

## 2019-12-16 RX ADMIN — CHLORHEXIDINE GLUCONATE 15 MILLILITER(S): 213 SOLUTION TOPICAL at 05:23

## 2019-12-16 NOTE — DIETITIAN INITIAL EVALUATION ADULT. - ENERGY NEEDS
Estimated calorie needs for intubated pt per [employer] ([employer]) 2003  1488cal/day (19kcals/kg)   Ht: 66"   Wt: 166   BMI: 26.9 kg/m2   IBW: 130 (+/-10%)     127% IBW  Edema: 1+ generalized (12/14)        Skin: no pressure injuries documented

## 2019-12-16 NOTE — PROGRESS NOTE ADULT - ASSESSMENT
Patient is a 89 year old woman with a history significant for chronic UTI, wheel chair bound, dementia, Afib, BiV pacemaker, HLD, gallstone pancreatitis off all home medications presented with AMS found to have septic shock. Patient also found to have STEMI and in cardiogenic shock.     #Neuro:  - Sedated while intubated  -C/w Propofol    Dementia  -Baseline: Says yes and no and requires 24 hour aid. Wheel chair bound.    # CV:  1. STEMI  - Patient found to have ST elevations on EKG also with elevated troponin of >10 Kx2  - Cardiology consulted, but given patient's advanced age and dementia, patient not ideal candidate for cath   - Continue with Aspirin and Plavix and heparin drip     2. Septic shock   - Likely in the setting of UTI vs stercoral colitis vs aspiration pneumonia  - Continue with Zosyn for aspiration pneumonia coverage, Azithromycin for atypical coverage  - Pending blood culture and urine legionella    3. Afib  - Off anticoagulation at home  - BiV pacemaker, HR 70s  - Continue with heparin drip     # Pulm:  1. Bilateral pleural effusions  -Holding diuresis given DENISE and euvolemia  -Continue to reassess fluid status    2. Intubated for airway protection  -, RR 25, FIO2 100, PEEP 5  -Serial ABGs    3. Possible aspiration PNA  -Patient coughing with feeding, hx of dementia  -C/w vanc, zosyn, azithro (12/15-12/21)    #GI:  1. Possible stercoral colitis, transmural wall thickening  - Bowel regimen, s/p disimpaction   - Continue with Zosyn     2. Transaminitis, likely shock liver  - Maintain MAP 65  - Trend CMP    #/Renal:  1. DENISE, likely pre-renal v ATN  - Maintain MAP > 65  - Continue to monitor UOP    2. Lactic acidosis, intubated and paralyzed  -Continue to maintain perfusion with pressors  -Trend ABG    3. Hyperkalemia  - s/p albuterol and regular insulin  - No lasix, Lokelma given   - Continue to monitor BMP     # Endo/Rheum:  Hyperglycemia  - Continue with ISS    #Skin  S/p skin biopsy on Wednesday on RUE  - Routine wound dressing    #Heme/onc:  1. Leukocytosis  - Likely in the setting of septic shock   - Continue with empiric antibiotics     #ID:  Septic shock  - Likely secondary to aspiration pneumonia vs stercoral colitis vs UTI  - Continue with empiric coverage with Zosyn and Azithromycin   - Pending blood and urine culture, urine legionella    # DVT ppx  - heparin drip     # Code status  - Patient is DNR  - Palliative consult placed 89 year old woman with dementia, wheelchair bound, chronic UTI, Afib, BiV pacemaker, HLD, gallstone pancreatitis off all home medications presented with AMS admitted for septic shock 2/2 UTI and NSTEMI/cardiogenic shock.    #Neuro:  - Prop dc'ed 7pm 12/15  - Dementia, wheelchair bound, minimally verbal at baseline (says yes and no at baseline), requires 24 hour aide,  - Monitor mental status    # CV:  1. NSTEMI  - Patient found to have ST elevations on EKG not meeting Sgarbossa's criteria with elevated troponin of >10,000  - Cardiology consulted; patient not a candidate for cath given advanced age and poor clinical status  - Medical mgmt with asa, plavix, and heparin drip; dc hep gtt in AM no 12/17    2. Septic/cardiogenic shock   - Likely in the setting of UTI vs stercoral colitis vs aspiration pneumonia and NSTEMI  - c/w levo, titrate to maintain MAP >65  - c/w Zosyn for UTI vs aspiration pneumonia coverage    3. Afib  - Off anticoagulation at home  - Remains V paced with BiV pacemaker  - Continue with heparin drip for NSTEMI    # Pulm:  1. Intubated for airway protection  - wean vent support as tolerated  - monitor ABG    2. Possible aspiration PNA  - Patient coughing with feeding, hx of dementia  - c/w zosyn (12/15-12/21)    3. Bilateral pleural effusions  - Holding diuresis given DENISE and euvolemia  - Continue to monitor fluid status    #GI:  1. Possible stercoral colitis, transmural wall thickening on CT  - Bowel regimen, s/p disimpaction  - Continue with Zosyn   - Currently NPO, will need to clarify GOC    2. Transaminitis, likely shock liver  - Now downtrending  - Maintain MAP 65  - Trend CMP    #/Renal:  1. DENISE, likely both pre-renal and ATN in setting of septic shock  - Cr 3.9 from 2.2 on admission  - Maintain MAP > 65  - DC [place] today in setting of UTI    2. Lactic acidosis, intubated and paralyzed  - Downtrending  - Continue to maintain perfusion with pressors    # Endo/Rheum:  Hyperglycemia  - Continue with ISS    #Skin  S/p skin biopsy on Wednesday on RUE  - Routine wound dressing    #Heme/onc:  - Stable leukocytosis in the setting of septic shock    #ID:  Septic shock  - Likely secondary to UTI vs aspiration pneumonia vs stercoral colitis  - c/w Zosyn (12/15-)  - f/u UCx, GNR  - f/u BCx, NTD  - f/u sputum Cx in lab    # DVT ppx  - heparin drip     # Code status  - Patient is DNR  - Palliative consult placed

## 2019-12-16 NOTE — CONSULT NOTE ADULT - SUBJECTIVE AND OBJECTIVE BOX
HPI:  Ms. Seay is a 89 yo F with a PMH significant for chronic UTI, wheel chair bound, dementia, afib, BiV pacemaker, HLD, gallstone pancreatitis p/w AMS. She has had nasal congestion, rhionrrhea, and cough with and without eating. She had one episode of diarrhea, nausea, NBBB emesis today. Since last night, she has had decreased PO intake and verbalized vague complaints of not feeling well. Denies abdominal pain or dysuria but incontinent at baseline. Reports no wounds or ulceration.    She recently underwent biopsy of R arm for concern of cancer with dermatology and she was started on prophylactic valtrex.     At baseline, she is wheel chair bound, minimally verbal but able to say "yes" or "no." She is incontinent. She requires ATC aides for ADLs.     In the ED, tmax 101.2, HR 60-70, BP 83/62, O2 100% on nonrebreather. She was intubated ketamine, rocuronium, and started on fentanyl drip (14 Dec 2019 22:56)    PERTINENT PM/SXH:   Pacemaker  Syncope  Afib  CAD (Coronary Artery Disease)  Dyslipidemia  Benign Essential Hypertension    H/O dilation and curettage    FAMILY HISTORY:  Family history of schizophrenia (Child)  Family history of Alzheimer's disease (Sibling)    ITEMS NOT CHECKED ARE NOT PRESENT    SOCIAL HISTORY:   Significant other/partner[ ]  Children[ ]  Jew/Spirituality:  Substance hx:  [ ]   Tobacco hx:  [ ]   Alcohol hx: [ ]   Home Opioid hx:  [ ] I-Stop Reference No:  Living Situation: [ ]Home  [ ]Long term care  [ ]Rehab [ ]Other    ADVANCE DIRECTIVES:    DNR  Yes  MOLST  [ ]  Living Will  [ ]   DECISION MAKER(s):  [ ] Health Care Proxy(s)  [ ] Surrogate(s)  [ ] Guardian           Name(s): Phone Number(s):    BASELINE (I)ADL(s) (prior to admission):  [place]: [ ]Total  [ ] Moderate [ ]Dependent    Allergies    No Known Allergies    Intolerances    MEDICATIONS  (STANDING):  aspirin  chewable 81 milliGRAM(s) Oral daily  chlorhexidine 0.12% Liquid 15 milliLiter(s) Oral Mucosa every 12 hours  chlorhexidine 4% Liquid 1 Application(s) Topical <User Schedule>  clopidogrel Tablet 75 milliGRAM(s) Oral daily  dextrose 5%. 1000 milliLiter(s) (50 mL/Hr) IV Continuous <Continuous>  dextrose 50% Injectable 12.5 Gram(s) IV Push once  dextrose 50% Injectable 25 Gram(s) IV Push once  dextrose 50% Injectable 25 Gram(s) IV Push once  heparin  Infusion.  Unit(s)/Hr (9.5 mL/Hr) IV Continuous <Continuous>  insulin lispro (HumaLOG) corrective regimen sliding scale   SubCutaneous every 4 hours  norepinephrine Infusion 0.1 MICROgram(s)/kG/Min (14.175 mL/Hr) IV Continuous <Continuous>  piperacillin/tazobactam IVPB.. 3.375 Gram(s) IV Intermittent every 12 hours  propofol Infusion 10 MICROgram(s)/kG/Min (4.536 mL/Hr) IV Continuous <Continuous>    MEDICATIONS  (PRN):  dextrose 40% Gel 15 Gram(s) Oral once PRN Blood Glucose LESS THAN 70 milliGRAM(s)/deciliter  glucagon  Injectable 1 milliGRAM(s) IntraMuscular once PRN Glucose LESS THAN 70 milligrams/deciliter  heparin  Injectable 4000 Unit(s) IV Push every 6 hours PRN For aPTT less than 40    PRESENT SYMPTOMS: [ ]Unable to obtain due to poor mentation   Source if other than patient:  [ ]Family   [ ]Team     Pain: [ ]yes [ ]no  QOL impact -   Location -                    Aggravating factors -  Quality -  Radiation -  Timing-  Severity (0-10 scale):  Minimal acceptable level (0-10 scale):     PAIN AD Score:     [email or web address] (press ctrl +  left click to view)    Dyspnea:                           [ ]Mild [ ]Moderate [ ]Severe  Anxiety:                             [ ]Mild [ ]Moderate [ ]Severe  Fatigue:                             [ ]Mild [ ]Moderate [ ]Severe  Nausea:                             [ ]Mild [ ]Moderate [ ]Severe  Loss of appetite:              [ ]Mild [ ]Moderate [ ]Severe  Constipation:                    [ ]Mild [ ]Moderate [ ]Severe    Other Symptoms:  [ ]All other review of systems negative     Palliative Performance Status Version 2:         %    http://npcrc.org/files/news/palliative_performance_scale_ppsv2.pdf  PHYSICAL EXAM:  Vital Signs Last 24 Hrs  T(C): 37.7 (16 Dec 2019 10:00), Max: 38 (16 Dec 2019 00:00)  T(F): 99.9 (16 Dec 2019 10:00), Max: 100.4 (16 Dec 2019 00:00)  HR: 83 (16 Dec 2019 12:30) (0 - 105)  BP: 92/54 (16 Dec 2019 12:17) (84/50 - 138/73)  BP(mean): 68 (16 Dec 2019 12:17) (62 - 100)  RR: 24 (16 Dec 2019 12:30) (21 - 26)  SpO2: 100% (16 Dec 2019 12:30) (81% - 100%) I&O's Summary    15 Dec 2019 07:01  -  16 Dec 2019 07:00  --------------------------------------------------------  IN: 1687.6 mL / OUT: 85 mL / NET: 1602.6 mL    16 Dec 2019 07:01  -  16 Dec 2019 12:34  --------------------------------------------------------  IN: 105.1 mL / OUT: 20 mL / NET: 85.1 mL      GENERAL:  [ ]Alert  [ ]Oriented x   [ ]Lethargic  [ ]Cachexia  [ ]Unarousable  [ ]Verbal  [ ]Non-Verbal  Behavioral:   [ ] Anxiety  [ ] Delirium [ ] Agitation [ ] Other  HEENT:  [ ]Normal   [ ]Dry mouth   [ ]ET Tube/Trach  [ ]Oral lesions  PULMONARY:   [ ]Clear [ ]Tachypnea  [ ]Audible excessive secretions   [ ]Rhonchi        [ ]Right [ ]Left [ ]Bilateral  [ ]Crackles        [ ]Right [ ]Left [ ]Bilateral  [ ]Wheezing     [ ]Right [ ]Left [ ]Bilatera  [ ]Diminished breath sounds [ ]right [ ]left [ ]bilateral  CARDIOVASCULAR:    [ ]Regular [ ]Irregular [ ]Tachy  [ ]Manuelito [ ]Murmur [ ]Other  GASTROINTESTINAL:  [ ]Soft  [ ]Distended   [ ]+BS  [ ]Non tender [ ]Tender  [ ]PEG [ ]OGT/ NGT  Last BM:     GENITOURINARY:  [ ]Normal [ ] Incontinent   [ ]Oliguria/Anuria   [ ]Tony  MUSCULOSKELETAL:   [ ]Normal   [ ]Weakness  [ ]Bed/Wheelchair bound [ ]Edema  NEUROLOGIC:   [ ]No focal deficits  [ ]Cognitive impairment  [ ]Dysphagia [ ]Dysarthria [ ]Paresis [ ]Other   SKIN:   [ ]Normal    [ ]Rash  [ ]Pressure ulcer(s)       Present on admission [ ]y [ ]n    CRITICAL CARE:  [ ] Shock Present  [ ]Septic [ ]Cardiogenic [ ]Neurologic [ ]Hypovolemic  [ ]  Vasopressors [ ]  Inotropes   [ ]Respiratory failure present [ ]Mechanical ventilation [ ]Non-invasive ventilatory support [ ]High flow  [ ]Acute  [ ]Chronic [ ]Hypoxic  [ ]Hypercarbic [ ]Other  [ ]Other organ failure     LABS:                        12.5   23.54 )-----------( 150      ( 16 Dec 2019 02:28 )             35.9   12-16    134<L>  |  95<L>  |  65<H>  ----------------------------<  167<H>  4.9   |  19<L>  |  3.97<H>    Ca    8.1<L>      16 Dec 2019 08:47  Phos  3.8     12-16  Mg     1.9     12-16    TPro  5.6<L>  /  Alb  2.7<L>  /  TBili  0.6  /  DBili  x   /  AST  929<H>  /  ALT  1573<H>  /  AlkPhos  83  12-16  PT/INR - ( 16 Dec 2019 08:47 )   PT: 15.4 sec;   INR: 1.34 ratio         PTT - ( 16 Dec 2019 08:47 )  PTT:59.5 sec    Urinalysis Basic - ( 15 Dec 2019 00:17 )    Color: Orange / Appearance: Turbid / S.023 / pH: x  Gluc: x / Ketone: Trace  / Bili: Negative / Urobili: Negative   Blood: x / Protein: 300 mg/dL / Nitrite: Negative   Leuk Esterase: Large / RBC: 49 /hpf /  /HPF   Sq Epi: x / Non Sq Epi: 14 /hpf / Bacteria: Negative      RADIOLOGY & ADDITIONAL STUDIES:    PROTEIN CALORIE MALNUTRITION PRESENT: [ ]mild [ ]moderate [ ]severe [ ]underweight [ ]morbid obesity  https://www.andeal.org/vault/7419/web/files/ONC/Table_Clinical%20Characteristics%20to%20Document%20Malnutrition-White%20JV%20et%20al%124330.pdf    Height (cm): 167.6 (19 @ 23:15)  Weight (kg): 75.6 (19 @ 23:15)  BMI (kg/m2): 26.9 (19 @ 23:15)    [ ]PPSV2 < or = to 30% [ ]significant weight loss  [ ]poor nutritional intake  [ ]anasarca     Albumin, Serum: 2.7 g/dL (19 @ 08:47)   [ ]Artificial Nutrition      REFERRALS:   [ ]Chaplaincy  [ ]Hospice  [ ]Child Life  [ ]Social Work  [ ]Case management [ ]Holistic Therapy     Goals of Care Document: HPI:  Ms. Seay is a 89 yo F with a PMH significant for chronic UTI, wheel chair bound, dementia, afib, BiV pacemaker, HLD, gallstone pancreatitis p/w AMS. She has had nasal congestion, rhionrrhea, and cough with and without eating. She had one episode of diarrhea, nausea, NBBB emesis today. Since last night, she has had decreased PO intake and verbalized vague complaints of not feeling well. Denies abdominal pain or dysuria but incontinent at baseline. Reports no wounds or ulceration.    She recently underwent biopsy of R arm for concern of cancer with dermatology and she was started on prophylactic valtrex.     At baseline, she is wheel chair bound, minimally verbal but able to say "yes" or "no." She is incontinent. She requires ATC aides for ADLs.     In the ED, tmax 101.2, HR 60-70, BP 83/62, O2 100% on nonrebreather. She was intubated ketamine, rocuronium, and started on fentanyl drip (14 Dec 2019 22:56)  Palliative care consulted to assist with goals of care in patient with septic shock secondary to UTI and NSTEMI. Now intubated, sedated and on pressors. not a candidate for catheterization. eyal Cross is HCP along with patients second son who is alternate. second son flying in from out of town tomorrow.     PERTINENT PM/SXH:   Pacemaker  Syncope  Afib  CAD (Coronary Artery Disease)  Dyslipidemia  Benign Essential Hypertension    H/O dilation and curettage    FAMILY HISTORY:  Family history of schizophrenia (Child)  Family history of Alzheimer's disease (Sibling)    ITEMS NOT CHECKED ARE NOT PRESENT    SOCIAL HISTORY:   Significant other/partner[ ]  Children[ ]  Episcopalian/Spirituality:  Substance hx:  [ ]   Tobacco hx:  [ ]   Alcohol hx: [ ]   Home Opioid hx:  [ ] I-Stop Reference No:  Living Situation: [ ]Home  [ ]Long term care  [ ]Rehab [ ]Other    ADVANCE DIRECTIVES:    DNR  Yes  MOLST  [ ]  Living Will  [ ]   DECISION MAKER(s):  [x ] Health Care Proxy(s)  [ ] Surrogate(s)  [ ] Guardian           Name(s): Phone Number(s): willi Seay [phone]    BASELINE (I)ADL(s) (prior to admission):  [place]: [ ]Total  [ ] Moderate [x ]Dependent    Allergies    No Known Allergies    Intolerances    MEDICATIONS  (STANDING):  aspirin  chewable 81 milliGRAM(s) Oral daily  chlorhexidine 0.12% Liquid 15 milliLiter(s) Oral Mucosa every 12 hours  chlorhexidine 4% Liquid 1 Application(s) Topical <User Schedule>  clopidogrel Tablet 75 milliGRAM(s) Oral daily  dextrose 5%. 1000 milliLiter(s) (50 mL/Hr) IV Continuous <Continuous>  dextrose 50% Injectable 12.5 Gram(s) IV Push once  dextrose 50% Injectable 25 Gram(s) IV Push once  dextrose 50% Injectable 25 Gram(s) IV Push once  heparin  Infusion.  Unit(s)/Hr (9.5 mL/Hr) IV Continuous <Continuous>  insulin lispro (HumaLOG) corrective regimen sliding scale   SubCutaneous every 4 hours  norepinephrine Infusion 0.1 MICROgram(s)/kG/Min (14.175 mL/Hr) IV Continuous <Continuous>  piperacillin/tazobactam IVPB.. 3.375 Gram(s) IV Intermittent every 12 hours  propofol Infusion 10 MICROgram(s)/kG/Min (4.536 mL/Hr) IV Continuous <Continuous>    MEDICATIONS  (PRN):  dextrose 40% Gel 15 Gram(s) Oral once PRN Blood Glucose LESS THAN 70 milliGRAM(s)/deciliter  glucagon  Injectable 1 milliGRAM(s) IntraMuscular once PRN Glucose LESS THAN 70 milligrams/deciliter  heparin  Injectable 4000 Unit(s) IV Push every 6 hours PRN For aPTT less than 40    PRESENT SYMPTOMS: [x ]Unable to obtain due to poor mentation   Source if other than patient:  [ ]Family   [ ]Team     Pain: [ ]yes [ ]no  QOL impact -   Location -                    Aggravating factors -  Quality -  Radiation -  Timing-  Severity (0-10 scale):  Minimal acceptable level (0-10 scale):     PAIN AD Score: 0    [email or web address]/[email or web address] (press ctrl +  left click to view)    Dyspnea:                           [ ]Mild [ ]Moderate [ ]Severe  Anxiety:                             [ ]Mild [ ]Moderate [ ]Severe  Fatigue:                             [ ]Mild [ ]Moderate [ ]Severe  Nausea:                             [ ]Mild [ ]Moderate [ ]Severe  Loss of appetite:              [ ]Mild [ ]Moderate [ ]Severe  Constipation:                    [ ]Mild [ ]Moderate [ ]Severe    Other Symptoms:  [ ]All other review of systems negative     Palliative Performance Status Version 2:        10 %    http://Psychiatric.org/files/news/palliative_performance_scale_ppsv2.pdf  PHYSICAL EXAM:  Vital Signs Last 24 Hrs  T(C): 37.7 (16 Dec 2019 10:00), Max: 38 (16 Dec 2019 00:00)  T(F): 99.9 (16 Dec 2019 10:00), Max: 100.4 (16 Dec 2019 00:00)  HR: 83 (16 Dec 2019 12:30) (0 - 105)  BP: 92/54 (16 Dec 2019 12:17) (84/50 - 138/73)  BP(mean): 68 (16 Dec 2019 12:17) (62 - 100)  RR: 24 (16 Dec 2019 12:30) (21 - 26)  SpO2: 100% (16 Dec 2019 12:30) (81% - 100%) I&O's Summary    15 Dec 2019 07:01  -  16 Dec 2019 07:00  --------------------------------------------------------  IN: 1687.6 mL / OUT: 85 mL / NET: 1602.6 mL    16 Dec 2019 07:01  -  16 Dec 2019 12:34  --------------------------------------------------------  IN: 105.1 mL / OUT: 20 mL / NET: 85.1 mL      GENERAL:  [ ]Alert  [ ]Oriented x   [ ]Lethargic  [ ]Cachexia  [ x]Unarousable  [ ]Verbal  [ ]Non-Verbal  Behavioral:   [ ] Anxiety  [ ] Delirium [ ] Agitation [ ] Other  HEENT:  [ ]Normal   [ ]Dry mouth   [x ]ET Tube/Trach  [ ]Oral lesions  PULMONARY:   [ ]Clear [ ]Tachypnea  [ ]Audible excessive secretions   [ ]Rhonchi        [ ]Right [ ]Left [ ]Bilateral  [ x]Crackles        [ ]Right [ ]Left [ ]Bilateral  [ ]Wheezing     [ ]Right [ ]Left [ ]Bilatera  [ ]Diminished breath sounds [ ]right [ ]left [ ]bilateral  CARDIOVASCULAR:    [x ]Regular [ ]Irregular [ ]Tachy  [ ]Manuelito [ ]Murmur [ ]Other  GASTROINTESTINAL:  [x ]Soft  [ ]Distended   [x ]+BS  [ x]Non tender [ ]Tender  [ ]PEG [ ]OGT/ NGT  Last BM:     GENITOURINARY:  [ ]Normal [ ] Incontinent   [ ]Oliguria/Anuria   [x ]Tony  MUSCULOSKELETAL:   [ ]Normal   [ ]Weakness  [x ]Bed/Wheelchair bound [ ]Edema  NEUROLOGIC:   [ ]No focal deficits  [x ]Cognitive impairment  [ ]Dysphagia [ ]Dysarthria [ ]Paresis [ ]Other   SKIN: ecchymoses  [ ]Normal    [ ]Rash  [ ]Pressure ulcer(s)       Present on admission [ x]y [ ]n    CRITICAL CARE:  [x ] Shock Present  [ x]Septic [ ]Cardiogenic [ ]Neurologic [ ]Hypovolemic  [ ]  Vasopressors [ ]  Inotropes   [x ]Respiratory failure present [ ]Mechanical ventilation [ ]Non-invasive ventilatory support [ ]High flow  [x ]Acute  [ ]Chronic [x]Hypoxic  [ ]Hypercarbic [ ]Other  [ ]Other organ failure     LABS:                        12.5   23.54 )-----------( 150      ( 16 Dec 2019 02:28 )             35.9   12-16    134<L>  |  95<L>  |  65<H>  ----------------------------<  167<H>  4.9   |  19<L>  |  3.97<H>    Ca    8.1<L>      16 Dec 2019 08:47  Phos  3.8     12-16  Mg     1.9     12-16    TPro  5.6<L>  /  Alb  2.7<L>  /  TBili  0.6  /  DBili  x   /  AST  929<H>  /  ALT  1573<H>  /  AlkPhos  83  12-16  PT/INR - ( 16 Dec 2019 08:47 )   PT: 15.4 sec;   INR: 1.34 ratio         PTT - ( 16 Dec 2019 08:47 )  PTT:59.5 sec    Urinalysis Basic - ( 15 Dec 2019 00:17 )    Color: Orange / Appearance: Turbid / S.023 / pH: x  Gluc: x / Ketone: Trace  / Bili: Negative / Urobili: Negative   Blood: x / Protein: 300 mg/dL / Nitrite: Negative   Leuk Esterase: Large / RBC: 49 /hpf /  /HPF   Sq Epi: x / Non Sq Epi: 14 /hpf / Bacteria: Negative      RADIOLOGY & ADDITIONAL STUDIES:  < from: CT Abdomen and Pelvis No Cont (19 @ 23:15) >    EXAM:  CT ABDOMEN AND PELVIS                          EXAM:  CT CHEST                            PROCEDURE DATE:  2019            INTERPRETATION:  CLINICAL INFORMATION: Septic shock. Vomiting, abdominal   distention. Evaluate for source of infection.    COMPARISON: CT chest abdomen pelvis from 2018    PROCEDURE:   CT of the Chest, Abdomen and Pelvis was performed without intravenous   contrast.   Intravenous contrast: None.  Oral contrast: None.  Sagittal and coronal reformats were performed.    FINDINGS:    CHEST:     LUNGS AND LARGE AIRWAYS: Endotracheal tube terminates approximately 4.5   cm above the geoff.  Central airways are patent.  The lungs are motion   degraded.  Bibasilar subsegmental atelectasis.  PLEURA: Moderate pleural effusions bilaterally.  VESSELS: Atherosclerotic calcifications of the thoracic aorta and arch   vessels.  HEART: Heart appears mildly enlarged.  No pericardial effusion.    Pacemaker leads terminate in the right atrium and right ventricle.    Scattered atherosclerotic calcifications of the coronary arteries.  Heavy   calcification of the sinuses of Valsalva and moderate calcification of   the aortic valve leaflets.  Dense dystrophic calcification of the mitral   annulus.  MEDIASTINUM AND NAZANIN: No gross lymphadenopathy.  The nazanin are   suboptimally assessed without intravenous contrast.  CHEST WALL AND LOWER NECK: The thyroid is partially obscured by streak   artifact.  Heterogeneous left thyroid with nodules measuring up to 1.5 cm   and a punctate calcification the left thyroid lobe appears stable from   2018.  Pacemaker battery in the left chest wall.  Nonspecific   nodular breast parenchyma parenchyma in the retroareolar regions   bilaterally is unchanged from 2018.    ABDOMEN AND PELVIS:    LIVER: Within normal limits.  BILE DUCTS: Normal caliber.  GALLBLADDER: Cholelithiasis.  Diffuse gallbladder wall thickening with   intramural edema.  SPLEEN: Within normal limits.  PANCREAS: Within normal limits.  ADRENALS: Within normal limits.  KIDNEYS/URETERS: Both kidneys appear mildly atrophic.  No hydronephrosis   or renal stones.    BLADDER: Underdistended bladder.  Diffuse bladder wall thickening with   perivesicular inflammatory change and punctate focus of intraluminal gas.  REPRODUCTIVE ORGANS: Approximately 2.8 x 1.7 cm left adnexal cystic   lesion with peripheral calcifications is not significantly changed from   2018.    BOWEL: Enteric tube terminates in the stomach, approximately 8 cm below   theGE junction.  No bowel obstruction.  Normal appendix.  The rectum is   distended with stool to approximately 9 cm.  There is mild perirectal   inflammatory change  PERITONEUM: Mild abdominal and pelvic ascites.  VESSELS: Atherosclerotic changes.  RETROPERITONEUM/LYMPH NODES: No lymphadenopathy.    ABDOMINAL WALL: Approximately 5 cm fat-containing umbilical hernia with   an approximately 1.5 cm wide neck contains trace fluid.  BONES: The bones are diffusely osteopenic.  Mild central endplate   fracture in the superior endplate of L3 and moderate L4 compression   fracture appears stable 2018. Degenerative changes throughout the   thoracic and lumbar spine.  Moderate to severe spinal canal stenosis at   L3-L4.      IMPRESSION:   CT chest:  1.  Endotracheal tube in satisfactory position.  2.  Moderate pleural effusions bilaterally.  Subsegmental atelectasis in   both lower lobes.  No gross CT evidence of pneumonia.    CT abdomen/pelvis:  1.  Enteric tube terminates in the stomach, approximately 8 cm below the   GE junction.  This may be advanced as clinically warranted.  2. Rectum is distended with stool to approximately 9 cm and there are   mild perirectal inflammatory changes.  The findings are suspicious for   fecal impaction andstercoral colitis.  Impaction is recommended.  3.  Bladder wall thickening with perivesical inflammatory change and   punctate focus of intraluminal gas, suspicious for cystitis.  Correlate   with clinical symptoms and urinalysis.  4.  Cholelithiasis.  Diffuse gallbladder wall thickening with intramural   edema which is nonspecific and may be due to heart failure/volume   overload versus acute cholecystitis.  Right upper quadrant ultrasound may   be obtained for further evaluation.  5.  Mild abdominal and pelvic ascites.    BETITO SKINNER M.D., RADIOLOGY RESIDENT  This document has been electronically signed.  MADAI DHALIWAL M.D.,ATTENDING RADIOLOGIST  This document has been electronically signed. Dec 15 2019 12:58AM      < end of copied text >    PROTEIN CALORIE MALNUTRITION PRESENT: [ ]mild [x ]moderate [ ]severe [ ]underweight [ ]morbid obesity  https://www.andeal.org/vault/2440/web/files/ONC/Table_Clinical%20Characteristics%20to%20Document%20Malnutrition-White%20JV%20et%20al%2020.pdf    Height (cm): 167.6 (19 @ 23:15)  Weight (kg): 75.6 (19 @ 23:15)  BMI (kg/m2): 26.9 (19 @ 23:15)    [x ]PPSV2 < or = to 30% [ ]significant weight loss  [x ]poor nutritional intake  [ ]anasarca     Albumin, Serum: 2.7 g/dL (19 @ 08:47)   [ ]Artificial Nutrition      REFERRALS:   [ ]Chaplaincy  [ ]Hospice  [ ]Child Life  [x ]Social Work  [ ]Case management [ ]Holistic Therapy     Goals of Care Document:

## 2019-12-16 NOTE — DIETITIAN INITIAL EVALUATION ADULT. - ENTERAL
If EN initiated, recommend Glucerna 1.2 at 75 cc/hr x 18 hrs provides 1620 cals, 81 gm protein, 1087cc free water  meets 21 Kcal/Kg, 1.1Gm/kg dosing wt 75.6 kg

## 2019-12-16 NOTE — DIETITIAN INITIAL EVALUATION ADULT. - PERTINENT MEDS FT
MEDICATIONS  (STANDING):  aspirin  chewable 81 milliGRAM(s) Oral daily  chlorhexidine 0.12% Liquid 15 milliLiter(s) Oral Mucosa every 12 hours  chlorhexidine 4% Liquid 1 Application(s) Topical <User Schedule>  clopidogrel Tablet 75 milliGRAM(s) Oral daily  dextrose 5%. 1000 milliLiter(s) (50 mL/Hr) IV Continuous <Continuous>  dextrose 50% Injectable 12.5 Gram(s) IV Push once  dextrose 50% Injectable 25 Gram(s) IV Push once  dextrose 50% Injectable 25 Gram(s) IV Push once  heparin  Infusion.  Unit(s)/Hr (9.5 mL/Hr) IV Continuous <Continuous>  insulin lispro (HumaLOG) corrective regimen sliding scale   SubCutaneous every 4 hours  norepinephrine Infusion 0.1 MICROgram(s)/kG/Min (14.175 mL/Hr) IV Continuous <Continuous>  piperacillin/tazobactam IVPB.. 3.375 Gram(s) IV Intermittent every 12 hours  propofol Infusion 10 MICROgram(s)/kG/Min (4.536 mL/Hr) IV Continuous <Continuous>    MEDICATIONS  (PRN):  dextrose 40% Gel 15 Gram(s) Oral once PRN Blood Glucose LESS THAN 70 milliGRAM(s)/deciliter  glucagon  Injectable 1 milliGRAM(s) IntraMuscular once PRN Glucose LESS THAN 70 milligrams/deciliter  heparin  Injectable 4000 Unit(s) IV Push every 6 hours PRN For aPTT less than 40

## 2019-12-16 NOTE — DIETITIAN INITIAL EVALUATION ADULT. - OTHER INFO
Pt seen for: MICU Length Of Stay   Adm dx: sepsis, MI     GI issues: no vomiting, no abd distention  Last BM: fecal incontinence 12/15    Food allergies/Intolerances: NKFA   Vit/supplement PTA: none noted    Diet PTA: unable to assess at this time      Subjective/Objective information: pt intubated, no visitors at bedside     Education: Not indicated at this time

## 2019-12-16 NOTE — CONSULT NOTE ADULT - ASSESSMENT
89 F w/ multiple medical comorbidities including dementia and chronic UTI a/w septic shock +/- cardiogenic shock.     #Cardiogenic Shock  -Given elderly age, comorbidities, and overall poor prognosis, favor conservative management as pt is not a candidate for invasive procedures.  -Agree w/ DAPT and hep gtt for now.  -Wean levo as tolerated   -Consider palliative eval     Noah Moctezuma PGY4  [phone]  All Cardiology service information can be found 24/7 on [email or web address], password: [email or web address]
Ms. Seay is a 89 yo F with a PMH significant for chronic UTI, wheel chair bound, dementia, afib, BiV pacemaker, HLD, gallstone pancreatitis p/w AMS. She has had nasal congestion, rhionrrhea, and cough with and without eating. Admitted for septic shock secondary to ?UTI. Now intubated, sedated. Palliative care consulted for GOC

## 2019-12-16 NOTE — GOALS OF CARE CONVERSATION - ADVANCED CARE PLANNING - CONVERSATION DETAILS
Palliative consulted for GOC due to advanced illness. SAKINA and Dr. Sanabria met with Patient's son/HCP Tay at bedside today. Patient presently intubated and unable to participate in consult.    Dr. Sanabria provided overview of Patient's current medical status, and inquired what Tay feels he, his brother (HCP alt.), and the Patient would wish for Patient's healthcare moving forward. Tay inquired about the various options for Patient moving forward, and states he feels he and his brother will likely choose to deescalate care and compassionately extubate, but Tay states his [place] is traveling from [place] tomorrow and would like to wait until he arrives to make any decisions.     Dr. Sanabria discussed transfer to the PCU, and Tay states he will likely be agreeable to transfer, but again wishes to wait until his brother arrives to [place] to make any decisions such as this.     Tay appears to be coping appropriately. Emotional support provided.     Palliative will continue to follow this case for ongoing GOC and support, and meet with Patient's two sons tomorrow afternoon to further discuss options.

## 2019-12-16 NOTE — CONSULT NOTE ADULT - PROBLEM SELECTOR RECOMMENDATION 3
HCP paperwork obtained and copy placed in chart  MOLST for DNR completed prior to this providers involvement  discussed PCU as an option for transition in focus of care. family considering options, awaiting arrival of second son, likely 12/17 pending weather

## 2019-12-16 NOTE — PROGRESS NOTE ADULT - SUBJECTIVE AND OBJECTIVE BOX
Navi Martin, PGY2   Pager [phone]    Hospital course: Ms. Seay is a 89 year old woman with history for chronic UTI, wheel chair bound, dementia, Afib, BiV pacemaker, HLD, gallstone pancreatitis p/w AMS. She has had nasal congestion, rhinorrhea and cough with and without eating. She had one episode of diarrhea, nausea, NBBB emesis today.  She recently underwent biopsy of R arm for concern of cancer with dermatology and she was started on prophylactic valtrex. At baseline, she is wheel chair bound, minimally verbal but able to say "yes" or "no." She is incontinent. She requires ATC aides for ADLs. In the ED, tmax 101.2, HR 60-70, BP 83/62, O2 100% on nonrebreather. She was intubated ketamine, rocuronium, and started on fentanyl drip    INTERVAL HPI/OVERNIGHT EVENTS:    SUBJECTIVE: Patient seen and examined at bedside.     CONSTITUTIONAL: No fevers, chills, weakness  HEENT: No headache, acute visual changes, throat pain  NECK: No pain or stiffness  RESPIRATORY: No sob, cough, wheezing, hemoptysis  CARDIOVASCULAR: No chest pain or palpitations  GASTROINTESTINAL: No abdominal pain, nausea, vomiting, constipation, or diarrhea  GENITOURINARY: No dysuria, frequency or hematuria  NEUROLOGICAL: No numbness or weakness  SKIN: No rash or itching    OBJECTIVE:    VITAL SIGNS:  ICU Vital Signs Last 24 Hrs  T(C): 37.9 (16 Dec 2019 04:00), Max: 38 (16 Dec 2019 00:00)  T(F): 100.2 (16 Dec 2019 04:00), Max: 100.4 (16 Dec 2019 00:00)  HR: 105 (16 Dec 2019 06:15) (0 - 105)  BP: 106/54 (16 Dec 2019 06:15) (87/51 - 138/73)  BP(mean): 78 (16 Dec 2019 06:15) (63 - 100)  ABP: --  ABP(mean): --  RR: 25 (16 Dec 2019 06:15) (21 - 26)  SpO2: 100% (16 Dec 2019 06:15) (81% - 100%)    Mode: AC/ CMV (Assist Control/ Continuous Mandatory Ventilation), RR (machine): 18, TV (machine): 400, FiO2: 35, PEEP: 5, ITime: 1, MAP: 8, PIP: 21     @ 07:01  -  12-15 @ 07:00  --------------------------------------------------------  IN: 537.5 mL / OUT: 30 mL / NET: 507.5 mL    12-15 @ 07: @ 06:29  --------------------------------------------------------  IN: 1650.3 mL / OUT: 85 mL / NET: 1565.3 mL      CAPILLARY BLOOD GLUCOSE      POCT Blood Glucose.: 131 mg/dL (16 Dec 2019 05:20)      PHYSICAL EXAM:  General: NAD  HEENT: NCAT, PERRL, clear conjunctiva, mmm  Neck: supple, no JVD  Respiratory: CTAB  Cardiovascular: RRR, S1S2, no m/r/g  Abdomen: soft, nontender, nondistended, normal bowel sounds  Extremities: no edema or cyanosis  Skin: normal color and turgor; no rash  Neurological: nonfocal    MEDICATIONS:  MEDICATIONS  (STANDING):  aspirin  chewable 81 milliGRAM(s) Oral daily  chlorhexidine 0.12% Liquid 15 milliLiter(s) Oral Mucosa every 12 hours  chlorhexidine 4% Liquid 1 Application(s) Topical <User Schedule>  clopidogrel Tablet 75 milliGRAM(s) Oral daily  dextrose 5%. 1000 milliLiter(s) (50 mL/Hr) IV Continuous <Continuous>  dextrose 50% Injectable 12.5 Gram(s) IV Push once  dextrose 50% Injectable 25 Gram(s) IV Push once  dextrose 50% Injectable 25 Gram(s) IV Push once  heparin  Infusion.  Unit(s)/Hr (9.5 mL/Hr) IV Continuous <Continuous>  insulin lispro (HumaLOG) corrective regimen sliding scale   SubCutaneous every 4 hours  norepinephrine Infusion 0.1 MICROgram(s)/kG/Min (14.175 mL/Hr) IV Continuous <Continuous>  piperacillin/tazobactam IVPB.. 3.375 Gram(s) IV Intermittent every 12 hours  propofol Infusion 10 MICROgram(s)/kG/Min (4.536 mL/Hr) IV Continuous <Continuous>    MEDICATIONS  (PRN):  dextrose 40% Gel 15 Gram(s) Oral once PRN Blood Glucose LESS THAN 70 milliGRAM(s)/deciliter  glucagon  Injectable 1 milliGRAM(s) IntraMuscular once PRN Glucose LESS THAN 70 milligrams/deciliter  heparin  Injectable 4000 Unit(s) IV Push every 6 hours PRN For aPTT less than 40      ALLERGIES:  Allergies    No Known Allergies    Intolerances        LABS:                        12.5   23.54 )-----------( 150      ( 16 Dec 2019 02:28 )             35.9     12-16    131<L>  |  98  |  61<H>  ----------------------------<  192<H>  5.0   |  16<L>  |  3.94<H>    Ca    8.5      16 Dec 2019 02:28  Phos  3.3     12-  Mg     2.0     12-16    TPro  5.9<L>  /  Alb  2.7<L>  /  TBili  0.6  /  DBili  x   /  AST  1343<H>  /  ALT  1869<H>  /  AlkPhos  77  12-16    PT/INR - ( 16 Dec 2019 02:28 )   PT: 16.7 sec;   INR: 1.45 ratio         PTT - ( 16 Dec 2019 02:28 )  PTT:56.9 sec  Urinalysis Basic - ( 15 Dec 2019 00:17 )    Color: Orange / Appearance: Turbid / S.023 / pH: x  Gluc: x / Ketone: Trace  / Bili: Negative / Urobili: Negative   Blood: x / Protein: 300 mg/dL / Nitrite: Negative   Leuk Esterase: Large / RBC: 49 /hpf /  /HPF   Sq Epi: x / Non Sq Epi: 14 /hpf / Bacteria: Negative        RADIOLOGY & ADDITIONAL TESTS: Reviewed. Navi Martin, PGY2   Pager [phone]    HPI: Ms. Seay is a 89 year old woman with history for chronic UTI, wheel chair bound, dementia, Afib, BiV pacemaker, HLD, gallstone pancreatitis p/w AMS. She has had nasal congestion, rhinorrhea and cough with and without eating. She had one episode of diarrhea, nausea, NBBB emesis today.  She recently underwent biopsy of R arm for concern of cancer with dermatology and she was started on prophylactic valtrex. At baseline, she is wheel chair bound, minimally verbal but able to say "yes" or "no." She is incontinent. She requires ATC aides for ADLs. In the ED, tmax 101.2, HR 60-70, BP 83/62, O2 100% on nonrebreather. She was intubated ketamine, rocuronium, and started on fentanyl drip    INTERVAL HPI/OVERNIGHT EVENTS: Prop dc'ed at 7pm on 12/15. Tachycardic to 120s overnight without significant ECG changes. Alkalotic overnight, bicarb gtt dc'ed and RR/TV decreased with improvement in alkalosis.    SUBJECTIVE: Patient seen and examined at bedside. Unresponsive to verbal stimuli.    ROS unable to be obtained    OBJECTIVE:    VITAL SIGNS:  ICU Vital Signs Last 24 Hrs  T(C): 37.9 (16 Dec 2019 04:00), Max: 38 (16 Dec 2019 00:00)  T(F): 100.2 (16 Dec 2019 04:00), Max: 100.4 (16 Dec 2019 00:00)  HR: 105 (16 Dec 2019 06:15) (0 - 105)  BP: 106/54 (16 Dec 2019 06:15) (87/51 - 138/73)  BP(mean): 78 (16 Dec 2019 06:15) (63 - 100)  ABP: --  ABP(mean): --  RR: 25 (16 Dec 2019 06:15) (21 - 26)  SpO2: 100% (16 Dec 2019 06:15) (81% - 100%)    Mode: AC/ CMV (Assist Control/ Continuous Mandatory Ventilation), RR (machine): 18, TV (machine): 400, FiO2: 35, PEEP: 5, ITime: 1, MAP: 8, PIP: 21    12-14 @ 07:01  -  12-15 @ 07:00  --------------------------------------------------------  IN: 537.5 mL / OUT: 30 mL / NET: 507.5 mL    12-15 @ 07:01  -   @ 06:29  --------------------------------------------------------  IN: 1650.3 mL / OUT: 85 mL / NET: 1565.3 mL      CAPILLARY BLOOD GLUCOSE      POCT Blood Glucose.: 131 mg/dL (16 Dec 2019 05:20)      PHYSICAL EXAM:  General: NAD  HEENT: NCAT, PERRL, clear conjunctiva, mmm  Neck: supple, no JVD  Respiratory: CTAB  Cardiovascular: RRR, S1S2, no m/r/g  Abdomen: soft, nontender, nondistended, normal bowel sounds  : Tony intact  Extremities: no edema or cyanosis  Skin: warm, perfused  Neurological: nonfocal    MEDICATIONS:  MEDICATIONS  (STANDING):  aspirin  chewable 81 milliGRAM(s) Oral daily  chlorhexidine 0.12% Liquid 15 milliLiter(s) Oral Mucosa every 12 hours  chlorhexidine 4% Liquid 1 Application(s) Topical <User Schedule>  clopidogrel Tablet 75 milliGRAM(s) Oral daily  dextrose 5%. 1000 milliLiter(s) (50 mL/Hr) IV Continuous <Continuous>  dextrose 50% Injectable 12.5 Gram(s) IV Push once  dextrose 50% Injectable 25 Gram(s) IV Push once  dextrose 50% Injectable 25 Gram(s) IV Push once  heparin  Infusion.  Unit(s)/Hr (9.5 mL/Hr) IV Continuous <Continuous>  insulin lispro (HumaLOG) corrective regimen sliding scale   SubCutaneous every 4 hours  norepinephrine Infusion 0.1 MICROgram(s)/kG/Min (14.175 mL/Hr) IV Continuous <Continuous>  piperacillin/tazobactam IVPB.. 3.375 Gram(s) IV Intermittent every 12 hours  propofol Infusion 10 MICROgram(s)/kG/Min (4.536 mL/Hr) IV Continuous <Continuous>    MEDICATIONS  (PRN):  dextrose 40% Gel 15 Gram(s) Oral once PRN Blood Glucose LESS THAN 70 milliGRAM(s)/deciliter  glucagon  Injectable 1 milliGRAM(s) IntraMuscular once PRN Glucose LESS THAN 70 milligrams/deciliter  heparin  Injectable 4000 Unit(s) IV Push every 6 hours PRN For aPTT less than 40      ALLERGIES:  Allergies    No Known Allergies    Intolerances        LABS:                        12.5   23.54 )-----------( 150      ( 16 Dec 2019 02:28 )             35.9     12-16    131<L>  |  98  |  61<H>  ----------------------------<  192<H>  5.0   |  16<L>  |  3.94<H>    Ca    8.5      16 Dec 2019 02:28  Phos  3.3     12-16  Mg     2.0     12-16    TPro  5.9<L>  /  Alb  2.7<L>  /  TBili  0.6  /  DBili  x   /  AST  1343<H>  /  ALT  1869<H>  /  AlkPhos  77  12-16    PT/INR - ( 16 Dec 2019 02:28 )   PT: 16.7 sec;   INR: 1.45 ratio         PTT - ( 16 Dec 2019 02:28 )  PTT:56.9 sec  Urinalysis Basic - ( 15 Dec 2019 00:17 )    Color: Orange / Appearance: Turbid / S.023 / pH: x  Gluc: x / Ketone: Trace  / Bili: Negative / Urobili: Negative   Blood: x / Protein: 300 mg/dL / Nitrite: Negative   Leuk Esterase: Large / RBC: 49 /hpf /  /HPF   Sq Epi: x / Non Sq Epi: 14 /hpf / Bacteria: Negative        RADIOLOGY & ADDITIONAL TESTS: Reviewed.

## 2019-12-17 LAB
-  AMIKACIN: SIGNIFICANT CHANGE UP
-  AMPICILLIN/SULBACTAM: SIGNIFICANT CHANGE UP
-  AMPICILLIN: SIGNIFICANT CHANGE UP
-  AZTREONAM: SIGNIFICANT CHANGE UP
-  CEFAZOLIN: SIGNIFICANT CHANGE UP
-  CEFEPIME: SIGNIFICANT CHANGE UP
-  CEFOXITIN: SIGNIFICANT CHANGE UP
-  CEFTRIAXONE: SIGNIFICANT CHANGE UP
-  CIPROFLOXACIN: SIGNIFICANT CHANGE UP
-  GENTAMICIN: SIGNIFICANT CHANGE UP
-  IMIPENEM: SIGNIFICANT CHANGE UP
-  LEVOFLOXACIN: SIGNIFICANT CHANGE UP
-  MEROPENEM: SIGNIFICANT CHANGE UP
-  NITROFURANTOIN: SIGNIFICANT CHANGE UP
-  PIPERACILLIN/TAZOBACTAM: SIGNIFICANT CHANGE UP
-  TIGECYCLINE: SIGNIFICANT CHANGE UP
-  TOBRAMYCIN: SIGNIFICANT CHANGE UP
-  TRIMETHOPRIM/SULFAMETHOXAZOLE: SIGNIFICANT CHANGE UP
ALBUMIN SERPL ELPH-MCNC: 2.5 G/DL — LOW (ref 3.3–5)
ALP SERPL-CCNC: 102 U/L — SIGNIFICANT CHANGE UP (ref 40–120)
ALT FLD-CCNC: 1387 U/L — HIGH (ref 10–45)
ANION GAP SERPL CALC-SCNC: 20 MMOL/L — HIGH (ref 5–17)
APTT BLD: 58 SEC — HIGH (ref 27.5–36.3)
AST SERPL-CCNC: 554 U/L — HIGH (ref 10–40)
BASOPHILS # BLD AUTO: 0.06 K/UL — SIGNIFICANT CHANGE UP (ref 0–0.2)
BASOPHILS NFR BLD AUTO: 0.3 % — SIGNIFICANT CHANGE UP (ref 0–2)
BILIRUB SERPL-MCNC: 0.7 MG/DL — SIGNIFICANT CHANGE UP (ref 0.2–1.2)
BUN SERPL-MCNC: 76 MG/DL — HIGH (ref 7–23)
CALCIUM SERPL-MCNC: 8.2 MG/DL — LOW (ref 8.4–10.5)
CHLORIDE SERPL-SCNC: 95 MMOL/L — LOW (ref 96–108)
CO2 SERPL-SCNC: 15 MMOL/L — LOW (ref 22–31)
CREAT SERPL-MCNC: 4.94 MG/DL — HIGH (ref 0.5–1.3)
CULTURE RESULTS: SIGNIFICANT CHANGE UP
EOSINOPHIL # BLD AUTO: 0.07 K/UL — SIGNIFICANT CHANGE UP (ref 0–0.5)
EOSINOPHIL NFR BLD AUTO: 0.3 % — SIGNIFICANT CHANGE UP (ref 0–6)
GLUCOSE BLDC GLUCOMTR-MCNC: 171 MG/DL — HIGH (ref 70–99)
GLUCOSE BLDC GLUCOMTR-MCNC: 180 MG/DL — HIGH (ref 70–99)
GLUCOSE SERPL-MCNC: 185 MG/DL — HIGH (ref 70–99)
HCT VFR BLD CALC: 35.7 % — SIGNIFICANT CHANGE UP (ref 34.5–45)
HGB BLD-MCNC: 12.2 G/DL — SIGNIFICANT CHANGE UP (ref 11.5–15.5)
IMM GRANULOCYTES NFR BLD AUTO: 0.8 % — SIGNIFICANT CHANGE UP (ref 0–1.5)
INR BLD: 1.31 RATIO — HIGH (ref 0.88–1.16)
LYMPHOCYTES # BLD AUTO: 2.09 K/UL — SIGNIFICANT CHANGE UP (ref 1–3.3)
LYMPHOCYTES # BLD AUTO: 9 % — LOW (ref 13–44)
MAGNESIUM SERPL-MCNC: 2.1 MG/DL — SIGNIFICANT CHANGE UP (ref 1.6–2.6)
MCHC RBC-ENTMCNC: 30.3 PG — SIGNIFICANT CHANGE UP (ref 27–34)
MCHC RBC-ENTMCNC: 34.2 GM/DL — SIGNIFICANT CHANGE UP (ref 32–36)
MCV RBC AUTO: 88.8 FL — SIGNIFICANT CHANGE UP (ref 80–100)
METHOD TYPE: SIGNIFICANT CHANGE UP
MONOCYTES # BLD AUTO: 0.94 K/UL — HIGH (ref 0–0.9)
MONOCYTES NFR BLD AUTO: 4 % — SIGNIFICANT CHANGE UP (ref 2–14)
NEUTROPHILS # BLD AUTO: 19.87 K/UL — HIGH (ref 1.8–7.4)
NEUTROPHILS NFR BLD AUTO: 85.6 % — HIGH (ref 43–77)
NRBC # BLD: 0 /100 WBCS — SIGNIFICANT CHANGE UP (ref 0–0)
ORGANISM # SPEC MICROSCOPIC CNT: SIGNIFICANT CHANGE UP
ORGANISM # SPEC MICROSCOPIC CNT: SIGNIFICANT CHANGE UP
PHOSPHATE SERPL-MCNC: 5 MG/DL — HIGH (ref 2.5–4.5)
PLATELET # BLD AUTO: 127 K/UL — LOW (ref 150–400)
POTASSIUM SERPL-MCNC: 5.3 MMOL/L — SIGNIFICANT CHANGE UP (ref 3.5–5.3)
POTASSIUM SERPL-SCNC: 5.3 MMOL/L — SIGNIFICANT CHANGE UP (ref 3.5–5.3)
PROT SERPL-MCNC: 6.1 G/DL — SIGNIFICANT CHANGE UP (ref 6–8.3)
PROTHROM AB SERPL-ACNC: 15.2 SEC — HIGH (ref 10–12.9)
RBC # BLD: 4.02 M/UL — SIGNIFICANT CHANGE UP (ref 3.8–5.2)
RBC # FLD: 13.5 % — SIGNIFICANT CHANGE UP (ref 10.3–14.5)
SODIUM SERPL-SCNC: 130 MMOL/L — LOW (ref 135–145)
SPECIMEN SOURCE: SIGNIFICANT CHANGE UP
WBC # BLD: 23.21 K/UL — HIGH (ref 3.8–10.5)
WBC # FLD AUTO: 23.21 K/UL — HIGH (ref 3.8–10.5)

## 2019-12-17 PROCEDURE — 99291 CRITICAL CARE FIRST HOUR: CPT

## 2019-12-17 PROCEDURE — 99233 SBSQ HOSP IP/OBS HIGH 50: CPT

## 2019-12-17 RX ORDER — NOREPINEPHRINE BITARTRATE/D5W 8 MG/250ML
0.1 PLASTIC BAG, INJECTION (ML) INTRAVENOUS
Qty: 8 | Refills: 0 | Status: DISCONTINUED | OUTPATIENT
Start: 2019-12-17 | End: 2019-12-19

## 2019-12-17 RX ORDER — HYDROMORPHONE HYDROCHLORIDE 2 MG/ML
0.2 INJECTION INTRAMUSCULAR; INTRAVENOUS; SUBCUTANEOUS
Refills: 0 | Status: DISCONTINUED | OUTPATIENT
Start: 2019-12-17 | End: 2019-12-19

## 2019-12-17 RX ORDER — HYDROMORPHONE HYDROCHLORIDE 2 MG/ML
0.5 INJECTION INTRAMUSCULAR; INTRAVENOUS; SUBCUTANEOUS EVERY 6 HOURS
Refills: 0 | Status: DISCONTINUED | OUTPATIENT
Start: 2019-12-17 | End: 2019-12-17

## 2019-12-17 RX ORDER — CALCIUM GLUCONATE 100 MG/ML
2 VIAL (ML) INTRAVENOUS ONCE
Refills: 0 | Status: COMPLETED | OUTPATIENT
Start: 2019-12-17 | End: 2019-12-17

## 2019-12-17 RX ADMIN — HYDROMORPHONE HYDROCHLORIDE 0.5 MILLIGRAM(S): 2 INJECTION INTRAMUSCULAR; INTRAVENOUS; SUBCUTANEOUS at 13:23

## 2019-12-17 RX ADMIN — PIPERACILLIN AND TAZOBACTAM 25 GRAM(S): 4; .5 INJECTION, POWDER, LYOPHILIZED, FOR SOLUTION INTRAVENOUS at 18:47

## 2019-12-17 RX ADMIN — Medication 200 GRAM(S): at 00:32

## 2019-12-17 RX ADMIN — HEPARIN SODIUM 1100 UNIT(S)/HR: 5000 INJECTION INTRAVENOUS; SUBCUTANEOUS at 00:53

## 2019-12-17 RX ADMIN — Medication 2: at 00:25

## 2019-12-17 RX ADMIN — Medication 1 DROP(S): at 18:46

## 2019-12-17 RX ADMIN — PIPERACILLIN AND TAZOBACTAM 25 GRAM(S): 4; .5 INJECTION, POWDER, LYOPHILIZED, FOR SOLUTION INTRAVENOUS at 04:55

## 2019-12-17 RX ADMIN — Medication 81 MILLIGRAM(S): at 11:59

## 2019-12-17 RX ADMIN — Medication 2: at 05:11

## 2019-12-17 RX ADMIN — Medication 1 DROP(S): at 11:58

## 2019-12-17 RX ADMIN — CHLORHEXIDINE GLUCONATE 1 APPLICATION(S): 213 SOLUTION TOPICAL at 04:57

## 2019-12-17 RX ADMIN — Medication 14.18 MICROGRAM(S)/KG/MIN: at 04:38

## 2019-12-17 RX ADMIN — CHLORHEXIDINE GLUCONATE 15 MILLILITER(S): 213 SOLUTION TOPICAL at 04:56

## 2019-12-17 RX ADMIN — CLOPIDOGREL BISULFATE 75 MILLIGRAM(S): 75 TABLET, FILM COATED ORAL at 11:59

## 2019-12-17 RX ADMIN — HYDROMORPHONE HYDROCHLORIDE 0.5 MILLIGRAM(S): 2 INJECTION INTRAMUSCULAR; INTRAVENOUS; SUBCUTANEOUS at 13:50

## 2019-12-17 RX ADMIN — Medication 1 DROP(S): at 23:27

## 2019-12-17 RX ADMIN — Medication 2: at 11:58

## 2019-12-17 RX ADMIN — Medication 1 DROP(S): at 00:25

## 2019-12-17 RX ADMIN — CHLORHEXIDINE GLUCONATE 15 MILLILITER(S): 213 SOLUTION TOPICAL at 18:46

## 2019-12-17 RX ADMIN — Medication 1 DROP(S): at 04:57

## 2019-12-17 NOTE — CHART NOTE - NSCHARTNOTEFT_GEN_A_CORE
MICU Transfer Note    Transfer from: MICU    Transfer to: (  ) Medicine    (  ) Telemetry     (   ) RCU        (  x ) Palliative         (   ) Stroke Unit          (   ) __________________    Accepting Physician: Dr. Moya  Signout given to:     MICU COURSE:          ASSESSMENT & PLAN:             FOR FOLLOW UP: MICU Transfer Note    Transfer from: MICU    Transfer to: (  ) Medicine    (  ) Telemetry     (   ) RCU        (  x ) Palliative care unit        (   ) Stroke Unit          (   ) __________________    Accepting Physician/Signout given to: Dr. Sanabria    MICU COURSE:  89 year old woman with dementia, wheelchair bound, chronic UTI, Afib, BiV pacemaker, HLD, gallstone pancreatitis off all home medications presented with AMS admitted for septic shock 2/2 UTI and NSTEMI/cardiogenic shock. Patient on treatment for UTI with zosyn, medical mgmt with asa/plavix for NSTEMI (is s/p 48 hrs of hep gtt as of 12/17 AM), on minimal dose of levophed for septic/cardiogenic shock. Remains intubated at present. Patient now DNR, plan for transfer to PCU where family (patient's sons) will revisit elective compassionate extubation. No further escalation of care, no further blood draws, no tube feeds. Focus is comfort care.        ASSESSMENT & PLAN:     transfer to pcu when bed available  recommend dilaudid 0.2mg q1h prn for pain, q1h prn for dyspnea  ativan 0.2mg q1h prn anxiety/agitation.         FOR FOLLOW UP: MICU Transfer Note    Transfer from: MICU    Transfer to: (  ) Medicine    (  ) Telemetry     (   ) RCU        (  x ) Palliative care unit        (   ) Stroke Unit          (   ) __________________    Accepting Physician/Signout given to: Dr. Sanabria    MICU COURSE:  89 year old woman with dementia, wheelchair bound, chronic UTI, Afib, BiV pacemaker, HLD, gallstone pancreatitis off all home medications presented with AMS admitted for septic shock 2/2 UTI and NSTEMI/cardiogenic shock. Patient on treatment for UTI with zosyn, medical mgmt with asa/plavix for NSTEMI (is s/p 48 hrs of hep gtt as of 12/17 AM), on minimal dose of levophed for septic/cardiogenic shock. Remains intubated at present. Patient now DNR, plan for transfer to PCU where family (patient's sons) will revisit elective compassionate extubation. No further escalation of care, no further blood draws, no tube feeds. Focus is comfort care.        ASSESSMENT & PLAN:   #Neuro:  - Prop dc'ed 7pm 12/15 and continues to remain largely unresponsive (grimaces and withdraws to painful stimuli but otherwise remains unresponsive)  - Dementia, wheelchair bound, minimally verbal at baseline (says yes and no at baseline), requires 24 hour aide    # CV:  1. NSTEMI  - Patient found to have ST elevations on EKG not meeting Sgarbossa's criteria with elevated troponin of >10,000  - Cardiology consulted; patient not a candidate for cath given advanced age and poor clinical status  - Medical mgmt with asa, plavix, and heparin drip; dc hep gtt in AM on 12/17    2. Septic/cardiogenic shock   - Likely in the setting of UTI vs stercoral colitis vs aspiration pneumonia and NSTEMI  - no further escalation in levo  - c/w Zosyn for UTI vs aspiration pneumonia coverage    3. Afib  - Off anticoagulation at home  - Remains V paced with BiV pacemaker    # Pulm:  1. Intubated for airway protection  - plan for extubation in PCU    2. Possible aspiration PNA  - Patient coughing with feeding, hx of dementia  - c/w zosyn (12/15-12/21)    3. Bilateral pleural effusions  - Holding diuresis given DENISE and euvolemia    #GI:  1. Possible stercoral colitis, transmural wall thickening on CT  - Bowel regimen, s/p disimpaction  - Continue with Zosyn   - No tube feeds, will focus on comfort care    2. Transaminitis, likely shock liver  - Downtrended  - No further blood draws, will focus on comfort care    #/Renal:  1. DENISE, likely both pre-renal and ATN in setting of septic shock  - No further blood draws, will focus on comfort care    2. Lactic acidosis, intubated and paralyzed  - Downtrended  - No further blood draws, will focus on comfort care    #Endo  - ISS    #Skin  S/p skin biopsy on Wednesday on RUE  - Routine wound dressing    #Heme/onc:  - Stable leukocytosis in the setting of septic shock  - No further blood draws, will focus on comfort care    #ID:  Septic shock  - Likely secondary to UTI vs aspiration pneumonia vs stercoral colitis  - c/w Zosyn (12/15-12/21)  - f/u UCx, GNR  - f/u BCx, NTD  - f/u sputum Cx, NTD    # DVT ppx  - Hep gtt dc'ed, no further escalation in care, will focus on comfort care    # GOC  - Patient is DNR, no further escalation in care, no tube feeds, will focus on comfort care  - transfer to pcu when bed available  - dilaudid 0.2mg q1h prn for pain, q1h prn for dyspnea, ativan 0.2mg q1h prn anxiety/agitation.         FOR FOLLOW UP: MICU Transfer Note    Transfer from: MICU    Transfer to: (  ) Medicine    (  ) Telemetry     (   ) RCU        (  x ) Palliative care unit        (   ) Stroke Unit          (   ) __________________    Accepting Physician/Signout given to: Dr. Sanabria    MICU COURSE:  89 year old woman with dementia, wheelchair bound, chronic UTI, Afib, BiV pacemaker, HLD, gallstone pancreatitis off all home medications presented with AMS admitted for septic shock 2/2 UTI and NSTEMI/cardiogenic shock. Patient on treatment for UTI with zosyn, medical mgmt with asa/plavix for NSTEMI (is s/p 48 hrs of hep gtt as of 12/17 AM), on minimal dose of levophed for septic/cardiogenic shock. Remains intubated at present. Patient now DNR, plan for transfer to PCU where family (patient's sons) will revisit elective compassionate extubation. No further escalation of care, no further blood draws, no tube feeds. Focus is comfort care.        ASSESSMENT & PLAN:   #Neuro:  - Prop dc'ed 7pm 12/15 and continues to remain largely unresponsive (grimaces and withdraws to painful stimuli but otherwise remains unresponsive)  - Dementia, wheelchair bound, minimally verbal at baseline (says yes and no at baseline), requires 24 hour aide    # CV:  1. NSTEMI  - Patient found to have ST elevations on EKG not meeting Sgarbossa's criteria with elevated troponin of >10,000  - Cardiology consulted; patient not a candidate for cath given advanced age and poor clinical status  - Medical mgmt with asa, plavix, and heparin drip; dc hep gtt in AM on 12/17    2. Septic/cardiogenic shock   - Likely in the setting of UTI vs stercoral colitis vs aspiration pneumonia and NSTEMI  - no further escalation in levo  - c/w Zosyn for UTI vs aspiration pneumonia coverage    3. Afib  - Off anticoagulation at home  - Remains V paced with BiV pacemaker    # Pulm:  1. Intubated for airway protection  - plan for extubation in PCU    2. Possible aspiration PNA  - Patient coughing with feeding, hx of dementia  - c/w zosyn (12/15-12/21)    3. Bilateral pleural effusions  - Holding diuresis given DENISE and euvolemia    #GI:  1. Possible stercoral colitis, transmural wall thickening on CT  - Bowel regimen, s/p disimpaction  - Continue with Zosyn   - No tube feeds, will focus on comfort care    2. Transaminitis, likely shock liver  - Downtrended  - No further blood draws, will focus on comfort care    #/Renal:  1. DENISE, likely both pre-renal and ATN in setting of septic shock  - No further blood draws, will focus on comfort care    2. Lactic acidosis, intubated and paralyzed  - Downtrended  - No further blood draws, will focus on comfort care    #Endo  - dc ISS, will focus on comfort care    #Skin  S/p skin biopsy on Wednesday on RUE  - Routine wound dressing    #Heme/onc:  - Stable leukocytosis in the setting of septic shock  - No further blood draws, will focus on comfort care    #ID:  Septic shock  - Likely secondary to UTI vs aspiration pneumonia vs stercoral colitis  - c/w Zosyn (12/15-12/21)  - f/u UCx, GNR  - f/u BCx, NTD  - f/u sputum Cx, NTD    # DVT ppx  - Hep gtt dc'ed, no further escalation in care, will focus on comfort care    # GOC  - Patient is DNR, no further escalation in care, no tube feeds, will focus on comfort care  - transfer to pcu when bed available  - dilaudid 0.2mg q1h prn for pain, q1h prn for dyspnea, ativan 0.2mg q1h prn anxiety/agitation.         FOR FOLLOW UP:

## 2019-12-17 NOTE — PROGRESS NOTE ADULT - ASSESSMENT
Spoke to pt, states she is at ER.   Ms. Seay is a 89 yo F with a PMH significant for chronic UTI, wheel chair bound, dementia, afib, BiV pacemaker, HLD, gallstone pancreatitis p/w AMS. She has had nasal congestion, rhionrrhea, and cough with and without eating. Admitted for septic shock secondary to ?UTI. Now intubated, sedated. Palliative care consulted for GOC

## 2019-12-17 NOTE — PROGRESS NOTE ADULT - ASSESSMENT
89 year old woman with dementia, wheelchair bound, chronic UTI, Afib, BiV pacemaker, HLD, gallstone pancreatitis off all home medications presented with AMS admitted for septic shock 2/2 UTI and NSTEMI/cardiogenic shock.    #Neuro:  - Prop dc'ed 7pm 12/15  - Dementia, wheelchair bound, minimally verbal at baseline (says yes and no at baseline), requires 24 hour aide,  - Monitor mental status    # CV:  1. NSTEMI  - Patient found to have ST elevations on EKG not meeting Sgarbossa's criteria with elevated troponin of >10,000  - Cardiology consulted; patient not a candidate for cath given advanced age and poor clinical status  - Medical mgmt with asa, plavix, and heparin drip; dc hep gtt in AM no 12/17    2. Septic/cardiogenic shock   - Likely in the setting of UTI vs stercoral colitis vs aspiration pneumonia and NSTEMI  - c/w levo, titrate to maintain MAP >65  - c/w Zosyn for UTI vs aspiration pneumonia coverage    3. Afib  - Off anticoagulation at home  - Remains V paced with BiV pacemaker  - Continue with heparin drip for NSTEMI    # Pulm:  1. Intubated for airway protection  - wean vent support as tolerated  - monitor ABG    2. Possible aspiration PNA  - Patient coughing with feeding, hx of dementia  - c/w zosyn (12/15-12/21)    3. Bilateral pleural effusions  - Holding diuresis given DENISE and euvolemia  - Continue to monitor fluid status    #GI:  1. Possible stercoral colitis, transmural wall thickening on CT  - Bowel regimen, s/p disimpaction  - Continue with Zosyn   - Currently NPO, will need to clarify GOC    2. Transaminitis, likely shock liver  - Now downtrending  - Maintain MAP 65  - Trend CMP    #/Renal:  1. DENISE, likely both pre-renal and ATN in setting of septic shock  - Cr 3.9 from 2.2 on admission  - Maintain MAP > 65  - DC [place]ston today in setting of UTI    2. Lactic acidosis, intubated and paralyzed  - Downtrending  - Continue to maintain perfusion with pressors    # Endo/Rheum:  Hyperglycemia  - Continue with ISS    #Skin  S/p skin biopsy on Wednesday on RUE  - Routine wound dressing    #Heme/onc:  - Stable leukocytosis in the setting of septic shock    #ID:  Septic shock  - Likely secondary to UTI vs aspiration pneumonia vs stercoral colitis  - c/w Zosyn (12/15-)  - f/u UCx, GNR  - f/u BCx, NTD  - f/u sputum Cx in lab    # DVT ppx  - heparin drip     # Code status  - Patient is DNR  - Palliative consult placed 89 year old woman with dementia, wheelchair bound, chronic UTI, Afib, BiV pacemaker, HLD, gallstone pancreatitis off all home medications presented with AMS admitted for septic shock 2/2 UTI and NSTEMI/cardiogenic shock.    #Neuro:  - Prop dc'ed 7pm 12/15 and continues to remain largely unresponsive (grimaces and withdraws to painful stimuli but otherwise remains unresponsive)  - Dementia, wheelchair bound, minimally verbal at baseline (says yes and no at baseline), requires 24 hour aide    # CV:  1. NSTEMI  - Patient found to have ST elevations on EKG not meeting Sgarbossa's criteria with elevated troponin of >10,000  - Cardiology consulted; patient not a candidate for cath given advanced age and poor clinical status  - Medical mgmt with asa, plavix, and heparin drip; dc hep gtt in AM on 12/17    2. Septic/cardiogenic shock   - Likely in the setting of UTI vs stercoral colitis vs aspiration pneumonia and NSTEMI  - no further escalation in levo  - c/w Zosyn for UTI vs aspiration pneumonia coverage    3. Afib  - Off anticoagulation at home  - Remains V paced with BiV pacemaker    # Pulm:  1. Intubated for airway protection  - plan for extubation in PCU    2. Possible aspiration PNA  - Patient coughing with feeding, hx of dementia  - c/w zosyn (12/15-12/21)    3. Bilateral pleural effusions  - Holding diuresis given DENISE and euvolemia    #GI:  1. Possible stercoral colitis, transmural wall thickening on CT  - Bowel regimen, s/p disimpaction  - Continue with Zosyn   - No tube feeds, will focus on comfort care    2. Transaminitis, likely shock liver  - Downtrended  - No further blood draws, will focus on comfort care    #/Renal:  1. DENISE, likely both pre-renal and ATN in setting of septic shock  - No further blood draws, will focus on comfort care    2. Lactic acidosis, intubated and paralyzed  - Downtrended  - No further blood draws, will focus on comfort care    #Endo  - dc ISS, will focus on comfort care    #Skin  S/p skin biopsy on Wednesday on RUE  - Routine wound dressing    #Heme/onc:  - Stable leukocytosis in the setting of septic shock  - No further blood draws, will focus on comfort care    #ID:  Septic shock  - Likely secondary to UTI vs aspiration pneumonia vs stercoral colitis  - c/w Zosyn (12/15-12/21)  - f/u UCx, GNR  - f/u BCx, NTD  - f/u sputum Cx, NTD    # DVT ppx  - Hep gtt dc'ed, no further escalation in care, will focus on comfort care    # GOC  - Patient is DNR, no further escalation in care, no tube feeds, will focus on comfort care  - transfer to pcu when bed available  - dilaudid 0.2mg q1h prn for pain, q1h prn for dyspnea, ativan 0.2mg q1h prn anxiety/agitation.

## 2019-12-17 NOTE — PROGRESS NOTE ADULT - SUBJECTIVE AND OBJECTIVE BOX
Navi Martin, PGY2   Pager [phone]    INTERVAL HPI/OVERNIGHT EVENTS:    SUBJECTIVE: Patient seen and examined at bedside.     CONSTITUTIONAL: No fevers, chills, weakness  HEENT: No headache, acute visual changes, throat pain  NECK: No pain or stiffness  RESPIRATORY: No sob, cough, wheezing, hemoptysis  CARDIOVASCULAR: No chest pain or palpitations  GASTROINTESTINAL: No abdominal pain, nausea, vomiting, constipation, or diarrhea  GENITOURINARY: No dysuria, frequency or hematuria  NEUROLOGICAL: No numbness or weakness  SKIN: No rash or itching    OBJECTIVE:    VITAL SIGNS:  ICU Vital Signs Last 24 Hrs  T(C): 37.2 (17 Dec 2019 04:00), Max: 38 (17 Dec 2019 00:00)  T(F): 98.9 (17 Dec 2019 04:00), Max: 100.4 (17 Dec 2019 00:00)  HR: 81 (17 Dec 2019 05:45) (80 - 93)  BP: 91/51 (17 Dec 2019 05:45) (84/50 - 105/61)  BP(mean): 65 (17 Dec 2019 05:45) (62 - 78)  ABP: --  ABP(mean): --  RR: 29 (17 Dec 2019 05:45) (19 - 34)  SpO2: 100% (17 Dec 2019 05:45) (94% - 100%)    Mode: AC/ CMV (Assist Control/ Continuous Mandatory Ventilation), RR (machine): 18, TV (machine): 350, FiO2: 35, PEEP: 5, ITime: 1, MAP: 6, PIP: 16    12-15 @ 07:01 - 12-16 @ 07:00  --------------------------------------------------------  IN: 1687.6 mL / OUT: 85 mL / NET: 1602.6 mL    12-16 @ 07:01  -  12-17 @ 06:37  --------------------------------------------------------  IN: 525.1 mL / OUT: 20 mL / NET: 505.1 mL      CAPILLARY BLOOD GLUCOSE  166 (16 Dec 2019 18:00)      POCT Blood Glucose.: 171 mg/dL (17 Dec 2019 05:09)      PHYSICAL EXAM:  General: NAD  HEENT: NCAT, PERRL, clear conjunctiva, mmm  Neck: supple, no JVD  Respiratory: CTAB  Cardiovascular: RRR, S1S2, no m/r/g  Abdomen: soft, nontender, nondistended, normal bowel sounds  Extremities: no edema or cyanosis  Skin: warm, perfused, normal turgor  Neurological: nonfocal    MEDICATIONS:  MEDICATIONS  (STANDING):  artificial  tears Solution 1 Drop(s) Both EYES every 6 hours  aspirin  chewable 81 milliGRAM(s) Oral daily  chlorhexidine 0.12% Liquid 15 milliLiter(s) Oral Mucosa every 12 hours  chlorhexidine 4% Liquid 1 Application(s) Topical <User Schedule>  clopidogrel Tablet 75 milliGRAM(s) Oral daily  dextrose 5%. 1000 milliLiter(s) (50 mL/Hr) IV Continuous <Continuous>  dextrose 50% Injectable 12.5 Gram(s) IV Push once  dextrose 50% Injectable 25 Gram(s) IV Push once  dextrose 50% Injectable 25 Gram(s) IV Push once  insulin lispro (HumaLOG) corrective regimen sliding scale   SubCutaneous every 6 hours  norepinephrine Infusion 0.1 MICROgram(s)/kG/Min (14.175 mL/Hr) IV Continuous <Continuous>  piperacillin/tazobactam IVPB.. 3.375 Gram(s) IV Intermittent every 12 hours  propofol Infusion 10 MICROgram(s)/kG/Min (4.536 mL/Hr) IV Continuous <Continuous>    MEDICATIONS  (PRN):  dextrose 40% Gel 15 Gram(s) Oral once PRN Blood Glucose LESS THAN 70 milliGRAM(s)/deciliter  glucagon  Injectable 1 milliGRAM(s) IntraMuscular once PRN Glucose LESS THAN 70 milligrams/deciliter      ALLERGIES:  Allergies    No Known Allergies    Intolerances        LABS:                        12.2   23.21 )-----------( 127      ( 17 Dec 2019 00:15 )             35.7     12-17    130<L>  |  95<L>  |  76<H>  ----------------------------<  185<H>  5.3   |  15<L>  |  4.94<H>    Ca    8.2<L>      17 Dec 2019 00:15  Phos  5.0     12-17  Mg     2.1     12-17    TPro  6.1  /  Alb  2.5<L>  /  TBili  0.7  /  DBili  x   /  AST  554<H>  /  ALT  1387<H>  /  AlkPhos  102  12-17    PT/INR - ( 17 Dec 2019 00:15 )   PT: 15.2 sec;   INR: 1.31 ratio         PTT - ( 17 Dec 2019 00:15 )  PTT:58.0 sec      RADIOLOGY & ADDITIONAL TESTS: Reviewed. Navi Martin, PGY2   Pager [phone]    INTERVAL HPI/OVERNIGHT EVENTS: GIOVANNY. Hep gtt dc'ed.    SUBJECTIVE: Patient seen and examined at bedside. Remains largely unresponsive.    ROS unable to be obtained as patient unresponsive.    OBJECTIVE:    VITAL SIGNS:  ICU Vital Signs Last 24 Hrs  T(C): 37.2 (17 Dec 2019 04:00), Max: 38 (17 Dec 2019 00:00)  T(F): 98.9 (17 Dec 2019 04:00), Max: 100.4 (17 Dec 2019 00:00)  HR: 81 (17 Dec 2019 05:45) (80 - 93)  BP: 91/51 (17 Dec 2019 05:45) (84/50 - 105/61)  BP(mean): 65 (17 Dec 2019 05:45) (62 - 78)  ABP: --  ABP(mean): --  RR: 29 (17 Dec 2019 05:45) (19 - 34)  SpO2: 100% (17 Dec 2019 05:45) (94% - 100%)    Mode: AC/ CMV (Assist Control/ Continuous Mandatory Ventilation), RR (machine): 18, TV (machine): 350, FiO2: 35, PEEP: 5, ITime: 1, MAP: 6, PIP: 16    12-15 @ 07:01  -  12-16 @ 07:00  --------------------------------------------------------  IN: 1687.6 mL / OUT: 85 mL / NET: 1602.6 mL    12-16 @ 07:01  -  12-17 @ 06:37  --------------------------------------------------------  IN: 525.1 mL / OUT: 20 mL / NET: 505.1 mL      CAPILLARY BLOOD GLUCOSE  166 (16 Dec 2019 18:00)      POCT Blood Glucose.: 171 mg/dL (17 Dec 2019 05:09)    PHYSICAL EXAM:  General: NAD  HEENT: NCAT, PERRL, clear conjunctiva, mmm  Neck: supple, no JVD  Respiratory: CTAB  Cardiovascular: RRR, S1S2, systolic murmur  Abdomen: soft, nontender, nondistended, normal bowel sounds  Extremities: mild dependent edema, no cyanosis  Skin: warm, perfused   Neurological: grimaces and withdraws to painful stimuli, nonfocal    MEDICATIONS:  MEDICATIONS  (STANDING):  artificial  tears Solution 1 Drop(s) Both EYES every 6 hours  aspirin  chewable 81 milliGRAM(s) Oral daily  chlorhexidine 0.12% Liquid 15 milliLiter(s) Oral Mucosa every 12 hours  chlorhexidine 4% Liquid 1 Application(s) Topical <User Schedule>  clopidogrel Tablet 75 milliGRAM(s) Oral daily  dextrose 5%. 1000 milliLiter(s) (50 mL/Hr) IV Continuous <Continuous>  dextrose 50% Injectable 12.5 Gram(s) IV Push once  dextrose 50% Injectable 25 Gram(s) IV Push once  dextrose 50% Injectable 25 Gram(s) IV Push once  insulin lispro (HumaLOG) corrective regimen sliding scale   SubCutaneous every 6 hours  norepinephrine Infusion 0.1 MICROgram(s)/kG/Min (14.175 mL/Hr) IV Continuous <Continuous>  piperacillin/tazobactam IVPB.. 3.375 Gram(s) IV Intermittent every 12 hours  propofol Infusion 10 MICROgram(s)/kG/Min (4.536 mL/Hr) IV Continuous <Continuous>    MEDICATIONS  (PRN):  dextrose 40% Gel 15 Gram(s) Oral once PRN Blood Glucose LESS THAN 70 milliGRAM(s)/deciliter  glucagon  Injectable 1 milliGRAM(s) IntraMuscular once PRN Glucose LESS THAN 70 milligrams/deciliter      ALLERGIES:  Allergies    No Known Allergies    Intolerances        LABS:                        12.2   23.21 )-----------( 127      ( 17 Dec 2019 00:15 )             35.7     12-17    130<L>  |  95<L>  |  76<H>  ----------------------------<  185<H>  5.3   |  15<L>  |  4.94<H>    Ca    8.2<L>      17 Dec 2019 00:15  Phos  5.0     12-17  Mg     2.1     12-17    TPro  6.1  /  Alb  2.5<L>  /  TBili  0.7  /  DBili  x   /  AST  554<H>  /  ALT  1387<H>  /  AlkPhos  102  12-17    PT/INR - ( 17 Dec 2019 00:15 )   PT: 15.2 sec;   INR: 1.31 ratio         PTT - ( 17 Dec 2019 00:15 )  PTT:58.0 sec      RADIOLOGY & ADDITIONAL TESTS: Reviewed.

## 2019-12-17 NOTE — PROGRESS NOTE ADULT - SUBJECTIVE AND OBJECTIVE BOX
SUBJECTIVE AND OBJECTIVE:  INTERVAL HPI/OVERNIGHT EVENTS:    DNR on chart: Yes    Allergies    No Known Allergies    Intolerances    MEDICATIONS  (STANDING):  artificial  tears Solution 1 Drop(s) Both EYES every 6 hours  aspirin  chewable 81 milliGRAM(s) Oral daily  chlorhexidine 0.12% Liquid 15 milliLiter(s) Oral Mucosa every 12 hours  chlorhexidine 4% Liquid 1 Application(s) Topical <User Schedule>  clopidogrel Tablet 75 milliGRAM(s) Oral daily  dextrose 5%. 1000 milliLiter(s) (50 mL/Hr) IV Continuous <Continuous>  dextrose 50% Injectable 12.5 Gram(s) IV Push once  dextrose 50% Injectable 25 Gram(s) IV Push once  dextrose 50% Injectable 25 Gram(s) IV Push once  insulin lispro (HumaLOG) corrective regimen sliding scale   SubCutaneous every 6 hours  norepinephrine Infusion 0.1 MICROgram(s)/kG/Min (14.175 mL/Hr) IV Continuous <Continuous>  piperacillin/tazobactam IVPB.. 3.375 Gram(s) IV Intermittent every 12 hours    MEDICATIONS  (PRN):  dextrose 40% Gel 15 Gram(s) Oral once PRN Blood Glucose LESS THAN 70 milliGRAM(s)/deciliter  glucagon  Injectable 1 milliGRAM(s) IntraMuscular once PRN Glucose LESS THAN 70 milligrams/deciliter  HYDROmorphone  Injectable 0.5 milliGRAM(s) IV Push every 6 hours PRN pain      ITEMS UNCHECKED ARE NOT PRESENT    PRESENT SYMPTOMS: [ ]Unable to obtain due to poor mentation   Source if other than patient:  [ ]Family   [ ]Team     Pain:  [ ]yes [ ]no  QOL impact -   Location -                    Aggravating factors -  Quality -  Radiation -  Timing-  Severity (0-10 scale):  Minimal acceptable level (0-10 scale):     Dyspnea:                           [ ]Mild [ ]Moderate [ ]Severe  Anxiety:                             [ ]Mild [ ]Moderate [ ]Severe  Fatigue:                             [ ]Mild [ ]Moderate [ ]Severe  Nausea:                             [ ]Mild [ ]Moderate [ ]Severe  Loss of appetite:              [ ]Mild [ ]Moderate [ ]Severe  Constipation:                    [ ]Mild [ ]Moderate [ ]Severe    PAIN AD Score:	  [email or web address] (Ctrl + left click to view)    Other Symptoms:  [ ]All other review of systems negative     Palliative Performance Status Version 2:         %      http://npcrc.org/files/news/palliative_performance_scale_[email or web address].pdf  PHYSICAL EXAM:  Vital Signs Last 24 Hrs  T(C): 37.2 (17 Dec 2019 12:00), Max: 38 (17 Dec 2019 00:00)  T(F): 99 (17 Dec 2019 12:00), Max: 100.4 (17 Dec 2019 00:00)  HR: 91 (17 Dec 2019 12:28) (76 - 93)  BP: 102/64 (17 Dec 2019 12:00) (79/52 - 106/70)  BP(mean): 77 (17 Dec 2019 12:00) (56 - 82)  RR: 24 (17 Dec 2019 12:15) (18 - 34)  SpO2: 100% (17 Dec 2019 12:28) (94% - 100%) I&O's Summary    16 Dec 2019 07:01  -  17 Dec 2019 07:00  --------------------------------------------------------  IN: 553.6 mL / OUT: 20 mL / NET: 533.6 mL    17 Dec 2019 07:01  -  17 Dec 2019 12:48  --------------------------------------------------------  IN: 44.7 mL / OUT: 0 mL / NET: 44.7 mL       GENERAL:  [ ]Alert  [ ]Oriented x   [ ]Lethargic  [ ]Cachexia  [ ]Unarousable  [ ]Verbal  [ ]Non-Verbal  Behavioral:   [ ]Anxiety  [ ]Delirium [ ]Agitation [ ]Other  HEENT:  [ ]Normal   [ ]Dry mouth   [ ]ET Tube/Trach  [ ]Oral lesions  PULMONARY:   [ ]Clear [ ]Tachypnea  [ ]Audible excessive secretions   [ ]Rhonchi        [ ]Right [ ]Left [ ]Bilateral  [ ]Crackles        [ ]Right [ ]Left [ ]Bilateral  [ ]Wheezing     [ ]Right [ ]Left [ ]Bilateral  [ ]Diminished BS [ ] Right [ ]Left [ ]Bilateral  CARDIOVASCULAR:    [ ]Regular [ ]Irregular [ ]Tachy  [ ]Manuelito [ ]Murmur [ ]Other  GASTROINTESTINAL:  [ ]Soft  [ ]Distended   [ ]+BS  [ ]Non tender [ ]Tender  [ ]PEG [ ]OGT/ NGT   Last BM:      GENITOURINARY:  [ ]Normal [ ]Incontinent   [ ]Oliguria/Anuria   [ ]Tony  MUSCULOSKELETAL:   [ ]Normal   [ ]Weakness  [ ]Bed/Wheelchair bound [ ]Edema  NEUROLOGIC:   [ ]No focal deficits  [ ] Cognitive impairment  [ ] Dysphagia [ ]Dysarthria [ ] Paresis [ ]Other   SKIN:   [ ]Normal  [ ]Rash   [ ]Pressure ulcer(s) [ ]y [ ]n present on admission    CRITICAL CARE:  [ ]Shock Present  [ ]Septic [ ]Cardiogenic [ ]Neurologic [ ]Hypovolemic  [ ]Vasopressors [ ]Inotropes  [ ]Respiratory failure present [ ]Mechanical Ventilation [ ]Non-invasive ventilatory support [ ]High-Flow  [ ]Acute  [ ]Chronic [ ]Hypoxic  [ ]Hypercarbic [ ]Other  [ ]Other organ failure     LABS:                        12.2   23.21 )-----------( 127      ( 17 Dec 2019 00:15 )             35.7   12-17    130<L>  |  95<L>  |  76<H>  ----------------------------<  185<H>  5.3   |  15<L>  |  4.94<H>    Ca    8.2<L>      17 Dec 2019 00:15  Phos  5.0     12-17  Mg     2.1     12-17    TPro  6.1  /  Alb  2.5<L>  /  TBili  0.7  /  DBili  x   /  AST  554<H>  /  ALT  1387<H>  /  AlkPhos  102  12-17  PT/INR - ( 17 Dec 2019 00:15 )   PT: 15.2 sec;   INR: 1.31 ratio         PTT - ( 17 Dec 2019 00:15 )  PTT:58.0 sec      RADIOLOGY & ADDITIONAL STUDIES:    Protein Calorie Malnutrition Present: [ ]mild [ ]moderate [ ]severe [ ]underweight [ ]morbid obesity  https://www.andeal.org/vault/2440/web/files/ONC/Table_Clinical%20Characteristics%20to%20Document%20Malnutrition-White%20JV%20et%20al%202012.pdf    Height (cm): 167.6 (12-14-19 @ 23:15)  Weight (kg): 75.6 (12-14-19 @ 23:15)  BMI (kg/m2): 26.9 (12-14-19 @ 23:15)    [ ]PPSV2 < or = 30%  [ ]significant weight loss [ ]poor nutritional intake [ ]anasarca   Albumin, Serum: 2.5 g/dL (12-17-19 @ 00:15)   [ ]Artificial Nutrition    REFERRALS:   [ ]Chaplaincy  [ ]Hospice  [ ]Child Life  [ ]Social Work  [ ]Case management [ ]Holistic Therapy     Goals of Care Document: SUBJECTIVE AND OBJECTIVE: Patient seen and examined, remains intubated and sedated.  remains on pressors. awaiting family meeting 3:30pm    INTERVAL HPI/OVERNIGHT EVENTS: no acute overnight events.     DNR on chart: Yes    Allergies    No Known Allergies    Intolerances    MEDICATIONS  (STANDING):  artificial  tears Solution 1 Drop(s) Both EYES every 6 hours  aspirin  chewable 81 milliGRAM(s) Oral daily  chlorhexidine 0.12% Liquid 15 milliLiter(s) Oral Mucosa every 12 hours  chlorhexidine 4% Liquid 1 Application(s) Topical <User Schedule>  clopidogrel Tablet 75 milliGRAM(s) Oral daily  dextrose 5%. 1000 milliLiter(s) (50 mL/Hr) IV Continuous <Continuous>  dextrose 50% Injectable 12.5 Gram(s) IV Push once  dextrose 50% Injectable 25 Gram(s) IV Push once  dextrose 50% Injectable 25 Gram(s) IV Push once  insulin lispro (HumaLOG) corrective regimen sliding scale   SubCutaneous every 6 hours  norepinephrine Infusion 0.1 MICROgram(s)/kG/Min (14.175 mL/Hr) IV Continuous <Continuous>  piperacillin/tazobactam IVPB.. 3.375 Gram(s) IV Intermittent every 12 hours    MEDICATIONS  (PRN):  dextrose 40% Gel 15 Gram(s) Oral once PRN Blood Glucose LESS THAN 70 milliGRAM(s)/deciliter  glucagon  Injectable 1 milliGRAM(s) IntraMuscular once PRN Glucose LESS THAN 70 milligrams/deciliter  HYDROmorphone  Injectable 0.5 milliGRAM(s) IV Push every 6 hours PRN pain      ITEMS UNCHECKED ARE NOT PRESENT    PRESENT SYMPTOMS: [x ]Unable to obtain due to poor mentation   Source if other than patient:  [ ]Family   [ ]Team     Pain:  [ ]yes [ ]no  QOL impact -   Location -                    Aggravating factors -  Quality -  Radiation -  Timing-  Severity (0-10 scale):  Minimal acceptable level (0-10 scale):     Dyspnea:                           [ ]Mild [ ]Moderate [ ]Severe  Anxiety:                             [ ]Mild [ ]Moderate [ ]Severe  Fatigue:                             [ ]Mild [ ]Moderate [ ]Severe  Nausea:                             [ ]Mild [ ]Moderate [ ]Severe  Loss of appetite:              [ ]Mild [ ]Moderate [ ]Severe  Constipation:                    [ ]Mild [ ]Moderate [ ]Severe    PAIN AD Score:	0  [email or web address] (Ctrl + left click to view)    Other Symptoms:  [ ]All other review of systems negative     Palliative Performance Status Version 2:       10%      http://npcrc.org/files/news/palliative_performance_scale_[email or web address].pdf  PHYSICAL EXAM:  Vital Signs Last 24 Hrs  T(C): 37.2 (17 Dec 2019 12:00), Max: 38 (17 Dec 2019 00:00)  T(F): 99 (17 Dec 2019 12:00), Max: 100.4 (17 Dec 2019 00:00)  HR: 91 (17 Dec 2019 12:28) (76 - 93)  BP: 102/64 (17 Dec 2019 12:00) (79/52 - 106/70)  BP(mean): 77 (17 Dec 2019 12:00) (56 - 82)  RR: 24 (17 Dec 2019 12:15) (18 - 34)  SpO2: 100% (17 Dec 2019 12:28) (94% - 100%) I&O's Summary    16 Dec 2019 07:01  -  17 Dec 2019 07:00  --------------------------------------------------------  IN: 553.6 mL / OUT: 20 mL / NET: 533.6 mL    17 Dec 2019 07:01  -  17 Dec 2019 12:48  --------------------------------------------------------  IN: 44.7 mL / OUT: 0 mL / NET: 44.7 mL       GENERAL:  [ ]Alert  [ ]Oriented x   [ ]Lethargic  [ ]Cachexia  [x ]Unarousable  [ ]Verbal  [ ]Non-Verbal  Behavioral:   [ ]Anxiety  [ ]Delirium [ ]Agitation [ ]Other  HEENT:  [ ]Normal   [ ]Dry mouth   [x ]ET Tube/Trach  [ ]Oral lesions  PULMONARY:   [ ]Clear [ ]Tachypnea  [ ]Audible excessive secretions   [x ]Rhonchi        [ ]Right [ ]Left [ ]Bilateral  [ ]Crackles        [ ]Right [ ]Left [ ]Bilateral  [ ]Wheezing     [ ]Right [ ]Left [ ]Bilateral  [ ]Diminished BS [ ] Right [ ]Left [ ]Bilateral  CARDIOVASCULAR:    [x ]Regular [ ]Irregular [ ]Tachy  [ ]Manuelito [ ]Murmur [ ]Other  GASTROINTESTINAL:  [x ]Soft  [ ]Distended   [x ]+BS  [x ]Non tender [ ]Tender  [ ]PEG [ ]OGT/ NGT   Last BM:      GENITOURINARY:  [ ]Normal [ ]Incontinent   [ ]Oliguria/Anuria   [ x]Tony  MUSCULOSKELETAL:   [ ]Normal   [ ]Weakness  [x ]Bed/Wheelchair bound [ ]Edema  NEUROLOGIC: currently sedated  [ ]No focal deficits  [ x] Cognitive impairment  [ ] Dysphagia [ ]Dysarthria [ ] Paresis [ ]Other   SKIN: ecchymoses  [ ]Normal  [ ]Rash   [ ]Pressure ulcer(s) [ ]y [ ]n present on admission    CRITICAL CARE:  [ x]Shock Present  [x ]Septic [ ]Cardiogenic [ ]Neurologic [ ]Hypovolemic  [x ]Vasopressors [ ]Inotropes  [ x]Respiratory failure present [ x]Mechanical Ventilation [ ]Non-invasive ventilatory support [ ]High-Flow  [x ]Acute  [ ]Chronic [ ]Hypoxic  [ ]Hypercarbic [ ]Other  [ x]Other organ failure - renal    LABS:                        12.2   23.21 )-----------( 127      ( 17 Dec 2019 00:15 )             35.7   12-17    130<L>  |  95<L>  |  76<H>  ----------------------------<  185<H>  5.3   |  15<L>  |  4.94<H>    Ca    8.2<L>      17 Dec 2019 00:15  Phos  5.0     12-17  Mg     2.1     12-17    TPro  6.1  /  Alb  2.5<L>  /  TBili  0.7  /  DBili  x   /  AST  554<H>  /  ALT  1387<H>  /  AlkPhos  102  12-17  PT/INR - ( 17 Dec 2019 00:15 )   PT: 15.2 sec;   INR: 1.31 ratio         PTT - ( 17 Dec 2019 00:15 )  PTT:58.0 sec      RADIOLOGY & ADDITIONAL STUDIES: no new imaging studies for review    Protein Calorie Malnutrition Present: [ ]mild [ x]moderate [ ]severe [ ]underweight [ ]morbid obesity  https://www.andeal.org/vault/2440/web/files/ONC/Table_Clinical%20Characteristics%20to%20Document%20Malnutrition-White%20JV%20et%20al%202012.pdf    Height (cm): 167.6 (12-14-19 @ 23:15)  Weight (kg): 75.6 (12-14-19 @ 23:15)  BMI (kg/m2): 26.9 (12-14-19 @ 23:15)    [ x]PPSV2 < or = 30%  [ ]significant weight loss [ x]poor nutritional intake [ ]anasarca   Albumin, Serum: 2.5 g/dL (12-17-19 @ 00:15)   [ ]Artificial Nutrition    REFERRALS:   [ ]Chaplaincy  [ ]Hospice  [ ]Child Life  [x ]Social Work  [ ]Case management [ ]Holistic Therapy     Goals of Care Document: SUBJECTIVE AND OBJECTIVE: Patient seen and examined, remains intubated and sedated.  remains on pressors.family meeting today with mason Cross and Gavin- plan for transfer to PCU for elective extubation. no further blood draws or escalation of care. discussed with MICU team    INTERVAL HPI/OVERNIGHT EVENTS: no acute overnight events.     DNR on chart: Yes    Allergies    No Known Allergies    Intolerances    MEDICATIONS  (STANDING):  artificial  tears Solution 1 Drop(s) Both EYES every 6 hours  aspirin  chewable 81 milliGRAM(s) Oral daily  chlorhexidine 0.12% Liquid 15 milliLiter(s) Oral Mucosa every 12 hours  chlorhexidine 4% Liquid 1 Application(s) Topical <User Schedule>  clopidogrel Tablet 75 milliGRAM(s) Oral daily  dextrose 5%. 1000 milliLiter(s) (50 mL/Hr) IV Continuous <Continuous>  dextrose 50% Injectable 12.5 Gram(s) IV Push once  dextrose 50% Injectable 25 Gram(s) IV Push once  dextrose 50% Injectable 25 Gram(s) IV Push once  insulin lispro (HumaLOG) corrective regimen sliding scale   SubCutaneous every 6 hours  norepinephrine Infusion 0.1 MICROgram(s)/kG/Min (14.175 mL/Hr) IV Continuous <Continuous>  piperacillin/tazobactam IVPB.. 3.375 Gram(s) IV Intermittent every 12 hours    MEDICATIONS  (PRN):  dextrose 40% Gel 15 Gram(s) Oral once PRN Blood Glucose LESS THAN 70 milliGRAM(s)/deciliter  glucagon  Injectable 1 milliGRAM(s) IntraMuscular once PRN Glucose LESS THAN 70 milligrams/deciliter  HYDROmorphone  Injectable 0.5 milliGRAM(s) IV Push every 6 hours PRN pain      ITEMS UNCHECKED ARE NOT PRESENT    PRESENT SYMPTOMS: [x ]Unable to obtain due to poor mentation   Source if other than patient:  [ ]Family   [ ]Team     Pain:  [ ]yes [ ]no  QOL impact -   Location -                    Aggravating factors -  Quality -  Radiation -  Timing-  Severity (0-10 scale):  Minimal acceptable level (0-10 scale):     Dyspnea:                           [ ]Mild [ ]Moderate [ ]Severe  Anxiety:                             [ ]Mild [ ]Moderate [ ]Severe  Fatigue:                             [ ]Mild [ ]Moderate [ ]Severe  Nausea:                             [ ]Mild [ ]Moderate [ ]Severe  Loss of appetite:              [ ]Mild [ ]Moderate [ ]Severe  Constipation:                    [ ]Mild [ ]Moderate [ ]Severe    PAIN AD Score:	0  [email or web address] (Ctrl + left click to view)    Other Symptoms:  [ ]All other review of systems negative     Palliative Performance Status Version 2:       10%      [email or web address]/[email or web address]_performance_[email or web address]_[email or web address]  PHYSICAL EXAM:  Vital Signs Last 24 Hrs  T(C): 37.2 (17 Dec 2019 12:00), Max: 38 (17 Dec 2019 00:00)  T(F): 99 (17 Dec 2019 12:00), Max: 100.4 (17 Dec 2019 00:00)  HR: 91 (17 Dec 2019 12:28) (76 - 93)  BP: 102/64 (17 Dec 2019 12:00) (79/52 - 106/70)  BP(mean): 77 (17 Dec 2019 12:00) (56 - 82)  RR: 24 (17 Dec 2019 12:15) (18 - 34)  SpO2: 100% (17 Dec 2019 12:28) (94% - 100%) I&O's Summary    16 Dec 2019 07:01  -  17 Dec 2019 07:00  --------------------------------------------------------  IN: 553.6 mL / OUT: 20 mL / NET: 533.6 mL    17 Dec 2019 07:01  -  17 Dec 2019 12:48  --------------------------------------------------------  IN: 44.7 mL / OUT: 0 mL / NET: 44.7 mL       GENERAL:  [ ]Alert  [ ]Oriented x   [ ]Lethargic  [ ]Cachexia  [x ]Unarousable  [ ]Verbal  [ ]Non-Verbal  Behavioral:   [ ]Anxiety  [ ]Delirium [ ]Agitation [ ]Other  HEENT:  [ ]Normal   [ ]Dry mouth   [x ]ET Tube/Trach  [ ]Oral lesions  PULMONARY:   [ ]Clear [ ]Tachypnea  [ ]Audible excessive secretions   [x ]Rhonchi        [ ]Right [ ]Left [ ]Bilateral  [ ]Crackles        [ ]Right [ ]Left [ ]Bilateral  [ ]Wheezing     [ ]Right [ ]Left [ ]Bilateral  [ ]Diminished BS [ ] Right [ ]Left [ ]Bilateral  CARDIOVASCULAR:    [x ]Regular [ ]Irregular [ ]Tachy  [ ]Manuelito [ ]Murmur [ ]Other  GASTROINTESTINAL:  [x ]Soft  [ ]Distended   [x ]+BS  [x ]Non tender [ ]Tender  [ ]PEG [ ]OGT/ NGT   Last BM:      GENITOURINARY:  [ ]Normal [ ]Incontinent   [ ]Oliguria/Anuria   [ x]Tony  MUSCULOSKELETAL:   [ ]Normal   [ ]Weakness  [x ]Bed/Wheelchair bound [ ]Edema  NEUROLOGIC: currently sedated  [ ]No focal deficits  [ x] Cognitive impairment  [ ] Dysphagia [ ]Dysarthria [ ] Paresis [ ]Other   SKIN: ecchymoses  [ ]Normal  [ ]Rash   [ ]Pressure ulcer(s) [ ]y [ ]n present on admission    CRITICAL CARE:  [ x]Shock Present  [x ]Septic [ ]Cardiogenic [ ]Neurologic [ ]Hypovolemic  [x ]Vasopressors [ ]Inotropes  [ x]Respiratory failure present [ x]Mechanical Ventilation [ ]Non-invasive ventilatory support [ ]High-Flow  [x ]Acute  [ ]Chronic [ ]Hypoxic  [ ]Hypercarbic [ ]Other  [ x]Other organ failure - renal    LABS:                        12.2   23.21 )-----------( 127      ( 17 Dec 2019 00:15 )             35.7   12-17    130<L>  |  95<L>  |  76<H>  ----------------------------<  185<H>  5.3   |  15<L>  |  4.94<H>    Ca    8.2<L>      17 Dec 2019 00:15  Phos  5.0     12-17  Mg     2.1     12-17    TPro  6.1  /  Alb  2.5<L>  /  TBili  0.7  /  DBili  x   /  AST  554<H>  /  ALT  1387<H>  /  AlkPhos  102  12-17  PT/INR - ( 17 Dec 2019 00:15 )   PT: 15.2 sec;   INR: 1.31 ratio         PTT - ( 17 Dec 2019 00:15 )  PTT:58.0 sec      RADIOLOGY & ADDITIONAL STUDIES: no new imaging studies for review    Protein Calorie Malnutrition Present: [ ]mild [ x]moderate [ ]severe [ ]underweight [ ]morbid obesity  https://www.andeal.org/vault/2440/web/files/ONC/Table_Clinical%20Characteristics%20to%20Document%20Malnutrition-White%20JV%20et%20al%941294.pdf    Height (cm): 167.6 (12-14-19 @ 23:15)  Weight (kg): 75.6 (12-14-19 @ 23:15)  BMI (kg/m2): 26.9 (12-14-19 @ 23:15)    [ x]PPSV2 < or = 30%  [ ]significant weight loss [ x]poor nutritional intake [ ]anasarca   Albumin, Serum: 2.5 g/dL (12-17-19 @ 00:15)   [ ]Artificial Nutrition    REFERRALS:   [ ]Chaplaincy  [ ]Hospice  [ ]Child Life  [x ]Social Work  [ ]Case management [ ]Holistic Therapy     Goals of Care Document:

## 2019-12-18 LAB
CULTURE RESULTS: NO GROWTH — SIGNIFICANT CHANGE UP
SPECIMEN SOURCE: SIGNIFICANT CHANGE UP

## 2019-12-18 PROCEDURE — 99291 CRITICAL CARE FIRST HOUR: CPT

## 2019-12-18 PROCEDURE — 99232 SBSQ HOSP IP/OBS MODERATE 35: CPT

## 2019-12-18 RX ADMIN — HYDROMORPHONE HYDROCHLORIDE 0.2 MILLIGRAM(S): 2 INJECTION INTRAMUSCULAR; INTRAVENOUS; SUBCUTANEOUS at 10:09

## 2019-12-18 RX ADMIN — Medication 1 DROP(S): at 18:15

## 2019-12-18 RX ADMIN — HYDROMORPHONE HYDROCHLORIDE 0.2 MILLIGRAM(S): 2 INJECTION INTRAMUSCULAR; INTRAVENOUS; SUBCUTANEOUS at 18:26

## 2019-12-18 RX ADMIN — HYDROMORPHONE HYDROCHLORIDE 0.2 MILLIGRAM(S): 2 INJECTION INTRAMUSCULAR; INTRAVENOUS; SUBCUTANEOUS at 05:00

## 2019-12-18 RX ADMIN — HYDROMORPHONE HYDROCHLORIDE 0.2 MILLIGRAM(S): 2 INJECTION INTRAMUSCULAR; INTRAVENOUS; SUBCUTANEOUS at 10:39

## 2019-12-18 RX ADMIN — PIPERACILLIN AND TAZOBACTAM 25 GRAM(S): 4; .5 INJECTION, POWDER, LYOPHILIZED, FOR SOLUTION INTRAVENOUS at 18:13

## 2019-12-18 RX ADMIN — PIPERACILLIN AND TAZOBACTAM 25 GRAM(S): 4; .5 INJECTION, POWDER, LYOPHILIZED, FOR SOLUTION INTRAVENOUS at 05:10

## 2019-12-18 RX ADMIN — CHLORHEXIDINE GLUCONATE 15 MILLILITER(S): 213 SOLUTION TOPICAL at 18:15

## 2019-12-18 RX ADMIN — Medication 1 DROP(S): at 13:24

## 2019-12-18 RX ADMIN — Medication 14.18 MICROGRAM(S)/KG/MIN: at 04:49

## 2019-12-18 RX ADMIN — CHLORHEXIDINE GLUCONATE 1 APPLICATION(S): 213 SOLUTION TOPICAL at 05:09

## 2019-12-18 RX ADMIN — CHLORHEXIDINE GLUCONATE 15 MILLILITER(S): 213 SOLUTION TOPICAL at 05:09

## 2019-12-18 RX ADMIN — Medication 1 DROP(S): at 05:09

## 2019-12-18 RX ADMIN — Medication 14.18 MICROGRAM(S)/KG/MIN: at 18:16

## 2019-12-18 RX ADMIN — HYDROMORPHONE HYDROCHLORIDE 0.2 MILLIGRAM(S): 2 INJECTION INTRAMUSCULAR; INTRAVENOUS; SUBCUTANEOUS at 04:49

## 2019-12-18 RX ADMIN — Medication 0.2 MILLIGRAM(S): at 16:11

## 2019-12-18 NOTE — PROGRESS NOTE ADULT - SUBJECTIVE AND OBJECTIVE BOX
SUBJECTIVE AND OBJECTIVE:  INTERVAL HPI/OVERNIGHT EVENTS:    DNR on chart: Yes    Allergies    No Known Allergies    Intolerances    MEDICATIONS  (STANDING):  artificial  tears Solution 1 Drop(s) Both EYES every 6 hours  aspirin  chewable 81 milliGRAM(s) Oral daily  chlorhexidine 0.12% Liquid 15 milliLiter(s) Oral Mucosa every 12 hours  chlorhexidine 4% Liquid 1 Application(s) Topical <User Schedule>  clopidogrel Tablet 75 milliGRAM(s) Oral daily  norepinephrine Infusion 0.1 MICROgram(s)/kG/Min (14.175 mL/Hr) IV Continuous <Continuous>  piperacillin/tazobactam IVPB.. 3.375 Gram(s) IV Intermittent every 12 hours    MEDICATIONS  (PRN):  HYDROmorphone  Injectable 0.2 milliGRAM(s) IV Push every 1 hour PRN pain  HYDROmorphone  Injectable 0.2 milliGRAM(s) IV Push every 1 hour PRN dyspnea  LORazepam   Injectable 0.2 milliGRAM(s) IV Push every 1 hour PRN anxiety/agitation      ITEMS UNCHECKED ARE NOT PRESENT    PRESENT SYMPTOMS: [ ]Unable to obtain due to poor mentation   Source if other than patient:  [ ]Family   [ ]Team     Pain:  [ ]yes [ ]no  QOL impact -   Location -                    Aggravating factors -  Quality -  Radiation -  Timing-  Severity (0-10 scale):  Minimal acceptable level (0-10 scale):     Dyspnea:                           [ ]Mild [ ]Moderate [ ]Severe  Anxiety:                             [ ]Mild [ ]Moderate [ ]Severe  Fatigue:                             [ ]Mild [ ]Moderate [ ]Severe  Nausea:                             [ ]Mild [ ]Moderate [ ]Severe  Loss of appetite:              [ ]Mild [ ]Moderate [ ]Severe  Constipation:                    [ ]Mild [ ]Moderate [ ]Severe    PAIN AD Score:	  [email or web address] (Ctrl + left click to view)    Other Symptoms:  [ ]All other review of systems negative     Palliative Performance Status Version 2:         %      http://npcrc.org/files/news/palliative_performance_scale_ppsv2.pdf  PHYSICAL EXAM:  Vital Signs Last 24 Hrs  T(C): 37.2 (18 Dec 2019 08:00), Max: 37.2 (17 Dec 2019 12:00)  T(F): 99 (18 Dec 2019 08:00), Max: 99 (17 Dec 2019 12:00)  HR: 60 (18 Dec 2019 11:00) (59 - 133)  BP: 88/56 (18 Dec 2019 11:00) (75/49 - 128/73)  BP(mean): 67 (18 Dec 2019 11:00) (58 - 93)  RR: 20 (18 Dec 2019 11:00) (11 - 30)  SpO2: 100% (18 Dec 2019 11:00) (89% - 100%) I&O's Summary    17 Dec 2019 07:01  -  18 Dec 2019 07:00  --------------------------------------------------------  IN: 370.4 mL / OUT: 0 mL / NET: 370.4 mL    18 Dec 2019 07:01  -  18 Dec 2019 11:47  --------------------------------------------------------  IN: 24.2 mL / OUT: 0 mL / NET: 24.2 mL       GENERAL:  [ ]Alert  [ ]Oriented x   [ ]Lethargic  [ ]Cachexia  [ ]Unarousable  [ ]Verbal  [ ]Non-Verbal  Behavioral:   [ ]Anxiety  [ ]Delirium [ ]Agitation [ ]Other  HEENT:  [ ]Normal   [ ]Dry mouth   [ ]ET Tube/Trach  [ ]Oral lesions  PULMONARY:   [ ]Clear [ ]Tachypnea  [ ]Audible excessive secretions   [ ]Rhonchi        [ ]Right [ ]Left [ ]Bilateral  [ ]Crackles        [ ]Right [ ]Left [ ]Bilateral  [ ]Wheezing     [ ]Right [ ]Left [ ]Bilateral  [ ]Diminished BS [ ] Right [ ]Left [ ]Bilateral  CARDIOVASCULAR:    [ ]Regular [ ]Irregular [ ]Tachy  [ ]Manuelito [ ]Murmur [ ]Other  GASTROINTESTINAL:  [ ]Soft  [ ]Distended   [ ]+BS  [ ]Non tender [ ]Tender  [ ]PEG [ ]OGT/ NGT   Last BM:      GENITOURINARY:  [ ]Normal [ ]Incontinent   [ ]Oliguria/Anuria   [ ]Tony  MUSCULOSKELETAL:   [ ]Normal   [ ]Weakness  [ ]Bed/Wheelchair bound [ ]Edema  NEUROLOGIC:   [ ]No focal deficits  [ ] Cognitive impairment  [ ] Dysphagia [ ]Dysarthria [ ] Paresis [ ]Other   SKIN:   [ ]Normal  [ ]Rash   [ ]Pressure ulcer(s) [ ]y [ ]n present on admission    CRITICAL CARE:  [ ]Shock Present  [ ]Septic [ ]Cardiogenic [ ]Neurologic [ ]Hypovolemic  [ ]Vasopressors [ ]Inotropes  [ ]Respiratory failure present [ ]Mechanical Ventilation [ ]Non-invasive ventilatory support [ ]High-Flow  [ ]Acute  [ ]Chronic [ ]Hypoxic  [ ]Hypercarbic [ ]Other  [ ]Other organ failure     LABS:                        12.2   23.21 )-----------( 127      ( 17 Dec 2019 00:15 )             35.7   12-17    130<L>  |  95<L>  |  76<H>  ----------------------------<  185<H>  5.3   |  15<L>  |  4.94<H>    Ca    8.2<L>      17 Dec 2019 00:15  Phos  5.0     12-17  Mg     2.1     12-17    TPro  6.1  /  Alb  2.5<L>  /  TBili  0.7  /  DBili  x   /  AST  554<H>  /  ALT  1387<H>  /  AlkPhos  102  12-17  PT/INR - ( 17 Dec 2019 00:15 )   PT: 15.2 sec;   INR: 1.31 ratio         PTT - ( 17 Dec 2019 00:15 )  PTT:58.0 sec      RADIOLOGY & ADDITIONAL STUDIES:    Protein Calorie Malnutrition Present: [ ]mild [ ]moderate [ ]severe [ ]underweight [ ]morbid obesity  https://www.andeal.org/vault/2440/web/files/ONC/Table_Clinical%20Characteristics%20to%20Document%20Malnutrition-White%20JV%20et%20al%202012.pdf    Height (cm): 167.6 (12-14-19 @ 23:15)  Weight (kg): 75.6 (12-14-19 @ 23:15)  BMI (kg/m2): 26.9 (12-14-19 @ 23:15)    [ ]PPSV2 < or = 30%  [ ]significant weight loss [ ]poor nutritional intake [ ]anasarca   Albumin, Serum: 2.5 g/dL (12-17-19 @ 00:15)   [ ]Artificial Nutrition    REFERRALS:   [ ]Chaplaincy  [ ]Hospice  [ ]Child Life  [ ]Social Work  [ ]Case management [ ]Holistic Therapy     Goals of Care Document: SUBJECTIVE AND OBJECTIVE: Patient seen and examined, utilized x2 breakthrough doses of dilaudid over last 24 hours. appears comfortable on examination, no non verbal signs of pain or distress. Sons Tay and Gavin at bedside, aware of transfer to PCU planned for today. Plan for elective extubation TBD. Emotional support provided.     INTERVAL HPI/OVERNIGHT EVENTS: no acute overnight events, x2 dose of IV dilaudid prn for breakthrough pain/dyspnea.    DNR on chart: Yes    Allergies    No Known Allergies    Intolerances    MEDICATIONS  (STANDING):  artificial  tears Solution 1 Drop(s) Both EYES every 6 hours  aspirin  chewable 81 milliGRAM(s) Oral daily  chlorhexidine 0.12% Liquid 15 milliLiter(s) Oral Mucosa every 12 hours  chlorhexidine 4% Liquid 1 Application(s) Topical <User Schedule>  clopidogrel Tablet 75 milliGRAM(s) Oral daily  norepinephrine Infusion 0.1 MICROgram(s)/kG/Min (14.175 mL/Hr) IV Continuous <Continuous>  piperacillin/tazobactam IVPB.. 3.375 Gram(s) IV Intermittent every 12 hours    MEDICATIONS  (PRN):  HYDROmorphone  Injectable 0.2 milliGRAM(s) IV Push every 1 hour PRN pain  HYDROmorphone  Injectable 0.2 milliGRAM(s) IV Push every 1 hour PRN dyspnea  LORazepam   Injectable 0.2 milliGRAM(s) IV Push every 1 hour PRN anxiety/agitation      ITEMS UNCHECKED ARE NOT PRESENT    PRESENT SYMPTOMS: [x ]Unable to obtain due to poor mentation   Source if other than patient:  [ ]Family   [ ]Team     Pain:  [ ]yes [ ]no  QOL impact -   Location -                    Aggravating factors -  Quality -  Radiation -  Timing-  Severity (0-10 scale):  Minimal acceptable level (0-10 scale):     Dyspnea:                           [ ]Mild [ ]Moderate [ ]Severe  Anxiety:                             [ ]Mild [ ]Moderate [ ]Severe  Fatigue:                             [ ]Mild [ ]Moderate [ ]Severe  Nausea:                             [ ]Mild [ ]Moderate [ ]Severe  Loss of appetite:              [ ]Mild [ ]Moderate [ ]Severe  Constipation:                    [ ]Mild [ ]Moderate [ ]Severe    PAIN AD Score:	0  [email or web address].[email or web address]/[email or web address].pdf (Ctrl + left click to view)    Other Symptoms:  [ ]All other review of systems negative     Palliative Performance Status Version 2:     10    %      http://npcrc.org/files/news/palliative_performance_scale_ppsv2.pdf  PHYSICAL EXAM:  Vital Signs Last 24 Hrs  T(C): 37.2 (18 Dec 2019 08:00), Max: 37.2 (17 Dec 2019 12:00)  T(F): 99 (18 Dec 2019 08:00), Max: 99 (17 Dec 2019 12:00)  HR: 60 (18 Dec 2019 11:00) (59 - 133)  BP: 88/56 (18 Dec 2019 11:00) (75/49 - 128/73)  BP(mean): 67 (18 Dec 2019 11:00) (58 - 93)  RR: 20 (18 Dec 2019 11:00) (11 - 30)  SpO2: 100% (18 Dec 2019 11:00) (89% - 100%) I&O's Summary    17 Dec 2019 07:01  -  18 Dec 2019 07:00  --------------------------------------------------------  IN: 370.4 mL / OUT: 0 mL / NET: 370.4 mL    18 Dec 2019 07:01  -  18 Dec 2019 11:47  --------------------------------------------------------  IN: 24.2 mL / OUT: 0 mL / NET: 24.2 mL       GENERAL:  [ ]Alert  [ ]Oriented x   [ ]Lethargic  [ ]Cachexia  [x ]Unarousable  [ ]Verbal  [ ]Non-Verbal  Behavioral:   [ ]Anxiety  [ ]Delirium [ ]Agitation [ ]Other  HEENT:  [ ]Normal   [ ]Dry mouth   [ x]ET Tube/Trach  [ ]Oral lesions  PULMONARY:   [ ]Clear [ ]Tachypnea  [ ]Audible excessive secretions   [ ]Rhonchi        [ ]Right [ ]Left [ ]Bilateral  [x ]Crackles        [ ]Right [ ]Left [ ]Bilateral  [ ]Wheezing     [ ]Right [ ]Left [ ]Bilateral  [ ]Diminished BS [ ] Right [ ]Left [ ]Bilateral  CARDIOVASCULAR:    [x ]Regular [ ]Irregular [ ]Tachy  [ ]Manuelito [ ]Murmur [ ]Other  GASTROINTESTINAL:  [x ]Soft  [ ]Distended   [ x]+BS  [ x]Non tender [ ]Tender  [ ]PEG [ ]OGT/ NGT   Last BM:   GENITOURINARY:  [ ]Normal [ ]Incontinent   [ ]Oliguria/Anuria   [ x]Tony  MUSCULOSKELETAL:   [ ]Normal   [ x]Weakness  [ ]Bed/Wheelchair bound [ ]Edema  NEUROLOGIC: unresponsive  [ ]No focal deficits  [ ] Cognitive impairment  [ ] Dysphagia [ ]Dysarthria [ ] Paresis [ ]Other   SKIN: ecchymoses  [ ]Normal  [ ]Rash   [ ]Pressure ulcer(s) [ ]y [ ]n present on admission    CRITICAL CARE:  [x ]Shock Present  [x ]Septic [ ]Cardiogenic [ ]Neurologic [ ]Hypovolemic  [x ]Vasopressors [ ]Inotropes  [ x]Respiratory failure present [x ]Mechanical Ventilation [ ]Non-invasive ventilatory support [ ]High-Flow  [ ]Acute  [ ]Chronic [ ]Hypoxic  [ ]Hypercarbic [ ]Other  [x ]Other organ failure - renal    LABS:                        12.2   23.21 )-----------( 127      ( 17 Dec 2019 00:15 )             35.7   12-17    130<L>  |  95<L>  |  76<H>  ----------------------------<  185<H>  5.3   |  15<L>  |  4.94<H>    Ca    8.2<L>      17 Dec 2019 00:15  Phos  5.0     12-17  Mg     2.1     12-17    TPro  6.1  /  Alb  2.5<L>  /  TBili  0.7  /  DBili  x   /  AST  554<H>  /  ALT  1387<H>  /  AlkPhos  102  12-17  PT/INR - ( 17 Dec 2019 00:15 )   PT: 15.2 sec;   INR: 1.31 ratio   PTT - ( 17 Dec 2019 00:15 )  PTT:58.0 sec      RADIOLOGY & ADDITIONAL STUDIES: no new imaging studies for review    Protein Calorie Malnutrition Present: [ ]mild [ ]moderate [ ]severe [ ]underweight [ ]morbid obesity  https://www.andeal.org/vault/3449/web/files/ONC/Table_Clinical%20Characteristics%20to%20Document%20Malnutrition-White%20JV%20et%20al%765447.pdf    Height (cm): 167.6 (12-14-19 @ 23:15)  Weight (kg): 75.6 (12-14-19 @ 23:15)  BMI (kg/m2): 26.9 (12-14-19 @ 23:15)    [x ]PPSV2 < or = 30%  [ ]significant weight loss [ x]poor nutritional intake [ ]anasarca   Albumin, Serum: 2.5 g/dL (12-17-19 @ 00:15)   [ ]Artificial Nutrition    REFERRALS:   [ ]Chaplaincy  [ ]Hospice  [ ]Child Life  [ x]Social Work  [ ]Case management [ ]Holistic Therapy     Goals of Care Document:

## 2019-12-18 NOTE — PROGRESS NOTE ADULT - SUBJECTIVE AND OBJECTIVE BOX
Navi Martin, PGY2   Pager [phone]    INTERVAL HPI/OVERNIGHT EVENTS:    SUBJECTIVE: Patient seen and examined at bedside.     CONSTITUTIONAL: No fevers, chills, weakness  HEENT: No headache, acute visual changes, throat pain  NECK: No pain or stiffness  RESPIRATORY: No sob, cough, wheezing, hemoptysis  CARDIOVASCULAR: No chest pain or palpitations  GASTROINTESTINAL: No abdominal pain, nausea, vomiting, constipation, or diarrhea  GENITOURINARY: No dysuria, frequency or hematuria  NEUROLOGICAL: No numbness or weakness  SKIN: No rash or itching    OBJECTIVE:    VITAL SIGNS:  ICU Vital Signs Last 24 Hrs  T(C): 36.7 (18 Dec 2019 04:00), Max: 37.4 (17 Dec 2019 07:30)  T(F): 98 (18 Dec 2019 04:00), Max: 99.3 (17 Dec 2019 07:30)  HR: 84 (18 Dec 2019 06:00) (74 - 133)  BP: 87/51 (18 Dec 2019 06:00) (75/49 - 128/73)  BP(mean): 63 (18 Dec 2019 06:00) (56 - 93)  ABP: --  ABP(mean): --  RR: 21 (18 Dec 2019 06:00) (11 - 30)  SpO2: 100% (18 Dec 2019 06:00) (89% - 100%)    Mode: AC/ CMV (Assist Control/ Continuous Mandatory Ventilation), RR (machine): 18, TV (machine): 350, FiO2: 35, PEEP: 5, ITime: 0.8, MAP: 10, PIP: 20    12-16 @ 07:01 - 12-17 @ 07:00  --------------------------------------------------------  IN: 553.6 mL / OUT: 20 mL / NET: 533.6 mL    12-17 @ 07:01  -  12-18 @ 06:37  --------------------------------------------------------  IN: 370.4 mL / OUT: 0 mL / NET: 370.4 mL      CAPILLARY BLOOD GLUCOSE  180 (17 Dec 2019 12:00)      POCT Blood Glucose.: 180 mg/dL (17 Dec 2019 11:54)      PHYSICAL EXAM:  General: NAD  HEENT: NCAT, PERRL, clear conjunctiva, mmm  Neck: supple, no JVD  Respiratory: CTAB  Cardiovascular: RRR, S1S2, no m/r/g  Abdomen: soft, nontender, nondistended, normal bowel sounds  Extremities: no edema or cyanosis  Skin: warm, perfused, normal turgor  Neurological: nonfocal    MEDICATIONS:  MEDICATIONS  (STANDING):  artificial  tears Solution 1 Drop(s) Both EYES every 6 hours  aspirin  chewable 81 milliGRAM(s) Oral daily  chlorhexidine 0.12% Liquid 15 milliLiter(s) Oral Mucosa every 12 hours  chlorhexidine 4% Liquid 1 Application(s) Topical <User Schedule>  clopidogrel Tablet 75 milliGRAM(s) Oral daily  norepinephrine Infusion 0.1 MICROgram(s)/kG/Min (14.175 mL/Hr) IV Continuous <Continuous>  piperacillin/tazobactam IVPB.. 3.375 Gram(s) IV Intermittent every 12 hours    MEDICATIONS  (PRN):  HYDROmorphone  Injectable 0.2 milliGRAM(s) IV Push every 1 hour PRN pain  HYDROmorphone  Injectable 0.2 milliGRAM(s) IV Push every 1 hour PRN dyspnea  LORazepam   Injectable 0.2 milliGRAM(s) IV Push every 1 hour PRN anxiety/agitation      ALLERGIES:  Allergies    No Known Allergies    Intolerances        LABS:                        12.2   23.21 )-----------( 127      ( 17 Dec 2019 00:15 )             35.7     12-17    130<L>  |  95<L>  |  76<H>  ----------------------------<  185<H>  5.3   |  15<L>  |  4.94<H>    Ca    8.2<L>      17 Dec 2019 00:15  Phos  5.0     12-17  Mg     2.1     12-17    TPro  6.1  /  Alb  2.5<L>  /  TBili  0.7  /  DBili  x   /  AST  554<H>  /  ALT  1387<H>  /  AlkPhos  102  12-17    PT/INR - ( 17 Dec 2019 00:15 )   PT: 15.2 sec;   INR: 1.31 ratio         PTT - ( 17 Dec 2019 00:15 )  PTT:58.0 sec      RADIOLOGY & ADDITIONAL TESTS: Reviewed. Navi Martin, PGY2   Pager [phone]    INTERVAL HPI/OVERNIGHT EVENTS: GIOVANNY    SUBJECTIVE: Patient seen and examined at bedside. Continues to be largely unresponsive.    ROS unable to be obtained as unresponsive    OBJECTIVE:    VITAL SIGNS:  ICU Vital Signs Last 24 Hrs  T(C): 36.7 (18 Dec 2019 04:00), Max: 37.4 (17 Dec 2019 07:30)  T(F): 98 (18 Dec 2019 04:00), Max: 99.3 (17 Dec 2019 07:30)  HR: 84 (18 Dec 2019 06:00) (74 - 133)  BP: 87/51 (18 Dec 2019 06:00) (75/49 - 128/73)  BP(mean): 63 (18 Dec 2019 06:00) (56 - 93)  ABP: --  ABP(mean): --  RR: 21 (18 Dec 2019 06:00) (11 - 30)  SpO2: 100% (18 Dec 2019 06:00) (89% - 100%)    Mode: AC/ CMV (Assist Control/ Continuous Mandatory Ventilation), RR (machine): 18, TV (machine): 350, FiO2: 35, PEEP: 5, ITime: 0.8, MAP: 10, PIP: 20    12-16 @ 07:01 - 12-17 @ 07:00  --------------------------------------------------------  IN: 553.6 mL / OUT: 20 mL / NET: 533.6 mL    12-17 @ 07:01 - 12-18 @ 06:37  --------------------------------------------------------  IN: 370.4 mL / OUT: 0 mL / NET: 370.4 mL      CAPILLARY BLOOD GLUCOSE  180 (17 Dec 2019 12:00)      POCT Blood Glucose.: 180 mg/dL (17 Dec 2019 11:54)      PHYSICAL EXAM:  General: NAD  HEENT: NCAT, PERRL, clear conjunctiva, mmm  Neck: supple, no JVD  Respiratory: scattered coarse breath sounds  Cardiovascular: RRR, S1S2, no m/r/g  Abdomen: soft, nontender, mildly distended, normal bowel sounds  Extremities: dependent edema, no cyanosis  Skin: warm, perfused, normal turgor  Neurological: nonfocal    MEDICATIONS:  MEDICATIONS  (STANDING):  artificial  tears Solution 1 Drop(s) Both EYES every 6 hours  aspirin  chewable 81 milliGRAM(s) Oral daily  chlorhexidine 0.12% Liquid 15 milliLiter(s) Oral Mucosa every 12 hours  chlorhexidine 4% Liquid 1 Application(s) Topical <User Schedule>  clopidogrel Tablet 75 milliGRAM(s) Oral daily  norepinephrine Infusion 0.1 MICROgram(s)/kG/Min (14.175 mL/Hr) IV Continuous <Continuous>  piperacillin/tazobactam IVPB.. 3.375 Gram(s) IV Intermittent every 12 hours    MEDICATIONS  (PRN):  HYDROmorphone  Injectable 0.2 milliGRAM(s) IV Push every 1 hour PRN pain  HYDROmorphone  Injectable 0.2 milliGRAM(s) IV Push every 1 hour PRN dyspnea  LORazepam   Injectable 0.2 milliGRAM(s) IV Push every 1 hour PRN anxiety/agitation      ALLERGIES:  Allergies    No Known Allergies    Intolerances        LABS:                        12.2   23.21 )-----------( 127      ( 17 Dec 2019 00:15 )             35.7     12-17    130<L>  |  95<L>  |  76<H>  ----------------------------<  185<H>  5.3   |  15<L>  |  4.94<H>    Ca    8.2<L>      17 Dec 2019 00:15  Phos  5.0     12-17  Mg     2.1     12-17    TPro  6.1  /  Alb  2.5<L>  /  TBili  0.7  /  DBili  x   /  AST  554<H>  /  ALT  1387<H>  /  AlkPhos  102  12-17    PT/INR - ( 17 Dec 2019 00:15 )   PT: 15.2 sec;   INR: 1.31 ratio         PTT - ( 17 Dec 2019 00:15 )  PTT:58.0 sec      RADIOLOGY & ADDITIONAL TESTS: Reviewed.

## 2019-12-18 NOTE — PROGRESS NOTE ADULT - ASSESSMENT
Ms. Seay is a 89 yo F with a PMH significant for chronic UTI, wheel chair bound, dementia, afib, BiV pacemaker, HLD, gallstone pancreatitis p/w AMS. She has had nasal congestion, rhionrrhea, and cough with and without eating. Admitted for septic shock secondary to ?UTI. Now intubated, sedated. Palliative care consulted for GOC

## 2019-12-18 NOTE — CHART NOTE - NSCHARTNOTEFT_GEN_A_CORE
Patient arrived to PCU. Intubated and unresponsive. Two sons at bedside. Plan for comfort care with plans for compassionate extubation at some point. Supportive care given to sons. Patient appears comfortable on assessment with no signs of pain or agitation.

## 2019-12-18 NOTE — PROGRESS NOTE ADULT - PROBLEM SELECTOR PLAN 4
recommend dilaudid 0.2mg q1h prn for pain, q1h prn for dyspnea  ativan 0.2mg q1h prn anxiety/agitation  received x2 breakthrough doses of dilaudid in last 24 hours. continue current regimen
transfer to pcu when bed available  recommend dilaudid 0.2mg q1h prn for pain, q1h prn for dyspnea  ativan 0.2mg q1h prn anxiety/agitation

## 2019-12-18 NOTE — PROGRESS NOTE ADULT - ATTENDING COMMENTS
1. Acute hypoxemic respiratory failure from septic and cardiogenic shock. Continue current ac vent settings.  2.Cardiogenic shock from STEMI. EF severely reduced on bedside echocardiogram. H/O severe AS. On heparin, plavix asa.  3.ID. UTI, aspiration pneumonia colitis  4. Shock liver. Follow LFTs  5 Acute renal failure from ATN.  6. DNR. Family discussing comfort care.
Patient examined and case reviewed in detail on bedside rounds  Critically ill on vent with dementia/MI   Frequent bedside visits with therapy change today. Crit Care Time Today 35 min +
1. Acute hypoxemic respiratory failure from septic and cardiogenic shock. Continue current ac vent settings.  2.Cardiogenic shock from STEMI. EF severely reduced on bedside echocardiogram. H/O severe AS. On heparin, plavix asa.  3.ID. UTI, aspiration pneumonia colitis  4. Shock liver. Follow LFTs  5 Acute renal failure from ATN. Creatinine  rising.  6. DNR. Family discussing comfort care.
1. Acute hypoxemic respiratory failure from septic and cardiogenic shock. Continue current ac vent settings.  2.Cardiogenic shock from STEMI. EF severely reduced on bedside echocardiogram. H/O severe AS., plavix asa.  3.ID. UTI, aspiration pneumonia colitis  4. Shock liver. Follow LFTs  5 Acute renal failure from ATN. Creatinine  rising.  6. DNR. Family to go to PCU when bed available. Stop  blood draws.

## 2019-12-18 NOTE — PROGRESS NOTE ADULT - ASSESSMENT
89 year old woman with dementia, wheelchair bound, chronic UTI, Afib, BiV pacemaker, HLD, gallstone pancreatitis off all home medications presented with AMS admitted for septic shock 2/2 UTI and NSTEMI/cardiogenic shock.    #Neuro:  - Prop dc'ed 7pm 12/15 and continues to remain largely unresponsive (grimaces and withdraws to painful stimuli but otherwise remains unresponsive)  - Dementia, wheelchair bound, minimally verbal at baseline (says yes and no at baseline), requires 24 hour aide    # CV:  1. NSTEMI  - Patient found to have ST elevations on EKG not meeting Sgarbossa's criteria with elevated troponin of >10,000  - Cardiology consulted; patient not a candidate for cath given advanced age and poor clinical status  - Medical mgmt with asa, plavix, and heparin drip; dc hep gtt in AM on 12/17    2. Septic/cardiogenic shock   - Likely in the setting of UTI vs stercoral colitis vs aspiration pneumonia and NSTEMI  - no further escalation in levo  - c/w Zosyn for UTI vs aspiration pneumonia coverage    3. Afib  - Off anticoagulation at home  - Remains V paced with BiV pacemaker    # Pulm:  1. Intubated for airway protection  - plan for extubation in PCU    2. Possible aspiration PNA  - Patient coughing with feeding, hx of dementia  - c/w zosyn (12/15-12/21)    3. Bilateral pleural effusions  - Holding diuresis given DENISE and euvolemia    #GI:  1. Possible stercoral colitis, transmural wall thickening on CT  - Bowel regimen, s/p disimpaction  - Continue with Zosyn   - No tube feeds, will focus on comfort care    2. Transaminitis, likely shock liver  - Downtrended  - No further blood draws, will focus on comfort care    #/Renal:  1. DENISE, likely both pre-renal and ATN in setting of septic shock  - No further blood draws, will focus on comfort care    2. Lactic acidosis, intubated and paralyzed  - Downtrended  - No further blood draws, will focus on comfort care    #Endo  - dc ISS, will focus on comfort care    #Skin  S/p skin biopsy on Wednesday on RUE  - Routine wound dressing    #Heme/onc:  - Stable leukocytosis in the setting of septic shock  - No further blood draws, will focus on comfort care    #ID:  Septic shock  - Likely secondary to UTI vs aspiration pneumonia vs stercoral colitis  - c/w Zosyn (12/15-12/21)  - f/u UCx, GNR  - f/u BCx, NTD  - f/u sputum Cx, NTD    # DVT ppx  - Hep gtt dc'ed, no further escalation in care, will focus on comfort care    # GOC  - Patient is DNR, no further escalation in care, no tube feeds, will focus on comfort care  - transfer to pcu when bed available  - dilaudid 0.2mg q1h prn for pain, q1h prn for dyspnea, ativan 0.2mg q1h prn anxiety/agitation.

## 2019-12-18 NOTE — PROGRESS NOTE ADULT - PROBLEM SELECTOR PLAN 3
sons are HCP, copy in chart  DNR, plan for elective extubation in PCU, to transfer today 12/18  no escalation of care
sons are HCP, copy in chart  DNR, plan for elective extubation in PCU when bed available  no escalation of care, goal is comfort

## 2019-12-18 NOTE — PROGRESS NOTE ADULT - PROBLEM SELECTOR PLAN 2
on pressors  no further escalation of care  no further blood draws
on pressors  no further escalation of care  no further blood draws

## 2019-12-19 ENCOUNTER — APPOINTMENT (OUTPATIENT)
Dept: ELECTROPHYSIOLOGY | Facility: CLINIC | Age: 84
End: 2019-12-19

## 2019-12-19 VITALS — HEART RATE: 74 BPM | OXYGEN SATURATION: 100 %

## 2019-12-19 PROCEDURE — 87581 M.PNEUMON DNA AMP PROBE: CPT

## 2019-12-19 PROCEDURE — 31500 INSERT EMERGENCY AIRWAY: CPT

## 2019-12-19 PROCEDURE — 87486 CHLMYD PNEUM DNA AMP PROBE: CPT

## 2019-12-19 PROCEDURE — 74176 CT ABD & PELVIS W/O CONTRAST: CPT

## 2019-12-19 PROCEDURE — 85027 COMPLETE CBC AUTOMATED: CPT

## 2019-12-19 PROCEDURE — 87798 DETECT AGENT NOS DNA AMP: CPT

## 2019-12-19 PROCEDURE — 96366 THER/PROPH/DIAG IV INF ADDON: CPT | Mod: XU

## 2019-12-19 PROCEDURE — 94640 AIRWAY INHALATION TREATMENT: CPT

## 2019-12-19 PROCEDURE — 96365 THER/PROPH/DIAG IV INF INIT: CPT | Mod: XU

## 2019-12-19 PROCEDURE — 85730 THROMBOPLASTIN TIME PARTIAL: CPT

## 2019-12-19 PROCEDURE — 83605 ASSAY OF LACTIC ACID: CPT

## 2019-12-19 PROCEDURE — 96368 THER/DIAG CONCURRENT INF: CPT | Mod: XU

## 2019-12-19 PROCEDURE — 82947 ASSAY GLUCOSE BLOOD QUANT: CPT

## 2019-12-19 PROCEDURE — 84100 ASSAY OF PHOSPHORUS: CPT

## 2019-12-19 PROCEDURE — 80048 BASIC METABOLIC PNL TOTAL CA: CPT

## 2019-12-19 PROCEDURE — 82550 ASSAY OF CK (CPK): CPT

## 2019-12-19 PROCEDURE — 71045 X-RAY EXAM CHEST 1 VIEW: CPT

## 2019-12-19 PROCEDURE — 85014 HEMATOCRIT: CPT

## 2019-12-19 PROCEDURE — 94002 VENT MGMT INPAT INIT DAY: CPT

## 2019-12-19 PROCEDURE — 81001 URINALYSIS AUTO W/SCOPE: CPT

## 2019-12-19 PROCEDURE — 82962 GLUCOSE BLOOD TEST: CPT

## 2019-12-19 PROCEDURE — 84132 ASSAY OF SERUM POTASSIUM: CPT

## 2019-12-19 PROCEDURE — 82553 CREATINE MB FRACTION: CPT

## 2019-12-19 PROCEDURE — 85610 PROTHROMBIN TIME: CPT

## 2019-12-19 PROCEDURE — 83036 HEMOGLOBIN GLYCOSYLATED A1C: CPT

## 2019-12-19 PROCEDURE — 83690 ASSAY OF LIPASE: CPT

## 2019-12-19 PROCEDURE — 84295 ASSAY OF SERUM SODIUM: CPT

## 2019-12-19 PROCEDURE — 93306 TTE W/DOPPLER COMPLETE: CPT

## 2019-12-19 PROCEDURE — 87086 URINE CULTURE/COLONY COUNT: CPT

## 2019-12-19 PROCEDURE — 87633 RESP VIRUS 12-25 TARGETS: CPT

## 2019-12-19 PROCEDURE — 43753 TX GASTRO INTUB W/ASP: CPT

## 2019-12-19 PROCEDURE — 82435 ASSAY OF BLOOD CHLORIDE: CPT

## 2019-12-19 PROCEDURE — 94003 VENT MGMT INPAT SUBQ DAY: CPT

## 2019-12-19 PROCEDURE — 82565 ASSAY OF CREATININE: CPT

## 2019-12-19 PROCEDURE — 87040 BLOOD CULTURE FOR BACTERIA: CPT

## 2019-12-19 PROCEDURE — 80202 ASSAY OF VANCOMYCIN: CPT

## 2019-12-19 PROCEDURE — 96375 TX/PRO/DX INJ NEW DRUG ADDON: CPT | Mod: XU

## 2019-12-19 PROCEDURE — 80053 COMPREHEN METABOLIC PANEL: CPT

## 2019-12-19 PROCEDURE — 82330 ASSAY OF CALCIUM: CPT

## 2019-12-19 PROCEDURE — 87449 NOS EACH ORGANISM AG IA: CPT

## 2019-12-19 PROCEDURE — 87070 CULTURE OTHR SPECIMN AEROBIC: CPT

## 2019-12-19 PROCEDURE — 84484 ASSAY OF TROPONIN QUANT: CPT

## 2019-12-19 PROCEDURE — 71250 CT THORAX DX C-: CPT

## 2019-12-19 PROCEDURE — 99291 CRITICAL CARE FIRST HOUR: CPT | Mod: 25

## 2019-12-19 PROCEDURE — 82803 BLOOD GASES ANY COMBINATION: CPT

## 2019-12-19 PROCEDURE — 87186 SC STD MICRODIL/AGAR DIL: CPT

## 2019-12-19 PROCEDURE — 83735 ASSAY OF MAGNESIUM: CPT

## 2019-12-19 PROCEDURE — 93005 ELECTROCARDIOGRAM TRACING: CPT

## 2019-12-19 RX ADMIN — HYDROMORPHONE HYDROCHLORIDE 0.2 MILLIGRAM(S): 2 INJECTION INTRAMUSCULAR; INTRAVENOUS; SUBCUTANEOUS at 02:10

## 2019-12-19 RX ADMIN — Medication 1 DROP(S): at 00:05

## 2019-12-19 RX ADMIN — Medication 0.2 MILLIGRAM(S): at 02:10

## 2019-12-19 NOTE — DISCHARGE NOTE FOR THE EXPIRED PATIENT - HOSPITAL COURSE
89 yo F with a PMH significant for chronic UTI, wheel chair bound, dementia, afib, BiV pacemaker, HLD, gallstone pancreatitis p/w AMS. She has had nasal congestion, rhionrrhea, and cough with and without eating. Admitted for septic shock secondary to ?UTI. Was intubated and sedated. Palliative care consulted for [facility].    Decision made to transfer patient to the PCU with plans for compassionate extubation when the sons were ready. Patient's blood pressure was being supported with capped Levophed. Patient  in the PCU early in the morning on 19 on the ventilator. Family was made aware. 89 yo F with a PMH significant for chronic UTI, wheel chair bound, dementia, afib, BiV pacemaker, HLD, gallstone pancreatitis p/w AMS. She has had nasal congestion, rhionrrhea, and cough with and without eating. Admitted for septic shock secondary to ?UTI. Was intubated and sedated. Palliative care consulted for [facility].    Decision made to transfer patient to the PCU with plans for compassionate extubation when the sons were ready. Patient's blood pressure was being supported with capped Levophed. Patient  in the PCU early in the morning on 19 on the ventilator. Family was made aware.

## 2019-12-20 ENCOUNTER — APPOINTMENT (OUTPATIENT)
Dept: HOME HEALTH SERVICES | Facility: HOME HEALTH | Age: 84
End: 2019-12-20

## 2020-01-09 NOTE — ED PROCEDURE NOTE - ATTENDING CONTRIBUTION TO CARE
I Gallo Michel MD discussed the procedure with the resident and or ACP and went over risks and benefits as well as indications for the procedure. I was present for key portions of the procedure itself and assisted as needed.
I Gallo Michel MD discussed the procedure with the resident and or ACP and went over risks and benefits as well as indications for the procedure. I was present for key portions of the procedure itself and assisted as needed.

## 2020-02-19 ENCOUNTER — APPOINTMENT (OUTPATIENT)
Dept: GERIATRICS | Facility: CLINIC | Age: 85
End: 2020-02-19

## 2020-06-10 ENCOUNTER — APPOINTMENT (OUTPATIENT)
Dept: DERMATOLOGY | Facility: CLINIC | Age: 85
End: 2020-06-10

## 2020-12-16 PROBLEM — J06.9 VIRAL URI WITH COUGH: Status: RESOLVED | Noted: 2018-11-21 | Resolved: 2020-12-16

## 2021-02-03 NOTE — PATIENT PROFILE ADULT. - NS PRO CONTRA FLU 1
out of season (available sept 1 thru apr 2 only)
Detail Level: Detailed
Duration Of Freeze Thaw-Cycle (Seconds): 1
Render Note In Bullet Format When Appropriate: No
Post-Care Instructions: I reviewed with the patient in detail post-care instructions. Patient is to wear sunprotection, and avoid picking at any of the treated lesions. Pt may apply Vaseline to crusted or scabbing areas.
Consent: The patient's consent was obtained including but not limited to risks of crusting, scabbing, blistering, scarring, darker or lighter pigmentary change, recurrence, incomplete removal and infection.
Medical Necessity Information: It is in your best interest to select a reason for this procedure from the list below. All of these items fulfill various CMS LCD requirements except the new and changing color options.
Medical Necessity Clause: This procedure was medically necessary because the lesions that were treated were:

## 2021-03-22 NOTE — PATIENT PROFILE ADULT - NSPROMEDSADMININFO_GEN_A_NUR
unable to assess Metronidazole Pregnancy And Lactation Text: This medication is Pregnancy Category B and considered safe during pregnancy.  It is also excreted in breast milk.

## 2021-12-01 NOTE — REVIEW OF SYSTEMS
[As Noted in HPI] : as noted in HPI [Chest Pain] : no chest pain [Palpitations] : no palpitations [Shortness Of Breath] : no shortness of breath [Cough] : no cough Statement Selected

## 2022-01-12 NOTE — ED ADULT NURSE NOTE - ASSIGNED BED LOCATION
Pt c/o: Yellow discharge, scleral redness, film over eye. Single eye only. Ongoing congestion.    Onset: \"about 1 week ago\"    Per pt, \"I had a cold soon after Dorian. I wasn't tested for COVID because I didn't have plans to go out, and didn't go out. My sister had a cold too and she tested COVID negative. I blew my nose and thought I got some of it in my eye from missing the kleenex and maybe that's why it's like this. The film over my eye and the yellow oozing happened 4 days ago, and redness just before. The next day my eye was crusted shut. It still happened with the yellow crust and seepage, but better during the day. The yellow discharge is just at the corners of my eye now.\"    Pt also reports fever of 100 a couple days ago, nothing since. Pt does not wear contacts. Only ongoing cold sx: congestion.    Per pt, \"I don't feel like my eye is getting worse or better, so I'm asking for an antibiotic.\"    Pt agreeable to home care with warm compress over eye and gently cleaning eyelid and skin around eye with warm wet cotton ball/gauze.    Please advise if abx can be order for pt. Pharmacy: [facility].    Reason for Disposition  • Yellow or green pus in the eyes  • [1] Eye with yellow/green discharge or eyelashes stick together AND [2] NO PCP standing order to call in antibiotic eye drops    Protocols used: EYE - RED WITHOUT PUS-A-AH, EYE - PUS OR [id number]       [address]

## 2023-06-15 NOTE — PATIENT PROFILE ADULT - DO YOU FEEL THREATENED BY OTHERS?
Bladder non-tender and non-distended. Urine clear yellow. Detail Level: Detailed Quality 110: Preventive Care And Screening: Influenza Immunization: Influenza Immunization previously received during influenza season no

## 2024-02-23 NOTE — END OF VISIT
After obtaining consent and by orders of , injection of Proquad given SQ and Kinrix given IM in RVL by Leonor Shaffer MA. Patient tolerated well.  
[FreeTextEntry3] : 89yo woman who has been generally stable. Her son's main concern is persintent constipation, sometimes not moving bowels fro ~ 6-7 days. No abdominal cramping\par PE: Essentially wheelchair/bed bound, lungs clera Cannot communicate. Weight: 166lbs.\par Plan : Continue 24/7 Susy. recommend Miralax q od and monitor. 
Dry/Warm

## 2024-07-07 NOTE — ED PROVIDER NOTE - CHIEF COMPLAINT
Diaper changed and patient cleaned with soap and water due to urinary and bowel incontinence. Stool is yellow in color, hard, pebble like material. Skin protectant noted to red areas.   The patient is a 89y Female complaining of fever.

## 2024-12-26 NOTE — PROGRESS NOTE ADULT - PROBLEM/PLAN-7
DISPLAY PLAN FREE TEXT
[Follow-Up Visit] : a follow-up visit for
DISPLAY PLAN FREE TEXT

## 2025-03-06 NOTE — DISCHARGE NOTE ADULT - OTHER SIGNIFICANT FINDINGS
Provider notified. Pt given PRN Tylenol.
< from: US Abdomen Complete (07.25.18 @ 21:54) >  IMPRESSION:     Gallbladder distention and wall thickening with cholelithiasis and   sludge. Negative sonographic Jimenez sign. Findings are equivocal for   acute cholecystitis. Further evaluation with dynamic HIDA scan may be   obtained if clinical suspicion for cholecystitis persists.    < end of copied text >    < from: CT Abdomen and Pelvis No Cont (07.25.18 @ 19:06) >    IMPRESSION:     Acute compression fracture of L4 with at least 40% vertebral body height   loss and associated mild bony retropulsion.    Moderately distended gallbladder with small stones versus sludge. Acute   cholecystitis is not excluded. A HIDA scan can be obtained for complete   evaluation as clinically indicated.    Mild diffuse bladder wall thickening. Correlate clinically to exclude   infection.    A 2.9 cm left adnexal cyst, indeterminant. Pelvic ultrasound can be   obtained for further evaluation as clinically indicated.    Edema and air droplets noted of the visualized right upper extremity   adjacent to the elbow. Clinical correlation suggested.    These findings were discussed with Dr. Escamilla at 7/25/2018 7:22 PM   by Dr. Lai Chou with read back confirmation.    < end of copied text >    < from: US Echo Abdomen Limited (ED) (07.25.18 @ 20:42) >    IMPRESSION:There was focal dilation of the anterior gallbladder wall with   edema. Findings equivocal for acute cholecystitis versus chronic disease   process.     < end of copied text >